# Patient Record
Sex: MALE | Race: WHITE | NOT HISPANIC OR LATINO | Employment: FULL TIME | URBAN - METROPOLITAN AREA
[De-identification: names, ages, dates, MRNs, and addresses within clinical notes are randomized per-mention and may not be internally consistent; named-entity substitution may affect disease eponyms.]

---

## 2020-02-03 ENCOUNTER — APPOINTMENT (OUTPATIENT)
Dept: GENERAL RADIOLOGY | Facility: CLINIC | Age: 55
End: 2020-02-03
Attending: EMERGENCY MEDICINE
Payer: COMMERCIAL

## 2020-02-03 ENCOUNTER — HOSPITAL ENCOUNTER (EMERGENCY)
Facility: CLINIC | Age: 55
Discharge: HOME | End: 2020-02-03
Attending: EMERGENCY MEDICINE | Admitting: EMERGENCY MEDICINE
Payer: COMMERCIAL

## 2020-02-03 VITALS
BODY MASS INDEX: 24.39 KG/M2 | RESPIRATION RATE: 14 BRPM | SYSTOLIC BLOOD PRESSURE: 120 MMHG | HEART RATE: 67 BPM | OXYGEN SATURATION: 94 % | DIASTOLIC BLOOD PRESSURE: 75 MMHG | WEIGHT: 170 LBS | TEMPERATURE: 97 F

## 2020-02-03 DIAGNOSIS — R07.9 CHEST PAIN, UNSPECIFIED TYPE: ICD-10-CM

## 2020-02-03 LAB
ANION GAP SERPL CALCULATED.3IONS-SCNC: 4 MMOL/L (ref 3–14)
BASOPHILS # BLD AUTO: 0 10E9/L (ref 0–0.2)
BASOPHILS NFR BLD AUTO: 0.5 %
BUN SERPL-MCNC: 13 MG/DL (ref 7–30)
CALCIUM SERPL-MCNC: 9.3 MG/DL (ref 8.5–10.1)
CHLORIDE SERPL-SCNC: 104 MMOL/L (ref 94–109)
CO2 SERPL-SCNC: 30 MMOL/L (ref 20–32)
CREAT SERPL-MCNC: 0.83 MG/DL (ref 0.66–1.25)
DIFFERENTIAL METHOD BLD: NORMAL
EOSINOPHIL # BLD AUTO: 0.1 10E9/L (ref 0–0.7)
EOSINOPHIL NFR BLD AUTO: 1.4 %
ERYTHROCYTE [DISTWIDTH] IN BLOOD BY AUTOMATED COUNT: 12.9 % (ref 10–15)
GFR SERPL CREATININE-BSD FRML MDRD: >90 ML/MIN/{1.73_M2}
GLUCOSE SERPL-MCNC: 83 MG/DL (ref 70–99)
HCT VFR BLD AUTO: 43.9 % (ref 40–53)
HGB BLD-MCNC: 14.6 G/DL (ref 13.3–17.7)
IMM GRANULOCYTES # BLD: 0 10E9/L (ref 0–0.4)
IMM GRANULOCYTES NFR BLD: 0.2 %
LYMPHOCYTES # BLD AUTO: 2.5 10E9/L (ref 0.8–5.3)
LYMPHOCYTES NFR BLD AUTO: 30 %
MCH RBC QN AUTO: 30.6 PG (ref 26.5–33)
MCHC RBC AUTO-ENTMCNC: 33.3 G/DL (ref 31.5–36.5)
MCV RBC AUTO: 92 FL (ref 78–100)
MONOCYTES # BLD AUTO: 0.5 10E9/L (ref 0–1.3)
MONOCYTES NFR BLD AUTO: 6.1 %
NEUTROPHILS # BLD AUTO: 5.2 10E9/L (ref 1.6–8.3)
NEUTROPHILS NFR BLD AUTO: 61.8 %
NRBC # BLD AUTO: 0 10*3/UL
NRBC BLD AUTO-RTO: 0 /100
PLATELET # BLD AUTO: 245 10E9/L (ref 150–450)
POTASSIUM SERPL-SCNC: 3.7 MMOL/L (ref 3.4–5.3)
RBC # BLD AUTO: 4.77 10E12/L (ref 4.4–5.9)
SODIUM SERPL-SCNC: 138 MMOL/L (ref 133–144)
TROPONIN I SERPL-MCNC: 0.01 UG/L (ref 0–0.04)
TROPONIN I SERPL-MCNC: <0.015 UG/L (ref 0–0.04)
WBC # BLD AUTO: 8.5 10E9/L (ref 4–11)

## 2020-02-03 PROCEDURE — 85025 COMPLETE CBC W/AUTO DIFF WBC: CPT | Performed by: EMERGENCY MEDICINE

## 2020-02-03 PROCEDURE — 25000132 ZZH RX MED GY IP 250 OP 250 PS 637: Performed by: EMERGENCY MEDICINE

## 2020-02-03 PROCEDURE — 99285 EMERGENCY DEPT VISIT HI MDM: CPT | Mod: 25

## 2020-02-03 PROCEDURE — 80048 BASIC METABOLIC PNL TOTAL CA: CPT | Performed by: EMERGENCY MEDICINE

## 2020-02-03 PROCEDURE — 71046 X-RAY EXAM CHEST 2 VIEWS: CPT

## 2020-02-03 PROCEDURE — 84484 ASSAY OF TROPONIN QUANT: CPT | Performed by: EMERGENCY MEDICINE

## 2020-02-03 PROCEDURE — 93005 ELECTROCARDIOGRAM TRACING: CPT

## 2020-02-03 RX ORDER — ACETAMINOPHEN 325 MG/1
975 TABLET ORAL ONCE
Status: COMPLETED | OUTPATIENT
Start: 2020-02-03 | End: 2020-02-03

## 2020-02-03 RX ADMIN — ACETAMINOPHEN 975 MG: 325 TABLET, FILM COATED ORAL at 21:57

## 2020-02-03 NOTE — ED AVS SNAPSHOT
[facility] Emergency Department  [address]  [facility] [address]  Phone:  [phone]  Fax:  [phone]                                    Pawel Vallejo   MRN: [id number]    Department:  [facility] Emergency Department   Date of Visit:  2/3/2020           After Visit Summary Signature Page    I have received my discharge instructions, and my questions have been answered. I have discussed any challenges I see with this plan with the nurse or doctor.    ..........................................................................................................................................  Patient/Patient Representative Signature      ..........................................................................................................................................  Patient Representative Print Name and Relationship to Patient    ..................................................               ................................................  Date                                   Time    ..........................................................................................................................................  Reviewed by Signature/Title    ...................................................              ..............................................  Date                                               Time          22EPIC Rev 08/18

## 2020-02-04 LAB — INTERPRETATION ECG - MUSE: NORMAL

## 2020-02-04 NOTE — ED TRIAGE NOTES
"Pt reports a week of constant chest pain. Pain is only relieved by Aspirin, states it comes back in a couple hours. Pt took 2 baby Aspirin today. Pt states it feels like a weight on his chest. Pt feels like he can take a full breath but gets short of breath with exertion. Pt denies arm pain, has some left jaw pain. Pt states \"I feel swimmy\", feels \"slow\". Pt has been able to work and drive all week.   "

## 2020-02-04 NOTE — ED PROVIDER NOTES
History     Chief Complaint:  Chest Pain      HPI   Pawel Vallejo is a 54 year old male who presents with chest pain. Patient states he flew back from [place] a week ago and has been feeling some chest heaviness since then. He notes the chest pain has been constant the whole week and today has been the most notable for his pain. He states the pain is there whether he is sitting or standing.  No positional changes.  He does note that he was carrying a lot of luggage at the airport and this may have been the onset of pain. He also notes shortness of breath when he moves, and he feels winded when he talks. Additionally, he notes a dull ache in his lower mandible. He notes he took 2 regular aspirin last night and 2 regular aspirin in the morning, and the pain went away after both instances. He also states he has been able to do his normal activities this past week. He notes he was a smoker for about 30 years, but he quit 9 years ago. After the death of his mother from a stroke a year and a half ago, he has began smoking sporadically. He also notes social alcohol. He denies experiencing arm pain, leg swelling, or fever. He denies history of hypertension, heart disease, or diabetes. He denies family history of heart disease. He denies street drug use.     Allergies:  The patient has no known drug allergies.    Medications:    Naproxen  Norco  Prevacid  Aspirin  Flomax    Past Medical History:    Chronic back pain  Pleurisy  Anxiety  GERD  Complete tear of left rotator cuff  IBS  Hyperlipidemia    Past Surgical History:    Cholecystectomy  Hernia repair  Forearm/wrist surgery, L/R    Family History:    Father: Cancer, Diabetes  Mother: Crohn's disease, stroke    Social History:  Rare tobacco use  Occasional alcohol use  Denies drug use  Marital Status:   [2]       Review of Systems   All other systems reviewed and are negative.      Physical Exam   First Vitals:  BP: (!) 141/103  Pulse: 65  Heart Rate:  60  Temp: 97  F (36.1  C)  Resp: 16  Weight: 77.1 kg (170 lb)  SpO2: 98 %      Physical Exam  General: Resting on the bed.  Head: No obvious trauma to head.  Ears, Nose, Throat:  External ears normal.  Nose normal.    Eyes:  Conjunctivae clear.  Pupils are equal, round, and reactive.   Neck: Normal range of motion.  Neck supple.   CV: Regular rate and rhythm.  No murmurs.    2+ radial pulses.  Mild chest wall tenderness to palpation   Respiratory: Effort normal and breath sounds normal.  No wheezing or crackles.   Gastrointestinal: Soft.  No distension. There is no tenderness.  There is no rigidity, no rebound and no guarding.   Musculoskeletal: Non tender non edematous calves  Neuro: Alert. Moving all extremities appropriately.  Normal speech.    Skin: Skin is warm and dry.  No rash noted.     Emergency Department Course   ECG:  Indication: Chest Pain  Time: 1932  Vent. Rate 57 bpm. MN interval 196. QRS duration 108. QT/QTc 430/418. P-R-T axis 72 55 63.  Sinus bradycardia Otherwise normal ECG. No significant change compared to EKG dated 9/6/4. Read time: 2148    Imaging:  XR Chest 2 views:   IMPRESSION: Normal heart size . No evidence for CHF or pneumonia. Scattered punctate calcified granulomas in the lungs. No pleural effusion or pneumothorax. as per radiology.    Laboratory:    2047 Troponin: <0.015    2259 Troponin: 0.015    CBC: WBC: 8.5, HGB: 14.6, PLT: 245    BMP: Glucose 83,  o/w WNL (Creatinine: 0.83)    Interventions:  2157 Tylenol 975 mg Oral      Emergency Department Course:  Nursing notes and vitals reviewed. (2135) I performed an exam of the patient as documented above.     IV inserted. Medicine administered as documented above. Blood drawn. This was sent to the lab for further testing, results above.     The patient was sent for a XR Chest 2 Views while in the emergency department, findings above.     2317 I rechecked the patient and discussed the results of his workup thus far.     Findings and plan  explained to the Patient. Patient discharged home with instructions regarding supportive care, medications, and reasons to return. The importance of close follow-up was reviewed.     I personally reviewed the laboratory results with the Patient and answered all related questions prior to discharge.     Impression & Plan      Medical Decision Makin-year-old male presents with chest pain.  Vital signs are reassuring.  Broad differential was pursued including but not limited to PE, dissection, pneumonia, pneumothorax, effusion, acute coronary syndrome, arrhythmia, lecture, metabolic, renal dysfunction, pericarditis, chest wall pain, etc.  Patient is well-appearing nontoxic.  CBC shows no leukocytosis or anemia.  BMP shows no acute electrolyte, metabolic, renal dysfunction.  Consider dissection but no tearing pain, normal mediastinum, no evidence of dissection.  Considered CHF but no effusions, no evidence of effusions, etc.  Chest x-ray shows no acute effusion, pneumonia, pneumothorax.  EKG shows sinus rhythm, no evidence acute ST-T wave change.  No evidence of acute ischemic changes.  No evidence of arrhythmia.  Troponin x2 is negative.  Patient's is heart score 3.  He appears to be low risk.  This seems to be rather atypical pain as it resolved with aspirin and tylenol.  He reports he was carrying some pretty heavy items prior to the onset of the pain while in [place].  This may be chest wall pain.  I considered PE but patient has no tachycardia or hypoxia.  This does not appear to be consistent with PE.  He did have recent travel but it was a very short flight to [place] which is less than 2 hours.  He has no swelling in the legs or pain in the legs.  Overall suspicion for PE is very low.  Patient is otherwise healthy.  With reassuring work-up and vital signs I think he is reasonably safe for discharge.  Advise close follow-up with primary care doctor.  Strict return precautions were discussed and patient was  encouraged to return to the ER if with any worsening or changing symptoms.    Diagnosis:    ICD-10-CM    1. Chest pain, unspecified type R07.9        Disposition:  discharged to home    Discharge Medications:  Discharge Medication List as of 2/3/2020 11:25 PM        Scribe Disclosure:  VON, Hua Rivas, am serving as a scribe on 2/3/2020 at 9:35 PM to personally document services performed by Merle Farrell MD based on my observations and the provider's statements to me.     Hua Rivas  2/3/2020   [facility] EMERGENCY DEPARTMENT       Merle Farrell MD  02/04/20 0200

## 2020-10-12 NOTE — TELEPHONE ENCOUNTER
DIAGNOSIS:MRI imaging @ [facility] / Cervical Spine/ [facility] Appt Ok'd by Jenny   APPOINTMENT DATE: 10.16.20   NOTES STATUS DETAILS   OFFICE NOTE from referring provider N/A    OFFICE NOTE from other specialist Care Everywhere 9.21.20 Martín Martinez   DISCHARGE SUMMARY from hospital N/A    DISCHARGE REPORT from the ER N/A    OPERATIVE REPORT N/A    MEDICATION LIST Internal    EMG (for Spine) N/A    IMPLANT RECORD/STICKER N/A    LABS     CBC/DIFF Internal 2.3.20   CULTURES N/A    INJECTIONS DONE IN RADIOLOGY N/A    MRI PACS 10.8.20 cervical spine, [facility]   CT SCAN N/A    XRAYS (IMAGES & REPORTS) N/A    TUMOR     PATHOLOGY  Slides & report N/A      Action 10.12.20 MJ   Action Taken Requested image from [facility]     Action 10.15.20 MJ   Action Taken Called [facility] and [facility] to push image.     Action 10.15.20 MJ 4:28 PM   Action Taken Called [facility]- they will send image  UPDATE pulled image

## 2020-10-15 DIAGNOSIS — M54.2 CERVICAL PAIN: Primary | ICD-10-CM

## 2020-10-16 ENCOUNTER — PRE VISIT (OUTPATIENT)
Dept: ORTHOPEDICS | Facility: CLINIC | Age: 55
End: 2020-10-16

## 2020-10-16 ENCOUNTER — OFFICE VISIT (OUTPATIENT)
Dept: ORTHOPEDICS | Facility: CLINIC | Age: 55
End: 2020-10-16
Payer: COMMERCIAL

## 2020-10-16 ENCOUNTER — ANCILLARY PROCEDURE (OUTPATIENT)
Dept: GENERAL RADIOLOGY | Facility: CLINIC | Age: 55
End: 2020-10-16
Attending: ORTHOPAEDIC SURGERY
Payer: COMMERCIAL

## 2020-10-16 DIAGNOSIS — M54.50 LUMBAR PAIN: ICD-10-CM

## 2020-10-16 DIAGNOSIS — M54.2 CERVICAL PAIN: Primary | ICD-10-CM

## 2020-10-16 PROCEDURE — 72050 X-RAY EXAM NECK SPINE 4/5VWS: CPT | Performed by: RADIOLOGY

## 2020-10-16 PROCEDURE — 99204 OFFICE O/P NEW MOD 45 MIN: CPT | Performed by: ORTHOPAEDIC SURGERY

## 2020-10-16 RX ORDER — LOSARTAN POTASSIUM 25 MG/1
25 TABLET ORAL
COMMUNITY
Start: 2020-04-30 | End: 2020-12-18

## 2020-10-16 RX ORDER — GABAPENTIN 300 MG/1
300 CAPSULE ORAL 3 TIMES DAILY
Qty: 90 CAPSULE | Refills: 0 | Status: SHIPPED | OUTPATIENT
Start: 2020-10-16 | End: 2020-12-18

## 2020-10-16 RX ORDER — TAMSULOSIN HYDROCHLORIDE 0.4 MG/1
0.4 CAPSULE ORAL DAILY
Status: ON HOLD | COMMUNITY
Start: 2020-04-27 | End: 2020-12-31

## 2020-10-16 RX ORDER — IBUPROFEN 800 MG/1
800 TABLET, FILM COATED ORAL
COMMUNITY
Start: 2018-12-20 | End: 2020-12-18

## 2020-10-16 NOTE — NURSING NOTE
Reason For Visit:   Chief Complaint   Patient presents with     Consult     cervical pain left side radiating down left shoulder and arm effecting thumb and index finger. patient experiencing vertigo        Primary MD: Melvi Hale  Ref. MD: Geoffrey     ?  No    Date of injury: about 3 months ago   Type of injury: chronic .  Date of surgery: none   Type of surgery: none .  Smoker: No  Request smoking cessation information: No    There were no vitals taken for this visit.         Oswestry (CAROLINA) Questionnaire    No flowsheet data found.         Neck Disability Index (NDI) Questionnaire    No flowsheet data found.                Promis 10 Assessment    No flowsheet data found.             Carlos Yoo, ATC

## 2020-10-16 NOTE — PROGRESS NOTES
Spine Surgery Return Clinic Visit      Chief Complaint:   Consult (cervical pain left side radiating down left shoulder and arm effecting thumb and index finger. patient experiencing vertigo )      Interval HPI:  Symptom Profile Including: location of symptoms, onset, severity, exacerbating/alleviating factors, previous treatments:        Pawel Vallejo is a 55 year old male who presents today for evaluation of a left C5 and C6 distribution radiculopathy, which has been happening on and off for 9 months, but really quite severe over the last 3 weeks.  It consists of a burning and tingling pain shooting down into the shoulder and left thumb.  He has to tilt his head to the right side to get it to go away.  It is happening really all the time.  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  He tried a Medrol Dosepak and he is also been taking anti-inflammatories at home without much relief for it.            Past Medical History:     Past Medical History:   Diagnosis Date     Chronic back pain      Pleurisy             Past Surgical History:     Past Surgical History:   Procedure Laterality Date     CHOLECYSTECTOMY       HERNIA REPAIR              Social History:     Social History     Tobacco Use     Smoking status: Former Smoker     Smokeless tobacco: Never Used   Substance Use Topics     Alcohol use: Yes            Family History:   History reviewed. No pertinent family history.         Allergies:   No Known Allergies         Medications:     Current Outpatient Medications   Medication     ibuprofen (ADVIL/MOTRIN) 800 MG tablet     losartan (COZAAR) 25 MG tablet     tamsulosin (FLOMAX) 0.4 MG capsule     ASPIRIN PO     HYDROcodone-acetaminophen (NORCO) 5-325 MG per tablet     LANsoprazole (PREVACID) 30 MG capsule     No current facility-administered medications for this visit.              Review of Systems:   A focused  musculoskeletal and neurologic ROS was performed with pertinent positives and negatives noted in the HPI.  Additional systems were also reviewed and are documented at the bottom of the note.         Physical Exam:   Vitals: There were no vitals taken for this visit.  Musculoskeletal, Neurologic, and Spine:     Cervical spine:    Appearance -no gross step-offs, kyphosis.    Motor -     C5: Deltoids R 5/5 and L 5/5 strength    C6: Biceps R 5/5 and L 4/5 strength     C7: Triceps R 5/5 and L 5/5 strength     C8:  R 5/5 and L 4/5 strength     T1: Dorsal interossei R 4/5 and L 4/5 strength        Sensation: intact to light touch in C5-T1, diminished left C5 and C6      Special Tests -      Lhermitte's Test - Negative     Spurling's Test - positive to the left     Da Silva's Test - Negative          Neurologic:      REFLEXES Left Right   Biceps 1+ 1+   Triceps 1+ 1+   Brachioradialis 1+ 1+                             Alignment:  Patient stands with a neutral standing sagittal balance.           Imaging:   We ordered and independently reviewed new radiographs at this clinic visit. The results were discussed with the patient. Findings include:    Cervical radiographs today show mild diffuse spondylosis with trace kyphotic alignment.    Cervical MRI shows mild diffuse degenerative changes worse C4-5 and C5-6.  At C5-6 there is left severe foraminal stenosis.  At C4-5 there is mild to moderate central stenosis and bilateral foraminal stenosis.       Assessment and Plan:     55 year old male with left C5 and C6 distribution radiculopathy in the setting of foraminal stenosis and spondylosis on his MRI.    We discussed options today.  At this point he has not yet attempted appropriate nonoperative management and I recommended a trial of physical therapy, gabapentin and a left C6 selective nerve root injection.  I am hopeful this could give him some acute relief.  If this did not provide benefit for him, he might be a candidate  for either a cervical disc replacement or a cervical fusion.  Help us assess which would be the best surgery for him I recommended obtaining a cervical CT scan to better assess his spondylosis.  I will follow-up with him in 3 weeks to assess his response to the nonoperative management and decide at that time whether or not surgery would be indicated.    In terms of his low back, which he brought up at the end of the visit, he does have a history of injections in the past and some radicular complaints.  He is not had any imaging of this.  I recommended a lumbar MRI and I said I would also review this with him at the next visit.     Respectfully,  Pawel Irizarry MD  Spine Surgery  [facility]

## 2020-10-16 NOTE — LETTER
10/16/2020         RE: Pawel Vallejo  [address] [facility] [address]        Dear Colleague,    Thank you for referring your patient, Pawel Vallejo, to the [facility]. Please see a copy of my visit note below.    Spine Surgery Return Clinic Visit      Chief Complaint:   Consult (cervical pain left side radiating down left shoulder and arm effecting thumb and index finger. patient experiencing vertigo )      Interval HPI:  Symptom Profile Including: location of symptoms, onset, severity, exacerbating/alleviating factors, previous treatments:        Pawel Vallejo is a 55 year old male who presents today for evaluation of a left C5 and C6 distribution radiculopathy, which has been happening on and off for 9 months, but really quite severe over the last 3 weeks.  It consists of a burning and tingling pain shooting down into the shoulder and left thumb.  He has to tilt his head to the right side to get it to go away.  It is happening really all the time.  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  He tried a Medrol Dosepak and he is also been taking anti-inflammatories at home without much relief for it.            Past Medical History:     Past Medical History:   Diagnosis Date     Chronic back pain      Pleurisy             Past Surgical History:     Past Surgical History:   Procedure Laterality Date     CHOLECYSTECTOMY       HERNIA REPAIR              Social History:     Social History     Tobacco Use     Smoking status: Former Smoker     Smokeless tobacco: Never Used   Substance Use Topics     Alcohol use: Yes            Family History:   History reviewed. No pertinent family history.         Allergies:   No Known Allergies         Medications:     Current Outpatient Medications   Medication     ibuprofen (ADVIL/MOTRIN) 800 MG tablet     losartan (COZAAR) 25  MG tablet     tamsulosin (FLOMAX) 0.4 MG capsule     ASPIRIN PO     HYDROcodone-acetaminophen (NORCO) 5-325 MG per tablet     LANsoprazole (PREVACID) 30 MG capsule     No current facility-administered medications for this visit.              Review of Systems:   A focused musculoskeletal and neurologic ROS was performed with pertinent positives and negatives noted in the HPI.  Additional systems were also reviewed and are documented at the bottom of the note.         Physical Exam:   Vitals: There were no vitals taken for this visit.  Musculoskeletal, Neurologic, and Spine:     Cervical spine:    Appearance -no gross step-offs, kyphosis.    Motor -     C5: Deltoids R 5/5 and L 5/5 strength    C6: Biceps R 5/5 and L 4/5 strength     C7: Triceps R 5/5 and L 5/5 strength     C8:  R 5/5 and L 4/5 strength     T1: Dorsal interossei R 4/5 and L 4/5 strength        Sensation: intact to light touch in C5-T1, diminished left C5 and C6      Special Tests -      Lhermitte's Test - Negative     Spurling's Test - positive to the left     Da Silva's Test - Negative          Neurologic:      REFLEXES Left Right   Biceps 1+ 1+   Triceps 1+ 1+   Brachioradialis 1+ 1+                             Alignment:  Patient stands with a neutral standing sagittal balance.           Imaging:   We ordered and independently reviewed new radiographs at this clinic visit. The results were discussed with the patient. Findings include:    Cervical radiographs today show mild diffuse spondylosis with trace kyphotic alignment.    Cervical MRI shows mild diffuse degenerative changes worse C4-5 and C5-6.  At C5-6 there is left severe foraminal stenosis.  At C4-5 there is mild to moderate central stenosis and bilateral foraminal stenosis.       Assessment and Plan:     55 year old male with left C5 and C6 distribution radiculopathy in the setting of foraminal stenosis and spondylosis on his MRI.    We discussed options today.  At this point he has not  yet attempted appropriate nonoperative management and I recommended a trial of physical therapy, gabapentin and a left C6 selective nerve root injection.  I am hopeful this could give him some acute relief.  If this did not provide benefit for him, he might be a candidate for either a cervical disc replacement or a cervical fusion.  Help us assess which would be the best surgery for him I recommended obtaining a cervical CT scan to better assess his spondylosis.  I will follow-up with him in 3 weeks to assess his response to the nonoperative management and decide at that time whether or not surgery would be indicated.    In terms of his low back, which he brought up at the end of the visit, he does have a history of injections in the past and some radicular complaints.  He is not had any imaging of this.  I recommended a lumbar MRI and I said I would also review this with him at the next visit.     Respectfully,  Pawel Irizarry MD  Spine Surgery  [facility]

## 2020-10-22 ENCOUNTER — TELEPHONE (OUTPATIENT)
Dept: ORTHOPEDICS | Facility: CLINIC | Age: 55
End: 2020-10-22

## 2020-10-22 NOTE — TELEPHONE ENCOUNTER
RN called and spoke with patient. Explained to patient RN has not received a fax from [facility]. Also explained to patient it has been a struggle to get [facility] to do any imaging because they will not call patient to do imaging until a fax is filled with exactly the same info in the same place. Patient is frustrated but understood the process. Patient stated he is willing to wait for a few days to arrive. If it's more than a week, then he will get the imaging done with the [facility] system. RN will keep an eye for the fax and will be in touch with patient.    Star Lewis RN        [facility]    Phone Message    May a detailed message be left on voicemail: yes     Reason for Call: Other: Pt of Dr. Irizarry calling in stating that [facility] did receive the orders for MRI and CT.  Pt stated that [facility] faxed Dr. Irizarry's care team a form to fill out and fax back and they have not received it as of yet.  Pt cannot schedule his imaging until this gets done.  Pt is requesting a call about this today, he is really trying to get this scheduled      Action Taken: Message routed to:  [facility] Ortho    Travel Screening: Not Applicable

## 2020-10-27 ENCOUNTER — TELEPHONE (OUTPATIENT)
Dept: ORTHOPEDICS | Facility: CLINIC | Age: 55
End: 2020-10-27

## 2020-10-27 NOTE — TELEPHONE ENCOUNTER
RN called and spoke with patient. Told patient we have not received the fax from [facility] so patient agreed to getting the imaging with [facility]. RN gave number to call for imaging scheduling. Patient will notify RN once the imaging is obtained and RN will place him on Dr. Irizarry 's schedule virtual.    Star Lewis RN

## 2020-10-29 NOTE — TELEPHONE ENCOUNTER
[facility] Call Center    Phone Message    May a detailed message be left on voicemail: no     Reason for Call: Other: Pt is calling back about his imaging orders from Dr. Irizarry, Pt is asking that the orders be faxed to the [facility] in [place] at fax number  [phone].      Pt is requesting a call when this has been done so he can get these scans scheduled.    Action Taken: Message routed to:  [facility] Ortho    Travel Screening: Not Applicable

## 2020-10-30 ENCOUNTER — TRANSFERRED RECORDS (OUTPATIENT)
Dept: HEALTH INFORMATION MANAGEMENT | Facility: CLINIC | Age: 55
End: 2020-10-30

## 2020-11-03 ENCOUNTER — TELEPHONE (OUTPATIENT)
Dept: ORTHOPEDICS | Facility: CLINIC | Age: 55
End: 2020-11-03

## 2020-11-03 NOTE — TELEPHONE ENCOUNTER
RN called and left patient a detailed VM. If patient calls back, please offer patient a virtual visit with Dr. Irizarry to review CT and MR.  Thank you    Star Lewis RN        ----- Message from Pawel Irizarry MD sent at 11/3/2020  4:49 PM CST -----  Set up virtual visit please, thank you  ----- Message -----  From: Star Lweis RN  Sent: 11/3/2020   4:36 PM CST  To: MD Dr. Juan C Yeung.  I read you notes about this patient. Just want to let you know that his CT and MRI is in PACs.  Please reach out to patient after you have reviewed them.    Thank you    [email or web address]

## 2020-11-06 ENCOUNTER — VIRTUAL VISIT (OUTPATIENT)
Dept: ORTHOPEDICS | Facility: CLINIC | Age: 55
End: 2020-11-06
Payer: COMMERCIAL

## 2020-11-06 DIAGNOSIS — M54.2 CERVICAL PAIN: Primary | ICD-10-CM

## 2020-11-06 DIAGNOSIS — M54.12 CERVICAL RADICULOPATHY: ICD-10-CM

## 2020-11-06 PROCEDURE — 99214 OFFICE O/P EST MOD 30 MIN: CPT | Mod: GT | Performed by: ORTHOPAEDIC SURGERY

## 2020-11-06 NOTE — NURSING NOTE
Reason For Visit:   Chief Complaint   Patient presents with     RECHECK     follow up to discuss recent imaging        There were no vitals taken for this visit.         Carlos Yoo ATC

## 2020-11-06 NOTE — LETTER
"    11/6/2020         RE: Pawel Vallejo  [address] [id number]        Dear Colleague,    Thank you for referring your patient, Pawel Vallejo, to the [facility] [place]. Please see a copy of my visit note below.    Pawel Vallejo is a 55 year old male who is being evaluated via a billable video visit.      The patient has been notified of following:     \"This video visit will be conducted via a call between you and your physician/provider. We have found that certain health care needs can be provided without the need for an in-person physical exam.  This service lets us provide the care you need with a video conversation.  If a prescription is necessary we can send it directly to your pharmacy.  If lab work is needed we can place an order for that and you can then stop by our lab to have the test done at a later time.    Video visits are billed at different rates depending on your insurance coverage.  Please reach out to your insurance provider with any questions.    If during the course of the call the physician/provider feels a video visit is not appropriate, you will not be charged for this service.\"    Patient has given verbal consent for Video visit? Yes  How would you like to obtain your AVS? MyChart  If you are dropped from the video visit, the video invite should be resent to: Send to e-mail at: [email or web address]  Will anyone else be joining your video visit? No      I spoke with Gustavo via video today for 53 minutes.    He had a number of intelligent questions about options.  He recently completed a cervical CT scan which show spondylosis and foraminal stenosis C4-6.  This also fits with his cervical MRI showing C4-6 left greater than right foraminal stenosis and spondylosis.    He continues to have severe left-sided arm radicular complaints.  He cannot bend his head to the left side.  He during the call today he repeatedly bends his neck " to the right to try and relieve the symptoms.  He again notes that it is significantly limiting his work.  He has been on the gabapentin without much benefit.  He also had a left C6 selective nerve root block in October, which provided some short-term relief for a few hours but nothing long lasting.    We discussed options at this point and this is where the bulk of our discussion was spent.  With his mild spondylosis and slight kyphosis, I told him I would lean a little more towards a cervical fusion operation.  The alternative would be a disc replacement.  I explained the benefit of a disc replacement would be an easier perioperative recovery with no need for bone graft harvest and perhaps lesser risk of adjacent segment disease in the long-term.  The benefit of a fusion is that it may allow us to better address the spondylosis and slight kyphosis that is present on x-rays.  I explained that his spondylosis is somewhat mild to moderate and although it is not the most clear-cut indication there are certainly surgeons that are doing disc replacement in patients with this amount of arthritis.  So I told him is a little bit of a gray area and I asked his preferences and we talked through things and I told him in a gray area like this I would very much respect the patient's preference.  He had a number of intelligent questions about these options and ultimately together we discarded that a fusion based option would be the best approach.  Given his history of nicotine use we agreed to use iliac crest autograft.  I told him he would need to be nicotine free for surgery.    He will meet need to make sure he completes cervical PT to meet insurance requirements and he will also need a negative urine nicotine screen.  He will get these things done and then follow-up with me in early December.  I told him if these things are completed we could potentially shoot for late December or early January surgery.    I also explained  the need for cervical collar wear and the risks and benefits of the operation as noted below.  I told him he cannot drive for 6 weeks while wearing the collar.    Risks of this surgery include risk of infection, risk of dural tear resulting in CSF leak which might result in headaches, or possible need for lumbar drain, or possible revision surgery in the setting of a persistent leak. Possible nerve root injury resulting in numbness weakness or paralysis into the arms or legs. Possible radiculitis which could result in similar symptoms or could result in significant neurogenic type pain. Risk of incomplete decompression which might require revision surgery in the future.  Risk of adjacent segment problems requiring surgery in the future. Risk of incomplete relief of symptoms possibly requiring revision surgery in the future. Furthermore, although rare, there are risks of major vessel injury such as to the vertebral artery or major organ injury such as to the esophagus or trachea from the surgery.  There are risks of dysphagia or dysphonia which can make it hard to swallow or talk. I explained that in fact most patients will have some period of swallowing difficulty following the surgery.  In some cases these things occur as a result of laryngeal nerve injury, and we discussed this possibility as well. There is a risk of hematoma formation requiring urgent return to the OR for airway compromise.  There is a risk of blood clots in the legs or the lungs.  Lastly, although rare, there are certainly risks of the anesthetic including stroke heart attack and death.    The author of this note documented a reason for not sharing it with the patient.  Video-Visit Details    Type of service:  Video Visit    53 minutes video time    Originating Location (pt. Location): Home    Distant Location (provider location):  [facility]     Platform used for Video Visit: Catalina Irizarry  MD        Per Dr. Irizarry's instructions. RN placed a nicotinine/cotinine test. Also placed another PT order.  RN called patient and told patient the importance to get these done.  Also gave patient Keyanna's number in case he wants to know his surgery status.  Patient expressed understanding and will start working on quitting smoking.    Star Lewis RN    The author of this note documented a reason for not sharing it with the patient.          Pawel Irizarry MD

## 2020-11-06 NOTE — PROGRESS NOTES
Per Dr. Irizarry's instructions. RN placed a nicotinine/cotinine test. Also placed another PT order.  RN called patient and told patient the importance to get these done.  Also gave patient Keyanna's number in case he wants to know his surgery status.  Patient expressed understanding and will start working on quitting smoking.    Star Lewis RN    The author of this note documented a reason for not sharing it with the patient.

## 2020-11-06 NOTE — PROGRESS NOTES
"Pawel Vallejo is a 55 year old male who is being evaluated via a billable video visit.      The patient has been notified of following:     \"This video visit will be conducted via a call between you and your physician/provider. We have found that certain health care needs can be provided without the need for an in-person physical exam.  This service lets us provide the care you need with a video conversation.  If a prescription is necessary we can send it directly to your pharmacy.  If lab work is needed we can place an order for that and you can then stop by our lab to have the test done at a later time.    Video visits are billed at different rates depending on your insurance coverage.  Please reach out to your insurance provider with any questions.    If during the course of the call the physician/provider feels a video visit is not appropriate, you will not be charged for this service.\"    Patient has given verbal consent for Video visit? Yes  How would you like to obtain your AVS? MyChart  If you are dropped from the video visit, the video invite should be resent to: Send to e-mail at: [email or web address]  Will anyone else be joining your video visit? No      I spoke with Gustavo via video today for 53 minutes.    He had a number of intelligent questions about options.  He recently completed a cervical CT scan which show spondylosis and foraminal stenosis C4-6.  This also fits with his cervical MRI showing C4-6 left greater than right foraminal stenosis and spondylosis.    He continues to have severe left-sided arm radicular complaints.  He cannot bend his head to the left side.  He during the call today he repeatedly bends his neck to the right to try and relieve the symptoms.  He again notes that it is significantly limiting his work.  He has been on the gabapentin without much benefit.  He also had a left C6 selective nerve root block in October, which provided some short-term relief for a few hours " but nothing long lasting.    We discussed options at this point and this is where the bulk of our discussion was spent.  With his mild spondylosis and slight kyphosis, I told him I would lean a little more towards a cervical fusion operation.  The alternative would be a disc replacement.  I explained the benefit of a disc replacement would be an easier perioperative recovery with no need for bone graft harvest and perhaps lesser risk of adjacent segment disease in the long-term.  The benefit of a fusion is that it may allow us to better address the spondylosis and slight kyphosis that is present on x-rays.  I explained that his spondylosis is somewhat mild to moderate and although it is not the most clear-cut indication there are certainly surgeons that are doing disc replacement in patients with this amount of arthritis.  So I told him is a little bit of a gray area and I asked his preferences and we talked through things and I told him in a gray area like this I would very much respect the patient's preference.  He had a number of intelligent questions about these options and ultimately together we discarded that a fusion based option would be the best approach.  Given his history of nicotine use we agreed to use iliac crest autograft.  I told him he would need to be nicotine free for surgery.    He will meet need to make sure he completes cervical PT to meet insurance requirements and he will also need a negative urine nicotine screen.  He will get these things done and then follow-up with me in early December.  I told him if these things are completed we could potentially shoot for late December or early January surgery.    I also explained the need for cervical collar wear and the risks and benefits of the operation as noted below.  I told him he cannot drive for 6 weeks while wearing the collar.    Risks of this surgery include risk of infection, risk of dural tear resulting in CSF leak which might result in  headaches, or possible need for lumbar drain, or possible revision surgery in the setting of a persistent leak. Possible nerve root injury resulting in numbness weakness or paralysis into the arms or legs. Possible radiculitis which could result in similar symptoms or could result in significant neurogenic type pain. Risk of incomplete decompression which might require revision surgery in the future.  Risk of adjacent segment problems requiring surgery in the future. Risk of incomplete relief of symptoms possibly requiring revision surgery in the future. Furthermore, although rare, there are risks of major vessel injury such as to the vertebral artery or major organ injury such as to the esophagus or trachea from the surgery.  There are risks of dysphagia or dysphonia which can make it hard to swallow or talk. I explained that in fact most patients will have some period of swallowing difficulty following the surgery.  In some cases these things occur as a result of laryngeal nerve injury, and we discussed this possibility as well. There is a risk of hematoma formation requiring urgent return to the OR for airway compromise.  There is a risk of blood clots in the legs or the lungs.  Lastly, although rare, there are certainly risks of the anesthetic including stroke heart attack and death.    The author of this note documented a reason for not sharing it with the patient.  Video-Visit Details    Type of service:  Video Visit    53 minutes video time    Originating Location (pt. Location): Home    Distant Location (provider location):  [facility]     Platform used for Video Visit: Catalina Irizarry MD

## 2020-11-09 ENCOUNTER — THERAPY VISIT (OUTPATIENT)
Dept: PHYSICAL THERAPY | Facility: CLINIC | Age: 55
End: 2020-11-09
Attending: ORTHOPAEDIC SURGERY
Payer: COMMERCIAL

## 2020-11-09 DIAGNOSIS — M54.12 CERVICAL RADICULOPATHY: ICD-10-CM

## 2020-11-09 DIAGNOSIS — M54.2 CERVICAL PAIN: ICD-10-CM

## 2020-11-09 PROCEDURE — 97140 MANUAL THERAPY 1/> REGIONS: CPT | Mod: GP | Performed by: PHYSICAL THERAPIST

## 2020-11-09 PROCEDURE — 97110 THERAPEUTIC EXERCISES: CPT | Mod: GP | Performed by: PHYSICAL THERAPIST

## 2020-11-09 PROCEDURE — 97161 PT EVAL LOW COMPLEX 20 MIN: CPT | Mod: GP | Performed by: PHYSICAL THERAPIST

## 2020-11-09 NOTE — PROGRESS NOTES
"[facility] Initial Evaluation  Subjective:  The history is provided by the patient. No  was used.   Therapist Generated HPI Evaluation  Problem details: Patient reports a gradual onset of neck pain and \"buzzing\" in left UE beginning about 4 months ago.  Patient c/o difficulty sleeping, looking up upwards, and turning head towards left.  Physical therapy was ordered 10/16/2020.  Patient is tentatively schedule to have cervical fusion surgery if therapy is not helpful.         Type of problem:  Cervical spine.    This is a new condition.    Where condition occurred: for unknown reasons.  Patient reports pain:  Lower cervical spine.  Pain is described as aching and sharp and is constant.  Pain radiates to:  Hand left. Pain is the same all the time.  Since onset symptoms are unchanged.  Associated symptoms:  Loss of motion/stiffness, numbness and tingling. Symptoms are exacerbated by driving, rotating head and looking up or down  Relieved by: tilting head toward right.  Special tests included:  CT scan and x-ray.  Past treatment: ADRIANO 1-2 weeks ago. Improved with treatment: helped for 3 hours.  Restrictions due to condition include:  Working in normal job without restrictions.  Barriers include:  None as reported by patient.    Patient Health History  Pawel Vallejo being seen for PT on Neck.     Date of Onset: 4 months ago.   Problem occurred: ?? old age   Pain is reported as 4/10 on pain scale.  General health as reported by patient is good.  Pertinent medical history includes: numbness/tingling.   Red flags:  None as reported by patient.  Medical allergies: none.   Surgeries include:  Orthopedic surgery.    Current medications:  High blood pressure medication.    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   Primary job tasks include:  Computer work and driving.                                    Objective:  Standing Alignment:    Cervical/Thoracic:  Forward " head                                    Cervical/Thoracic Evaluation    AROM:  AROM Cervical:    Flexion:            WNL.  Protraction WNL  Extension:       Min Loss (++) L UE Symptoms.  Retraction seated  Min/Mod Loss (++) L UE Symptoms.  Retraction in supine and slight flexion Min Loss (+/-).   Rotation:         Left: Min Loss     Right: WNL  Side Bend:      Left: Min Loss     Right:  Min Loss    Strength: Fair deep neck flexor strength    Cervical Myotomes:          C5 (Deltoid):  Left: 5    Right: 5  C6 (Biceps):  Left: 4+    Right: 5  C7 (Triceps):  Left: 5    Right: 5  C8 (Thumb Ext): Left: 5    Right: 5                Spinal Segmental Conclusions:  Decreased multi-segmental mobility lower cervical and upper thoracic                                                  General     ROS    Assessment/Plan:    Patient is a 55 year old male with cervical complaints.    Patient has the following significant findings with corresponding treatment plan.                Diagnosis 1:  Cervical Derangement  Pain -  manual therapy, self management, education and home program  Decreased ROM/flexibility - manual therapy, therapeutic exercise, therapeutic activity and home program  Decreased joint mobility - manual therapy, therapeutic exercise, therapeutic activity and home program  Decreased strength - therapeutic exercise, therapeutic activities and home program  Impaired muscle performance - neuro re-education and home program  Decreased function - therapeutic activities and home program  Impaired posture - neuro re-education, therapeutic activities and home program    Therapy Evaluation Codes:   1) History comprised of:   Personal factors that impact the plan of care:      None.    Comorbidity factors that impact the plan of care are:      None.     Medications impacting care: None.  2) Examination of Body Systems comprised of:   Body structures and functions that impact the plan of care:      Cervical spine.   Activity  limitations that impact the plan of care are:      Driving, Sleeping and Looking upward, Looking left.  3) Clinical presentation characteristics are:   Stable/Uncomplicated.  4) Decision-Making    Low complexity using standardized patient assessment instrument and/or measureable assessment of functional outcome.  Cumulative Therapy Evaluation is: Low complexity.    Previous and current functional limitations:  (See Goal Flow Sheet for this information)    Short term and Long term goals: (See Goal Flow Sheet for this information)     Communication ability:  Patient appears to be able to clearly communicate and understand verbal and written communication and follow directions correctly.  Treatment Explanation - The following has been discussed with the patient:   RX ordered/plan of care  Anticipated outcomes  Possible risks and side effects  This patient would benefit from PT intervention to resume normal activities.   Rehab potential is good.    Frequency:  1 X week, once daily  Duration:  for 8 weeks  Discharge Plan:  Achieve all LTG.  Independent in home treatment program.  Reach maximal therapeutic benefit.    Please refer to the daily flowsheet for treatment today, total treatment time and time spent performing 1:1 timed codes.

## 2020-11-12 ENCOUNTER — TELEPHONE (OUTPATIENT)
Dept: ORTHOPEDICS | Facility: CLINIC | Age: 55
End: 2020-11-12

## 2020-11-12 NOTE — TELEPHONE ENCOUNTER
Phoned patient to discuss scheduling surgery with Dr Irizarry. I left him my direct number to call back at his convenience. [phone]

## 2020-11-16 ENCOUNTER — THERAPY VISIT (OUTPATIENT)
Dept: PHYSICAL THERAPY | Facility: CLINIC | Age: 55
End: 2020-11-16
Payer: COMMERCIAL

## 2020-11-16 DIAGNOSIS — M54.12 CERVICAL RADICULOPATHY: ICD-10-CM

## 2020-11-16 PROCEDURE — 97530 THERAPEUTIC ACTIVITIES: CPT | Mod: GP | Performed by: PHYSICAL THERAPY ASSISTANT

## 2020-11-16 PROCEDURE — 97110 THERAPEUTIC EXERCISES: CPT | Mod: GP | Performed by: PHYSICAL THERAPY ASSISTANT

## 2020-11-20 NOTE — TELEPHONE ENCOUNTER
Patient is scheduled for surgery with Dr. Irizarry    Spoke or left message with: Patient    Date of Surgery: 12/31/20    Location: [place]    Post op: 6 weeks    Pre-op with surgeon (if applicable): Complete    H&P: Scheduled with PAC 12/11/20    Additional imaging/appointments: N/A    Surgery packet: Mailed to patient today    Additional comments: patient aware surgery may need to be rescheduled due to the pandemic

## 2020-11-23 ENCOUNTER — THERAPY VISIT (OUTPATIENT)
Dept: PHYSICAL THERAPY | Facility: CLINIC | Age: 55
End: 2020-11-23
Payer: COMMERCIAL

## 2020-11-23 DIAGNOSIS — M54.12 CERVICAL RADICULOPATHY: ICD-10-CM

## 2020-11-23 PROCEDURE — 97110 THERAPEUTIC EXERCISES: CPT | Mod: GT | Performed by: PHYSICAL THERAPY ASSISTANT

## 2020-11-23 NOTE — TELEPHONE ENCOUNTER
FUTURE VISIT INFORMATION      SURGERY INFORMATION:    Date: 20    Location: [facility]    Surgeon:  Pawel Irizarry MD    Anesthesia Type:  general    Procedure: Cervical 4 to 6 anterior cervical decompression and fusion with medtronic plate and screws, interbody device, use of fluoroscopy, microscope and left sided iliac crest autograft    Consult: virtual visit     RECORDS REQUESTED FROM:       Primary Care Provider: Melvi Hale MD - Martín    Most recent EKG+ Tracin/3/20    Most recent Cardiac Stress Test: 14

## 2020-11-27 ENCOUNTER — TRANSFERRED RECORDS (OUTPATIENT)
Dept: HEALTH INFORMATION MANAGEMENT | Facility: CLINIC | Age: 55
End: 2020-11-27

## 2020-11-29 DIAGNOSIS — Z11.59 ENCOUNTER FOR SCREENING FOR OTHER VIRAL DISEASES: Primary | ICD-10-CM

## 2020-11-30 ENCOUNTER — TRANSFERRED RECORDS (OUTPATIENT)
Dept: HEALTH INFORMATION MANAGEMENT | Facility: CLINIC | Age: 55
End: 2020-11-30

## 2020-12-02 ENCOUNTER — THERAPY VISIT (OUTPATIENT)
Dept: PHYSICAL THERAPY | Facility: CLINIC | Age: 55
End: 2020-12-02
Payer: COMMERCIAL

## 2020-12-02 DIAGNOSIS — M54.12 CERVICAL RADICULOPATHY: ICD-10-CM

## 2020-12-02 PROCEDURE — 97012 MECHANICAL TRACTION THERAPY: CPT | Mod: GP | Performed by: PHYSICAL THERAPIST

## 2020-12-02 PROCEDURE — 97530 THERAPEUTIC ACTIVITIES: CPT | Mod: GP | Performed by: PHYSICAL THERAPIST

## 2020-12-04 ENCOUNTER — VIRTUAL VISIT (OUTPATIENT)
Dept: ORTHOPEDICS | Facility: CLINIC | Age: 55
End: 2020-12-04
Payer: COMMERCIAL

## 2020-12-04 ENCOUNTER — TELEPHONE (OUTPATIENT)
Dept: ORTHOPEDICS | Facility: CLINIC | Age: 55
End: 2020-12-04

## 2020-12-04 DIAGNOSIS — M54.50 LUMBAR PAIN: Primary | ICD-10-CM

## 2020-12-04 PROCEDURE — 99213 OFFICE O/P EST LOW 20 MIN: CPT | Mod: TEL | Performed by: ORTHOPAEDIC SURGERY

## 2020-12-04 RX ORDER — TRIAMCINOLONE ACETONIDE 1 MG/G
CREAM TOPICAL
Status: ON HOLD | COMMUNITY
Start: 2020-11-30 | End: 2020-12-31

## 2020-12-04 NOTE — PROGRESS NOTES
"Pawel Vallejo is a 55 year old male who is being evaluated via a billable telephone visit.      The patient has been notified of following:     \"This telephone visit will be conducted via a call between you and your physician/provider. We have found that certain health care needs can be provided without the need for a physical exam.  This service lets us provide the care you need with a short phone conversation.  If a prescription is necessary we can send it directly to your pharmacy.  If lab work is needed we can place an order for that and you can then stop by our lab to have the test done at a later time.    Telephone visits are billed at different rates depending on your insurance coverage. During this emergency period, for some insurers they may be billed the same as an in-person visit.  Please reach out to your insurance provider with any questions.    If during the course of the call the physician/provider feels a telephone visit is not appropriate, you will not be charged for this service.\"    Patient has given verbal consent for Telephone visit?  Yes    What phone number would you like to be contacted at? Home    How would you like to obtain your AVS? [email or web address]    Phone call duration: 16 minutes    Pawel Irizarry MD    The patient has had low back issues for a number of years.  He is actually on the schedule with me for a two-level anterior cervical procedure due to radiculopathy.  At our last visit we had ordered a lumbar MRI to investigate the low back issues but they were not too bothersome.  This was in October.  About a week and a half ago he went into the emergency department with a severe onset of acute low back pain.  Occasionally goes down into the legs but is really in the left side of his low back.  It is fifteen out of ten in severity.  At the ER they gave him tizanidine, and it is calming down somewhat.  But is still quite severe.  New MRI was obtained at that visit.    November " 27, 2020 lumbar MRI shows mild L4-5 and L5-S1 spondylosis with some mild lateral recess stenosis but no severe nerve root impingement    I reported that there is no severe structural abnormality on this most recent MRI which is reassuring.  I think he has flared the spondylosis and stenosis.  I recommended an L4-5 interlaminar epidural steroid injection.  I am hopeful this will calm things down.  I would like to follow-up with him after this injection is complete.  If he is having complete and intractable pain that he just cannot live with then we would need to potentially delay his cervical procedure to deal with this new lumbar pain as it sounds like right now it is so severe he might not be able to undergo the operation, but I would like to try and calm things down with an injection before making that decision.

## 2020-12-04 NOTE — LETTER
"    12/4/2020         RE: Pawel Vallejo  [address] [facility] [address]        Dear Colleague,    Thank you for referring your patient, Pawel Vallejo, to the [facility] [place]. Please see a copy of my visit note below.    Pawel Vallejo is a 55 year old male who is being evaluated via a billable telephone visit.      The patient has been notified of following:     \"This telephone visit will be conducted via a call between you and your physician/provider. We have found that certain health care needs can be provided without the need for a physical exam.  This service lets us provide the care you need with a short phone conversation.  If a prescription is necessary we can send it directly to your pharmacy.  If lab work is needed we can place an order for that and you can then stop by our lab to have the test done at a later time.    Telephone visits are billed at different rates depending on your insurance coverage. During this emergency period, for some insurers they may be billed the same as an in-person visit.  Please reach out to your insurance provider with any questions.    If during the course of the call the physician/provider feels a telephone visit is not appropriate, you will not be charged for this service.\"    Patient has given verbal consent for Telephone visit?  Yes    What phone number would you like to be contacted at? Home    How would you like to obtain your AVS? [facility]    Phone call duration: 16 minutes    Pawel Irizarry MD    The patient has had low back issues for a number of years.  He is actually on the schedule with me for a two-level anterior cervical procedure due to radiculopathy.  At our last visit we had ordered a lumbar MRI to investigate the low back issues but they were not too bothersome.  This was in October.  About a week and a half ago he went into the emergency department with a severe onset of acute low back " pain.  Occasionally goes down into the legs but is really in the left side of his low back.  It is fifteen out of ten in severity.  At the ER they gave him tizanidine, and it is calming down somewhat.  But is still quite severe.  New MRI was obtained at that visit.    November 27, 2020 lumbar MRI shows mild L4-5 and L5-S1 spondylosis with some mild lateral recess stenosis but no severe nerve root impingement    I reported that there is no severe structural abnormality on this most recent MRI which is reassuring.  I think he has flared the spondylosis and stenosis.  I recommended an L4-5 interlaminar epidural steroid injection.  I am hopeful this will calm things down.  I would like to follow-up with him after this injection is complete.  If he is having complete and intractable pain that he just cannot live with then we would need to potentially delay his cervical procedure to deal with this new lumbar pain as it sounds like right now it is so severe he might not be able to undergo the operation, but I would like to try and calm things down with an injection before making that decision.      Pawel Irizarry MD

## 2020-12-04 NOTE — TELEPHONE ENCOUNTER
RN called patient. Per patient's request, he would like to have the injection done with [facility]. RN expressed understanding and told RN he will fax the order to [facility] and [facility] will call him with an appt.    Star Lewis RN

## 2020-12-07 ENCOUNTER — TRANSFERRED RECORDS (OUTPATIENT)
Dept: HEALTH INFORMATION MANAGEMENT | Facility: CLINIC | Age: 55
End: 2020-12-07

## 2020-12-08 ENCOUNTER — TELEPHONE (OUTPATIENT)
Dept: ORTHOPEDICS | Facility: CLINIC | Age: 55
End: 2020-12-08

## 2020-12-08 ENCOUNTER — PRE VISIT (OUTPATIENT)
Dept: SURGERY | Facility: CLINIC | Age: 55
End: 2020-12-08

## 2020-12-08 NOTE — TELEPHONE ENCOUNTER
Received call from patient requesting to know Dr Irizarry's recommendations regarding travel during the holiday season. Patient is scheduled for surgery on 12/31/20, and has potential plans to travel to [place] this Thursday - Sunday to visit family. Patient is aware that traveling increases his potential risk to COVID exposure and would like to know if Dr Irizarry advises against this travel. He will adhere to whatever Dr Irizarry recommends.

## 2020-12-11 ENCOUNTER — PRE VISIT (OUTPATIENT)
Dept: SURGERY | Facility: CLINIC | Age: 55
End: 2020-12-11

## 2020-12-14 ENCOUNTER — HEALTH MAINTENANCE LETTER (OUTPATIENT)
Age: 55
End: 2020-12-14

## 2020-12-18 RX ORDER — RIBOFLAVIN (VITAMIN B2) 100 MG
100 TABLET ORAL DAILY
Status: ON HOLD | COMMUNITY
End: 2020-12-31

## 2020-12-18 RX ORDER — MULTIVIT-MIN/IRON/FOLIC ACID/K 18-600-40
1000 CAPSULE ORAL DAILY
Status: ON HOLD | COMMUNITY
End: 2020-12-31

## 2020-12-18 RX ORDER — ZINC GLUCONATE 50 MG
50 TABLET ORAL DAILY
Status: ON HOLD | COMMUNITY
End: 2020-12-31

## 2020-12-18 RX ORDER — NAPROXEN 500 MG/1
250 TABLET ORAL 2 TIMES DAILY PRN
Status: ON HOLD | COMMUNITY
End: 2020-12-31

## 2020-12-18 NOTE — PROGRESS NOTES
Preoperative Assessment Center Medication History Note    Medication history completed via phone call on December 18, 2020 by this writer. See Epic admission navigator for prior to admission medications. Operating room staff will still need to confirm medications and last dose information on day of surgery.     Medication history interview sources:  Patient Interview    Changes made to PTA medication list (reason)  Added:   -- krill oil  -- vit a  -- vit D  -- vit C  -- zinc  -- naproxen    Deleted:   -- norco - no longer taking  -- gabapentin - no longer taking  -- aspirin - no longer taking for pain  -- ibuprofen - on naproxen  -- prevacid - no longer taking  -- losartan - no longer taking    Changed: None    Additional medication history information (including reliability of information, actions taken by pharmacist):    -- No recent (within 30 days) course of antibiotics  -- No recent (within 30 days) course of systemic steroids  -- Patient declines being on any other prescription or over-the-counter medications  -- patient taking both naproxen and tizanidine regularly, informed him that he will need to hold naproxen 4 days prior to the procedure, can you acetaminophen instead    Prior to Admission medications    Medication Sig Last Dose Taking? Auth Provider   KRILL OIL PO Take 1 capsule by mouth daily  Taking Yes Unknown, Entered By History   naproxen (NAPROSYN) 500 MG tablet Take 250 mg by mouth 2 times daily as needed  Taking Yes Unknown, Entered By History   tamsulosin (FLOMAX) 0.4 MG capsule Take 0.4 mg by mouth daily  Taking Yes Reported, Patient   tiZANidine (ZANAFLEX) 4 MG tablet Take 4 mg by mouth 3 times daily as needed for muscle spasms Taking Yes Unknown, Entered By History   VITAMIN A PO Take 1 tablet by mouth daily Taking Yes Unknown, Entered By History   vitamin C (ASCORBIC ACID) 100 MG tablet Take 100 mg by mouth daily  Yes Unknown, Entered By History   Vitamin D, Cholecalciferol, 25 MCG (1000  UT) TABS Take 1,000 Units by mouth daily  Yes Unknown, Entered By History   zinc gluconate 50 MG tablet Take 50 mg by mouth daily Taking Yes Unknown, Entered By History   triamcinolone (KENALOG) 0.1 % external cream    Reported, Patient         Medication history completed by: Juan Jay RP

## 2020-12-21 ENCOUNTER — OFFICE VISIT (OUTPATIENT)
Dept: SURGERY | Facility: CLINIC | Age: 55
End: 2020-12-21
Payer: COMMERCIAL

## 2020-12-21 ENCOUNTER — PRE VISIT (OUTPATIENT)
Dept: SURGERY | Facility: CLINIC | Age: 55
End: 2020-12-21

## 2020-12-21 ENCOUNTER — ANESTHESIA EVENT (OUTPATIENT)
Dept: SURGERY | Facility: CLINIC | Age: 55
End: 2020-12-21
Payer: COMMERCIAL

## 2020-12-21 VITALS
WEIGHT: 175 LBS | BODY MASS INDEX: 25.05 KG/M2 | OXYGEN SATURATION: 97 % | SYSTOLIC BLOOD PRESSURE: 120 MMHG | RESPIRATION RATE: 15 BRPM | HEIGHT: 70 IN | DIASTOLIC BLOOD PRESSURE: 87 MMHG | HEART RATE: 65 BPM

## 2020-12-21 DIAGNOSIS — M54.12 CERVICAL RADICULOPATHY: ICD-10-CM

## 2020-12-21 DIAGNOSIS — Z01.818 PRE-OP EXAMINATION: Primary | ICD-10-CM

## 2020-12-21 DIAGNOSIS — Z01.818 PREOP EXAMINATION: Primary | ICD-10-CM

## 2020-12-21 DIAGNOSIS — M54.2 CERVICAL PAIN: ICD-10-CM

## 2020-12-21 LAB
ANION GAP SERPL CALCULATED.3IONS-SCNC: 6 MMOL/L (ref 3–14)
BUN SERPL-MCNC: 12 MG/DL (ref 7–30)
CALCIUM SERPL-MCNC: 9.1 MG/DL (ref 8.5–10.1)
CHLORIDE SERPL-SCNC: 106 MMOL/L (ref 94–109)
CO2 SERPL-SCNC: 29 MMOL/L (ref 20–32)
CREAT SERPL-MCNC: 0.85 MG/DL (ref 0.66–1.25)
ERYTHROCYTE [DISTWIDTH] IN BLOOD BY AUTOMATED COUNT: 12.6 % (ref 10–15)
GFR SERPL CREATININE-BSD FRML MDRD: >90 ML/MIN/{1.73_M2}
GLUCOSE SERPL-MCNC: 92 MG/DL (ref 70–99)
HCT VFR BLD AUTO: 45.1 % (ref 40–53)
HGB BLD-MCNC: 14.4 G/DL (ref 13.3–17.7)
MCH RBC QN AUTO: 29.6 PG (ref 26.5–33)
MCHC RBC AUTO-ENTMCNC: 31.9 G/DL (ref 31.5–36.5)
MCV RBC AUTO: 93 FL (ref 78–100)
PLATELET # BLD AUTO: 248 10E9/L (ref 150–450)
POTASSIUM SERPL-SCNC: 4.1 MMOL/L (ref 3.4–5.3)
RBC # BLD AUTO: 4.87 10E12/L (ref 4.4–5.9)
SODIUM SERPL-SCNC: 141 MMOL/L (ref 133–144)
WBC # BLD AUTO: 7.9 10E9/L (ref 4–11)

## 2020-12-21 PROCEDURE — 99000 SPECIMEN HANDLING OFFICE-LAB: CPT | Performed by: PATHOLOGY

## 2020-12-21 PROCEDURE — 36415 COLL VENOUS BLD VENIPUNCTURE: CPT | Performed by: PATHOLOGY

## 2020-12-21 PROCEDURE — 86850 RBC ANTIBODY SCREEN: CPT | Performed by: PATHOLOGY

## 2020-12-21 PROCEDURE — 86901 BLOOD TYPING SEROLOGIC RH(D): CPT | Performed by: PATHOLOGY

## 2020-12-21 PROCEDURE — 80048 BASIC METABOLIC PNL TOTAL CA: CPT | Performed by: PATHOLOGY

## 2020-12-21 PROCEDURE — 99203 OFFICE O/P NEW LOW 30 MIN: CPT | Performed by: PHYSICIAN ASSISTANT

## 2020-12-21 PROCEDURE — 86900 BLOOD TYPING SEROLOGIC ABO: CPT | Performed by: PATHOLOGY

## 2020-12-21 PROCEDURE — 85027 COMPLETE CBC AUTOMATED: CPT | Performed by: PATHOLOGY

## 2020-12-21 PROCEDURE — 80307 DRUG TEST PRSMV CHEM ANLYZR: CPT | Mod: 90 | Performed by: PATHOLOGY

## 2020-12-21 SDOH — HEALTH STABILITY: MENTAL HEALTH: HOW OFTEN DO YOU HAVE A DRINK CONTAINING ALCOHOL?: NOT ASKED

## 2020-12-21 SDOH — HEALTH STABILITY: MENTAL HEALTH: HOW MANY STANDARD DRINKS CONTAINING ALCOHOL DO YOU HAVE ON A TYPICAL DAY?: NOT ASKED

## 2020-12-21 SDOH — HEALTH STABILITY: MENTAL HEALTH: HOW OFTEN DO YOU HAVE 6 OR MORE DRINKS ON ONE OCCASION?: NOT ASKED

## 2020-12-21 ASSESSMENT — LIFESTYLE VARIABLES: TOBACCO_USE: 1

## 2020-12-21 ASSESSMENT — MIFFLIN-ST. JEOR: SCORE: 1625.04

## 2020-12-21 ASSESSMENT — ENCOUNTER SYMPTOMS: SEIZURES: 0

## 2020-12-21 ASSESSMENT — PAIN SCALES - GENERAL: PAINLEVEL: NO PAIN (0)

## 2020-12-21 NOTE — H&P
Pre-Operative H & P     CC:  Preoperative exam to assess for increased cardiopulmonary risk while undergoing surgery and anesthesia.    Date of Encounter: 12/21/2020  Primary Care Physician:  Melvi Hale     Reason for visit: pre operative examination, Cervical radiculopathy     HPI  Pawel Vallejo is a 55 year old male who presents for pre-operative H & P in preparation for Cervical 4 to 6 anterior cervical decompression and fusion with medtronic plate and screws, interbody device, use of fluoroscopy, microscope, and left sided iliac crest autograft  with Dr. Irizarry on 12/31/20 at [facility].     The patient is a 55 year old man who has a past medical history significant for HLD, history of HTN, BPH, GERD, former smoker, chronic back pain and cervical radiculopathy. The patient first met with Dr. Irizarry on 10/16/20. He complained of neck pain which radiated down his left arm causing tingling and weakness. He was limited in his work due to the discomfort and had tried a Medrol Dosepak and OTC pain medications without significant relief. They reviewed his MRI and discussed his diagnosis. He was started conservative treatment with PT, nerve root injection and gabapentin. He followed up again virtually on 11/6/20 and they discussed surgical treatment options. The patient was seen then on 12/4/20 for a flare of his chronic lower back pain. They discussed conservative treatment and to address his cervical issues first. The patient is now scheduled for the procedure as above.     History is obtained from the patient and chart review    Past Medical History  Past Medical History:   Diagnosis Date     BPH (benign prostatic hyperplasia)      Chronic back pain      Gastroesophageal reflux disease with esophagitis      History of hypertension      HLD (hyperlipidemia)      Pleurisy        Past Surgical History  Past Surgical History:   Procedure Laterality Date      CHOLECYSTECTOMY       HERNIA REPAIR      Diastasis recti/ abdominal       Hx of Blood transfusions/reactions: yes at 14 years old without issues     Hx of abnormal bleeding or anti-platelet use: none    Menstrual history: No LMP for male patient.:     Steroid use in the last year: none    Personal or FH with difficulty with Anesthesia:  denies    Prior to Admission Medications  Current Outpatient Medications   Medication Sig Dispense Refill     KRILL OIL PO Take 1 capsule by mouth daily        naproxen (NAPROSYN) 500 MG tablet Take 250 mg by mouth 2 times daily as needed        tamsulosin (FLOMAX) 0.4 MG capsule Take 0.4 mg by mouth daily        tiZANidine (ZANAFLEX) 4 MG tablet Take 4 mg by mouth 3 times daily as needed for muscle spasms       triamcinolone (KENALOG) 0.1 % external cream        VITAMIN A PO Take 1 tablet by mouth daily       vitamin C (ASCORBIC ACID) 100 MG tablet Take 100 mg by mouth daily       Vitamin D, Cholecalciferol, 25 MCG (1000 UT) TABS Take 1,000 Units by mouth daily       zinc gluconate 50 MG tablet Take 50 mg by mouth daily         Allergies  No Known Allergies    Social History  Social History     Socioeconomic History     Marital status:      Spouse name: Not on file     Number of children: Not on file     Years of education: Not on file     Highest education level: Not on file   Occupational History     Not on file   Social Needs     Financial resource strain: Not on file     Food insecurity     Worry: Not on file     Inability: Not on file     Transportation needs     Medical: Not on file     Non-medical: Not on file   Tobacco Use     Smoking status: Former Smoker     Packs/day: 0.25     Years: 30.00     Pack years: 7.50     Quit date: 10/2020     Years since quittin.2     Smokeless tobacco: Never Used     Tobacco comment: quit in . restarted in the past couple of years and then smoked more socially.   Substance and Sexual Activity     Alcohol use: Yes     Comment:  socially     Drug use: No     Sexual activity: Yes     Partners: Female   Lifestyle     Physical activity     Days per week: Not on file     Minutes per session: Not on file     Stress: Not on file   Relationships     Social connections     Talks on phone: Not on file     Gets together: Not on file     Attends Christianity service: Not on file     Active member of club or organization: Not on file     Attends meetings of clubs or organizations: Not on file     Relationship status: Not on file     Intimate partner violence     Fear of current or ex partner: Not on file     Emotionally abused: Not on file     Physically abused: Not on file     Forced sexual activity: Not on file   Other Topics Concern     Parent/sibling w/ CABG, MI or angioplasty before 65F 55M? Not Asked   Social History Narrative     Not on file       Family History  History reviewed. No pertinent family history.    Review of Systems    ROS/MED HX    ENT/Pulmonary:     (+)tobacco use, Past use , . .    Neurologic:  - neg neurologic ROS    (-) seizures, CVA and TIA   Cardiovascular:     (+) Dyslipidemia, hypertension----. : . . . :. . Previous cardiac testing date:results:Stress Testdate:2014 results:Interpretation Summary     Normal exercise duration Chest pain with exercise Normal HR and BP response     to exercise Normal Stress ECG Normal systolic wall motion and thickening at     rest Normal systolic augmentation post exercise Normal Stress Echocardiogram     PatientHeight: 70 in     PatientWeight: 180 lbs     SystolicPressure: 132 mmHg     DiastolicPressure: 76 mmHg     HeartRate: 64 bpm     BSA 2.0 m^2 ECG reviewed date:2/3/20 results:SB date: results:          METS/Exercise Tolerance:  4 - Raking leaves, gardening   Hematologic:     (+) History of Transfusion no previous transfusion reaction -     (-) history of blood clots and anemia   Musculoskeletal:   (+)  other musculoskeletal- low back pain, cervical radiculopathy       GI/Hepatic:     (+)  "GERD (PRN symptoms) Other,       Renal/Genitourinary:     (+) BPH,       Endo:  - neg endo ROS       Psychiatric:  - neg psychiatric ROS       Infectious Disease:  - neg infectious disease ROS       Malignancy:      - no malignancy   Other:    (+) H/O Chronic Pain,no other significant disability        The complete review of systems is negative other than noted in the HPI or here.       BP: 120/87 Pulse: 65   Resp: 15 SpO2: 97 %         175 lbs 0 oz  5' 10\"   Body mass index is 25.11 kg/m .       Physical Exam  Constitutional: Awake, alert, cooperative, no apparent distress, and appears stated age.  Eyes: Pupils equal, round and reactive to light, extra ocular muscles intact, sclera clear, conjunctiva normal.  HENT: Normocephalic, oral pharynx with moist mucus membranes, good dentition. No goiter appreciated.   Respiratory: Clear to auscultation bilaterally, no crackles or wheezing.  Cardiovascular: Regular rate and rhythm, normal S1 and S2, and no murmur noted.  Carotids +2, no bruits. No edema. Palpable pulses to radial  DP and PT arteries.   GI: Normal bowel sounds, soft, non-distended, non-tender, no masses palpated, no hepatosplenomegaly.  Surgical scars: well healed  Lymph/Hematologic: No cervical lymphadenopathy and no supraclavicular lymphadenopathy.  Genitourinary:  defer  Skin: Warm and dry.  No rashes at anticipated surgical site.   Musculoskeletal: Full ROM of neck. There is no redness, warmth, or swelling of the joints. Gross motor strength is normal.    Neurologic: Awake, alert, oriented to name, place and time. Cranial nerves II-XII are grossly intact. Gait is normal.   Neuropsychiatric: Calm, cooperative. Normal affect.     Labs: (personally reviewed)  Results for CHRISTINA GUTIERREZ (MRN [id number]) as of 12/21/2020 11:52   Ref. Range 12/21/2020 11:22   Sodium Latest Ref Range: 133 - 144 mmol/L 141   Potassium Latest Ref Range: 3.4 - 5.3 mmol/L 4.1   Chloride Latest Ref Range: 94 - 109 mmol/L 106 "   Carbon Dioxide Latest Ref Range: 20 - 32 mmol/L 29   Urea Nitrogen Latest Ref Range: 7 - 30 mg/dL 12   Creatinine Latest Ref Range: 0.66 - 1.25 mg/dL 0.85   GFR Estimate Latest Ref Range: >60 mL/min/1.73_m2 >90   GFR Estimate If Black Latest Ref Range: >60 mL/min/1.73_m2 >90   Calcium Latest Ref Range: 8.5 - 10.1 mg/dL 9.1   Anion Gap Latest Ref Range: 3 - 14 mmol/L 6   Glucose Latest Ref Range: 70 - 99 mg/dL 92   WBC Latest Ref Range: 4.0 - 11.0 10e9/L 7.9   Hemoglobin Latest Ref Range: 13.3 - 17.7 g/dL 14.4   Hematocrit Latest Ref Range: 40.0 - 53.0 % 45.1   Platelet Count Latest Ref Range: 150 - 450 10e9/L 248   RBC Count Latest Ref Range: 4.4 - 5.9 10e12/L 4.87   MCV Latest Ref Range: 78 - 100 fl 93   MCH Latest Ref Range: 26.5 - 33.0 pg 29.6   MCHC Latest Ref Range: 31.5 - 36.5 g/dL 31.9   RDW Latest Ref Range: 10.0 - 15.0 % 12.6       EK/3/20     Sinus bradycardia          Stress test      Interpretation Summary     Normal exercise duration Chest pain with exercise Normal HR and BP response      to exercise Normal Stress ECG Normal systolic wall motion and thickening at      rest Normal systolic augmentation post exercise Normal Stress Echocardiogram     PatientHeight: 70 in     PatientWeight: 180 lbs     SystolicPressure: 132 mmHg     DiastolicPressure: 76 mmHg     HeartRate: 64 bpm     BSA 2.0 m^2     The patient's records and results personally reviewed by this provider.     Outside records reviewed from: care everywhere    ASSESSMENT and PLAN  Gustavo is a 55 year old man who is scheduled for Cervical 4 to 6 anterior cervical decompression and fusion with medtronic plate and screws, interbody device, use of fluoroscopy, microscope, and left sided iliac crest autograft  on 20 by Dr. Irizarry in treatment of Cervical radiculopathy .  PAC referral for risk assessment and optimization for anesthesia with comorbid conditions of HLD, history of HTN, former smoker, GERD and BPH:    Pre-operative  considerations:  1.  Cardiac:  Functional status- METS 4, the patient is able to walk several flights of stairs without issues.  Intermediate risk surgery with 0.4% (RCRI #) risk of major adverse cardiac event. The patient had a recent EKG on 2/3/20 and denies any new symptoms.   ~ HLD - not currently on medications.   ~ History of HTN- was taking cozaar but stopped. Normal blood pressure today 120/87.     2.  Pulm:  Airway feasible.  DEEPIKA risk: Low (male, age)  ~ Former smoker - smoked 0.25 ppd for 30 years and then quit in 2012 but then restarted socially 2 years ago. Currently no smoking for over a month. PRN nicotine patches.     3. Heme: history of transfusion when he was 14 years old. Will check T/S today.     4. GI:  Risk of PONV score = 2.  If > 2, anti-emetic intervention recommended.  ~ GERD - occasional symptoms. PRn prevacid.     5. : BPH - continue flomax.     6. Musculoskeletal: Cervical radiculopathy/ low back pain - continue PRN flexeril. Procedure as above and consideration for careful positioning to minimize discomfort.     VTE risk: 0.5%    The patient is optimized for their procedure. AVS with information on surgery time/arrival time, meds and NPO status given by nursing staff.        Jeana Patel PA-C  Preoperative Assessment Center  [facility]  Clinic and Surgery Center  Phone: [phone]  Fax: [phone]

## 2020-12-21 NOTE — PATIENT INSTRUCTIONS
Preparing for Your Surgery      Name:  Pawel Vallejo   MRN:  [id number]   :  1965   Today's Date:  2020       Arriving for surgery:  Surgery date:  20  Arrival time:  5:30 am    Restrictions due to COVID 19:  No visitors are allowed at this time.    [facility] parking is available for anyone with mobility limitations or disabilities.  ([place]  24 hours/ 7 days a week; [facility]  7 am- 3:30 pm, Mon- Fri)    Please come to:  [facility] [address]  [address] [place]  [address]  -Proceed to the 3rd floor, check in at the Adult Surgery Waiting Lounge. [phone]    If an escort is needed stop at the Information Desk in the lobby. Inform the information person that you are here for surgery. An escort to the Adult Surgery Waiting Lounge will be provided.    What can I eat or drink?  -  You may eat and drink normally for up to 8 hours before your surgery. (Until 11:30 pm 20)  -  You may have clear liquids until 2 hours before surgery. (Until 5:30 am)    Examples of clear liquids:  Water  Clear broth  Juices (apple, white grape, white cranberry  and cider) without pulp  Noncarbonated, powder based beverages  (lemonade and Leif-Aid)  Sodas (Sprite, 7-Up, ginger ale and seltzer)  Coffee or tea (without milk or cream)  Gatorade    -  No Alcohol for at least 24 hours before surgery     Which medicines can I take?  Hold Aspirin for 7 days before surgery.   Hold Multivitamins for 7 days before surgery.  Hold Supplements (Krill Oil) for 7 days before surgery. Take last Krill Oil prior to surgery on 20 and hold until surgery.  Hold Ibuprofen (Advil, Motrin) for 1 day before surgery--unless otherwise directed by surgeon.  Hold Naproxen (Aleve/Naprosyn) for 4 days before surgery. May take last dose of Naproxen if needed on 20 and hold until surgery.    -  DO NOT take these medications the day of surgery:  Vitamin A, Vitamin C, Vitamin D, Zinc  Gluconate, Kenalog cream    -  PLEASE TAKE these medications the day of surgery:  Tamsulosin (Flomax),   Tizanidine (Zanaflex) if needed  Acetaminophen (Tylenol) if needed    How do I prepare myself?  - Please take 2 showers before surgery using Scrubcare or Hibiclens soap.    Use this soap only from the neck to your toes.     Leave the soap on your skin for one minute--then rinse thoroughly.      You may use your own shampoo and conditioner; no other hair products.   - Please remove all jewelry and body piercings.  - No lotions, deodorants or fragrance.  - Bring your ID and insurance card.    - All patients are required to have a Covid-19 test within 4 days of surgery/procedure.      -Patients will be contacted by the [facility] scheduling team within 1 week of surgery to make an appointment.      - Patients may call the Scheduling team at [phone] if they have not been scheduled within 4 days of  surgery.    ALL PATIENTS GOING HOME THE SAME DAY OF SURGERY ARE REQUIRED TO HAVE A RESPONSIBLE ADULT TO DRIVE AND BE IN ATTENDANCE WITH THEM FOR 24 HOURS FOLLOWING SURGERY     Questions or Concerns:    - For any questions regarding the day of surgery or your hospital stay, please contact the Pre Admission Nursing Office at [phone].       - If you have health changes between today and your surgery please call your surgeon.       For questions after surgery please call your surgeons office.     AFTER YOUR SURGERY  Breathing exercises   Breathing exercises help you recover faster. Take deep breaths and let the air out slowly. This will:     Help you wake up after surgery.    Help prevent complications like pneumonia.  Preventing complications will help you go home sooner.   We may give you a breathing device (incentive spirometer) to encourage you to breathe deeply.   Nausea and vomiting   You may feel sick to your stomach after surgery; if so, let your nurse know.    Pain control:  After surgery, you may  have pain. Our goal is to help you manage your pain. Pain medicine will help you feel comfortable enough to do activities that will help you heal.  These activities may include breathing exercises, walking and physical therapy.   To help your health care team treat your pain we will ask: 1) If you have pain  2) where it is located 3) describe your pain in your words  Methods of pain control include medications given by mouth, vein or by nerve block for some surgeries.  Sequential Compression Device (SCD):  You may need to wear SCD S (also called pneumo boots)on your legs or feet. These are wraps connected to a machine that pumps in air and releases it. The repeated pumping helps prevent blood clots from forming.

## 2020-12-21 NOTE — ANESTHESIA PREPROCEDURE EVALUATION
Anesthesia Pre-Procedure Evaluation    Patient: Pawel Vallejo   MRN:     [id number] Gender:   male   Age:    55 year old :      1965        Preoperative Diagnosis: Cervical radiculopathy [M54.12]   Procedure(s):  ,Cervical 4 to 6 anterior cervical decompression and fusion with medtronic plate and screws, interbody device, use of fluoroscopy, microscope  and left sided iliac crest autograft     Results for CHRISTINA VALLEJO (MRN [id number]) as of 2020 11:52   Ref. Range 2020 11:22   Sodium Latest Ref Range: 133 - 144 mmol/L 141   Potassium Latest Ref Range: 3.4 - 5.3 mmol/L 4.1   Chloride Latest Ref Range: 94 - 109 mmol/L 106   Carbon Dioxide Latest Ref Range: 20 - 32 mmol/L 29   Urea Nitrogen Latest Ref Range: 7 - 30 mg/dL 12   Creatinine Latest Ref Range: 0.66 - 1.25 mg/dL 0.85   GFR Estimate Latest Ref Range: >60 mL/min/1.73_m2 >90   GFR Estimate If Black Latest Ref Range: >60 mL/min/1.73_m2 >90   Calcium Latest Ref Range: 8.5 - 10.1 mg/dL 9.1   Anion Gap Latest Ref Range: 3 - 14 mmol/L 6   Glucose Latest Ref Range: 70 - 99 mg/dL 92   WBC Latest Ref Range: 4.0 - 11.0 10e9/L 7.9   Hemoglobin Latest Ref Range: 13.3 - 17.7 g/dL 14.4   Hematocrit Latest Ref Range: 40.0 - 53.0 % 45.1   Platelet Count Latest Ref Range: 150 - 450 10e9/L 248   RBC Count Latest Ref Range: 4.4 - 5.9 10e12/L 4.87   MCV Latest Ref Range: 78 - 100 fl 93   MCH Latest Ref Range: 26.5 - 33.0 pg 29.6   MCHC Latest Ref Range: 31.5 - 36.5 g/dL 31.9   RDW Latest Ref Range: 10.0 - 15.0 % 12.6     Preop Vitals    BP Readings from Last 3 Encounters:   20 120/87   20 120/75   14 117/74    Pulse Readings from Last 3 Encounters:   20 65   20 67   14 61      Resp Readings from Last 3 Encounters:   20 15   20 14   14 16    SpO2 Readings from Last 3 Encounters:   20 97%   20 94%   14 96%      Temp Readings from Last 1 Encounters:   20 97  F (36.1  C)  "(Temporal)    Ht Readings from Last 1 Encounters:   12/21/20 1.778 m (5' 10\")      Wt Readings from Last 1 Encounters:   12/21/20 79.4 kg (175 lb)    Estimated body mass index is 25.11 kg/m  as calculated from the following:    Height as of this encounter: 1.778 m (5' 10\").    Weight as of this encounter: 79.4 kg (175 lb).     LDA:        Past Medical History:   Diagnosis Date     Chronic back pain      Pleurisy       Past Surgical History:   Procedure Laterality Date     CHOLECYSTECTOMY       HERNIA REPAIR        No Known Allergies     Anesthesia Evaluation     . Pt has had prior anesthetic. Type: General and Regional    No history of anesthetic complications          ROS/MED HX    ENT/Pulmonary:     (+)tobacco use, Past use , . .    Neurologic:  - neg neurologic ROS    (-) seizures, CVA and TIA   Cardiovascular:     (+) Dyslipidemia, hypertension----. : . . . :. . Previous cardiac testing date:results:Stress Testdate:2014 results:Interpretation Summary     Normal exercise duration Chest pain with exercise Normal HR and BP response     to exercise Normal Stress ECG Normal systolic wall motion and thickening at     rest Normal systolic augmentation post exercise Normal Stress Echocardiogram     PatientHeight: 70 in     PatientWeight: 180 lbs     SystolicPressure: 132 mmHg     DiastolicPressure: 76 mmHg     HeartRate: 64 bpm     BSA 2.0 m^2 ECG reviewed date:2/3/20 results:SB date: results:          METS/Exercise Tolerance:  4 - Raking leaves, gardening   Hematologic:     (+) History of Transfusion no previous transfusion reaction -     (-) history of blood clots and anemia   Musculoskeletal:   (+)  other musculoskeletal- low back pain, cervical radiculopathy       GI/Hepatic:     (+) GERD (PRN symptoms) Other,       Renal/Genitourinary:     (+) BPH,       Endo:  - neg endo ROS       Psychiatric:  - neg psychiatric ROS       Infectious Disease:  - neg infectious disease ROS       Malignancy:      - no malignancy "   Other:    (+) H/O Chronic Pain,no other significant disability                        PHYSICAL EXAM:   Mental Status/Neuro: A/A/O; Age Appropriate   Airway: Facies: Feasible  Mallampati: I  Mouth/Opening: Full  TM distance: > 6 cm  Neck ROM: Full   Respiratory: Auscultation: CTAB     Resp. Rate: Normal     Resp. Effort: Normal      CV: Rhythm: Regular  Heart: Normal Sounds  Edema: None  Pulses: Normal  Neck: Normal   Comments:                      Assessment:   ASA SCORE: 2            Plan:   Anes. Type:  General   Pre-Medication: None   Induction:  IV (Standard)   Airway: ETT; Oral; CMAC/VL   Access/Monitoring: PIV   Maintenance: Balanced     Postop Plan:   Postop Pain: Opioids  Postop Sedation/Airway: Not planned     PONV Management:   Adult Risk Factors:, Postop Opioids   Prevention: Ondansetron, Dexamethasone                PAC Discussion and Assessment    ASA Classification: 2  Case is suitable for: [facility]  Anesthetic techniques and relevant risks discussed: GA  Invasive monitoring and risk discussed:   Types:   Possibility and Risk of blood transfusion discussed:   NPO instructions given:   Additional anesthetic preparation and risks discussed:   Needs early admission to pre-op area:   Other:     PAC Resident/NP Anesthesia Assessment:  Gustavo is a 55 year old man who is scheduled for Cervical 4 to 6 anterior cervical decompression and fusion with medtronic plate and screws, interbody device, use of fluoroscopy, microscope, and left sided iliac crest autograft  on 12/31/20 by Dr. Irizarry in treatment of Cervical radiculopathy .  PAC referral for risk assessment and optimization for anesthesia with comorbid conditions of HLD, history of HTN, former smoker, GERD and BPH:    Pre-operative considerations:  1.  Cardiac:  Functional status- METS 4, the patient is able to walk several flights of stairs without issues.  Intermediate risk surgery with 0.4% (RCRI #) risk of major adverse cardiac event. The patient had  a recent EKG on 2/3/20 and denies any new symptoms.   ~ HLD - not currently on medications.   ~ History of HTN- was taking cozaar but stopped. Normal blood pressure today 120/87.     2.  Pulm:  Airway feasible.  DEEPIKA risk: Low (male, age)  ~ Former smoker - smoked 0.25 ppd for 30 years and then quit in 2012 but then restarted socially 2 years ago. Currently no smoking for over a month. PRN nicotine patches.     3. Heme: history of transfusion when he was 14 years old. Will check T/S today.     4. GI:  Risk of PONV score = 2.  If > 2, anti-emetic intervention recommended.  ~ GERD - occasional symptoms. PRn prevacid.     5. : BPH - continue flomax.     6. Musculoskeletal: Cervical radiculopathy/ low back pain - continue PRN flexeril. Procedure as above and consideration for careful positioning to minimize discomfort.     VTE risk: 0.5%    Patient is optimized and is acceptable candidate for the proposed procedure.  No further diagnostic evaluation is needed.     For further details of assessment, testing, and physical exam please see H and P completed on same date.    Jeana Patel PA-C          Mid-Level Provider/Resident: Jeana Patel PA-C  Date: 12/21/20  Time:     Attending Anesthesiologist Anesthesia Assessment:        Anesthesiologist:   Date:   Time:   Pass/Fail:   Disposition:     PAC Pharmacist Assessment:        Pharmacist:   Date:   Time:    Jeana Patel PA-C

## 2020-12-23 LAB — COTININE UR QL: NEGATIVE NG/ML

## 2020-12-28 ENCOUNTER — HOSPITAL ENCOUNTER (OUTPATIENT)
Facility: CLINIC | Age: 55
Setting detail: SPECIMEN
Discharge: HOME | End: 2020-12-28
Admitting: PATHOLOGY
Payer: COMMERCIAL

## 2020-12-28 DIAGNOSIS — Z11.59 ENCOUNTER FOR SCREENING FOR OTHER VIRAL DISEASES: ICD-10-CM

## 2020-12-28 LAB
SARS-COV-2 RNA SPEC QL NAA+PROBE: NORMAL
SPECIMEN SOURCE: NORMAL

## 2020-12-28 PROCEDURE — 87635 SARS-COV-2 COVID-19 AMP PRB: CPT | Performed by: PATHOLOGY

## 2020-12-29 ENCOUNTER — TELEPHONE (OUTPATIENT)
Dept: ORTHOPEDICS | Facility: CLINIC | Age: 55
End: 2020-12-29

## 2020-12-29 LAB
LABORATORY COMMENT REPORT: NORMAL
SARS-COV-2 RNA SPEC QL NAA+PROBE: NEGATIVE
SPECIMEN SOURCE: NORMAL

## 2020-12-29 NOTE — TELEPHONE ENCOUNTER
RN called and spoke with patient.  Answered all his questions for post op care.  Patient expressed understanding.    Star Lewis RN

## 2020-12-31 ENCOUNTER — APPOINTMENT (OUTPATIENT)
Dept: GENERAL RADIOLOGY | Facility: CLINIC | Age: 55
End: 2020-12-31
Attending: ORTHOPAEDIC SURGERY
Payer: COMMERCIAL

## 2020-12-31 ENCOUNTER — HOSPITAL ENCOUNTER (OUTPATIENT)
Facility: CLINIC | Age: 55
Discharge: HOME | End: 2020-12-31
Attending: ORTHOPAEDIC SURGERY | Admitting: ORTHOPAEDIC SURGERY
Payer: COMMERCIAL

## 2020-12-31 ENCOUNTER — ANESTHESIA (OUTPATIENT)
Dept: SURGERY | Facility: CLINIC | Age: 55
End: 2020-12-31
Payer: COMMERCIAL

## 2020-12-31 VITALS
BODY MASS INDEX: 24.55 KG/M2 | HEIGHT: 70 IN | TEMPERATURE: 98.6 F | SYSTOLIC BLOOD PRESSURE: 138 MMHG | OXYGEN SATURATION: 96 % | WEIGHT: 171.52 LBS | RESPIRATION RATE: 16 BRPM | DIASTOLIC BLOOD PRESSURE: 74 MMHG | HEART RATE: 78 BPM

## 2020-12-31 DIAGNOSIS — M54.12 CERVICAL RADICULOPATHY: ICD-10-CM

## 2020-12-31 PROBLEM — D12.6 ADENOMATOUS POLYP OF COLON: Status: ACTIVE | Noted: 2020-12-31

## 2020-12-31 PROBLEM — M47.812 CERVICAL SPONDYLOSIS: Status: ACTIVE | Noted: 2020-12-31

## 2020-12-31 PROBLEM — M75.122 COMPLETE TEAR OF LEFT ROTATOR CUFF: Status: ACTIVE | Noted: 2018-10-02

## 2020-12-31 PROBLEM — E78.5 HYPERLIPIDEMIA, UNSPECIFIED: Status: ACTIVE | Noted: 2020-12-31

## 2020-12-31 LAB
ABO + RH BLD: NORMAL
ABO + RH BLD: NORMAL
BLD GP AB SCN SERPL QL: NORMAL
BLOOD BANK CMNT PATIENT-IMP: NORMAL
BLOOD BANK CMNT PATIENT-IMP: NORMAL
GLUCOSE BLDC GLUCOMTR-MCNC: 100 MG/DL (ref 70–99)
SPECIMEN EXP DATE BLD: NORMAL

## 2020-12-31 PROCEDURE — C1762 CONN TISS, HUMAN(INC FASCIA): HCPCS | Performed by: ORTHOPAEDIC SURGERY

## 2020-12-31 PROCEDURE — 20937 SP BONE AGRFT MORSEL ADD-ON: CPT | Mod: GC | Performed by: ORTHOPAEDIC SURGERY

## 2020-12-31 PROCEDURE — 20936 SP BONE AGRFT LOCAL ADD-ON: CPT | Mod: GC | Performed by: ORTHOPAEDIC SURGERY

## 2020-12-31 PROCEDURE — 250N000011 HC RX IP 250 OP 636: Performed by: PHYSICIAN ASSISTANT

## 2020-12-31 PROCEDURE — 999N001017 HC STATISTIC GLUCOSE BY METER IP

## 2020-12-31 PROCEDURE — 999N000139 HC STATISTIC PRE-PROCEDURE ASSESSMENT II: Performed by: ORTHOPAEDIC SURGERY

## 2020-12-31 PROCEDURE — L0174 CERV SR 2PC THOR EXT PRE OTS: HCPCS

## 2020-12-31 PROCEDURE — 761N000007 HC RECOVERY PHASE 2 EACH 15 MINS: Performed by: ORTHOPAEDIC SURGERY

## 2020-12-31 PROCEDURE — 22552 ARTHRD ANT NTRBD CERVICAL EA: CPT | Mod: GC | Performed by: ORTHOPAEDIC SURGERY

## 2020-12-31 PROCEDURE — 370N000001 HC ANESTHESIA TECHNICAL FEE, 1ST 30 MIN: Performed by: ORTHOPAEDIC SURGERY

## 2020-12-31 PROCEDURE — 250N000013 HC RX MED GY IP 250 OP 250 PS 637: Performed by: STUDENT IN AN ORGANIZED HEALTH CARE EDUCATION/TRAINING PROGRAM

## 2020-12-31 PROCEDURE — 250N000013 HC RX MED GY IP 250 OP 250 PS 637: Performed by: PHYSICIAN ASSISTANT

## 2020-12-31 PROCEDURE — 22845 INSERT SPINE FIXATION DEVICE: CPT | Mod: 59 | Performed by: ORTHOPAEDIC SURGERY

## 2020-12-31 PROCEDURE — 250N000011 HC RX IP 250 OP 636: Performed by: ORTHOPAEDIC SURGERY

## 2020-12-31 PROCEDURE — C1713 ANCHOR/SCREW BN/BN,TIS/BN: HCPCS | Performed by: ORTHOPAEDIC SURGERY

## 2020-12-31 PROCEDURE — 258N000003 HC RX IP 258 OP 636: Performed by: NURSE ANESTHETIST, CERTIFIED REGISTERED

## 2020-12-31 PROCEDURE — 250N000002 HC ISOFLURANE, EA 15 MIN: Performed by: ORTHOPAEDIC SURGERY

## 2020-12-31 PROCEDURE — 22853 INSJ BIOMECHANICAL DEVICE: CPT | Mod: GC | Performed by: ORTHOPAEDIC SURGERY

## 2020-12-31 PROCEDURE — 258N000003 HC RX IP 258 OP 636: Performed by: ANESTHESIOLOGY

## 2020-12-31 PROCEDURE — 999N000180 XR SURGERY CARM FLUORO LESS THAN 5 MIN: Mod: TC

## 2020-12-31 PROCEDURE — L1499 SPINAL ORTHOSIS NOS: HCPCS

## 2020-12-31 PROCEDURE — 22551 ARTHRD ANT NTRBDY CERVICAL: CPT | Mod: GC | Performed by: ORTHOPAEDIC SURGERY

## 2020-12-31 PROCEDURE — 360N000037 HC SURGERY LEVEL 5 W FLUORO 1ST 30 MIN - UMMC: Performed by: ORTHOPAEDIC SURGERY

## 2020-12-31 PROCEDURE — 272N000004 HC RX 272: Performed by: ORTHOPAEDIC SURGERY

## 2020-12-31 PROCEDURE — 250N000011 HC RX IP 250 OP 636: Performed by: NURSE ANESTHETIST, CERTIFIED REGISTERED

## 2020-12-31 PROCEDURE — 761N000004 HC RECOVERY PHASE 1 LEVEL 2 EA ADDTL HR: Performed by: ORTHOPAEDIC SURGERY

## 2020-12-31 PROCEDURE — 761N000003 HC RECOVERY PHASE 1 LEVEL 2 FIRST HR: Performed by: ORTHOPAEDIC SURGERY

## 2020-12-31 PROCEDURE — 250N000011 HC RX IP 250 OP 636: Performed by: ANESTHESIOLOGY

## 2020-12-31 PROCEDURE — 360N000035 HC SURGERY LEVEL 5 EA 15 ADDTL MIN - UMMC: Performed by: ORTHOPAEDIC SURGERY

## 2020-12-31 PROCEDURE — 272N000001 HC OR GENERAL SUPPLY STERILE: Performed by: ORTHOPAEDIC SURGERY

## 2020-12-31 PROCEDURE — 370N000002 HC ANESTHESIA TECHNICAL FEE, EACH ADDTL 15 MIN: Performed by: ORTHOPAEDIC SURGERY

## 2020-12-31 PROCEDURE — 250N000009 HC RX 250: Performed by: NURSE ANESTHETIST, CERTIFIED REGISTERED

## 2020-12-31 DEVICE — IMPLANT 6240786 ANATOMIC 18X16X7MM
Type: IMPLANTABLE DEVICE | Site: SPINE CERVICAL | Status: FUNCTIONAL
Brand: VERTE-STACK® SPINAL SYSTEM

## 2020-12-31 DEVICE — IMP PLATE CERV MEDT ZEVO 37MM 2 LVL 3002037: Type: IMPLANTABLE DEVICE | Site: SPINE CERVICAL | Status: FUNCTIONAL

## 2020-12-31 DEVICE — IMP SCR MEDT ZEVO 3.5X17MM ST VA 7723517: Type: IMPLANTABLE DEVICE | Site: SPINE CERVICAL | Status: FUNCTIONAL

## 2020-12-31 DEVICE — IMPLANTABLE DEVICE: Type: IMPLANTABLE DEVICE | Site: SPINE CERVICAL | Status: FUNCTIONAL

## 2020-12-31 DEVICE — GRAFT BONE CRUSH CANC 15ML 400075: Type: IMPLANTABLE DEVICE | Site: HIP | Status: FUNCTIONAL

## 2020-12-31 RX ORDER — DEXAMETHASONE SODIUM PHOSPHATE 4 MG/ML
INJECTION, SOLUTION INTRA-ARTICULAR; INTRALESIONAL; INTRAMUSCULAR; INTRAVENOUS; SOFT TISSUE PRN
Status: DISCONTINUED | OUTPATIENT
Start: 2020-12-31 | End: 2020-12-31

## 2020-12-31 RX ORDER — SODIUM CHLORIDE, SODIUM LACTATE, POTASSIUM CHLORIDE, CALCIUM CHLORIDE 600; 310; 30; 20 MG/100ML; MG/100ML; MG/100ML; MG/100ML
INJECTION, SOLUTION INTRAVENOUS CONTINUOUS
Status: DISCONTINUED | OUTPATIENT
Start: 2020-12-31 | End: 2020-12-31 | Stop reason: HOSPADM

## 2020-12-31 RX ORDER — LIDOCAINE 40 MG/G
CREAM TOPICAL
Status: DISCONTINUED | OUTPATIENT
Start: 2020-12-31 | End: 2020-12-31 | Stop reason: HOSPADM

## 2020-12-31 RX ORDER — EPHEDRINE SULFATE 50 MG/ML
INJECTION, SOLUTION INTRAMUSCULAR; INTRAVENOUS; SUBCUTANEOUS PRN
Status: DISCONTINUED | OUTPATIENT
Start: 2020-12-31 | End: 2020-12-31

## 2020-12-31 RX ORDER — FENTANYL CITRATE 50 UG/ML
INJECTION, SOLUTION INTRAMUSCULAR; INTRAVENOUS PRN
Status: DISCONTINUED | OUTPATIENT
Start: 2020-12-31 | End: 2020-12-31

## 2020-12-31 RX ORDER — NALOXONE HYDROCHLORIDE 0.4 MG/ML
0.4 INJECTION, SOLUTION INTRAMUSCULAR; INTRAVENOUS; SUBCUTANEOUS
Status: DISCONTINUED | OUTPATIENT
Start: 2020-12-31 | End: 2020-12-31 | Stop reason: HOSPADM

## 2020-12-31 RX ORDER — VANCOMYCIN HYDROCHLORIDE 1 G/20ML
INJECTION, POWDER, LYOPHILIZED, FOR SOLUTION INTRAVENOUS PRN
Status: DISCONTINUED | OUTPATIENT
Start: 2020-12-31 | End: 2020-12-31 | Stop reason: HOSPADM

## 2020-12-31 RX ORDER — LIDOCAINE HYDROCHLORIDE 20 MG/ML
INJECTION, SOLUTION INFILTRATION; PERINEURAL PRN
Status: DISCONTINUED | OUTPATIENT
Start: 2020-12-31 | End: 2020-12-31

## 2020-12-31 RX ORDER — CEFAZOLIN SODIUM 2 G/100ML
2 INJECTION, SOLUTION INTRAVENOUS
Status: COMPLETED | OUTPATIENT
Start: 2020-12-31 | End: 2020-12-31

## 2020-12-31 RX ORDER — PROPOFOL 10 MG/ML
INJECTION, EMULSION INTRAVENOUS PRN
Status: DISCONTINUED | OUTPATIENT
Start: 2020-12-31 | End: 2020-12-31

## 2020-12-31 RX ORDER — OXYCODONE HYDROCHLORIDE 5 MG/1
5 TABLET ORAL
Status: COMPLETED | OUTPATIENT
Start: 2020-12-31 | End: 2020-12-31

## 2020-12-31 RX ORDER — ONDANSETRON 2 MG/ML
INJECTION INTRAMUSCULAR; INTRAVENOUS PRN
Status: DISCONTINUED | OUTPATIENT
Start: 2020-12-31 | End: 2020-12-31

## 2020-12-31 RX ORDER — PROPOFOL 10 MG/ML
INJECTION, EMULSION INTRAVENOUS CONTINUOUS PRN
Status: DISCONTINUED | OUTPATIENT
Start: 2020-12-31 | End: 2020-12-31

## 2020-12-31 RX ORDER — CEFAZOLIN SODIUM 1 G/3ML
1 INJECTION, POWDER, FOR SOLUTION INTRAMUSCULAR; INTRAVENOUS SEE ADMIN INSTRUCTIONS
Status: DISCONTINUED | OUTPATIENT
Start: 2020-12-31 | End: 2020-12-31 | Stop reason: HOSPADM

## 2020-12-31 RX ORDER — MEPERIDINE HYDROCHLORIDE 25 MG/ML
12.5 INJECTION INTRAMUSCULAR; INTRAVENOUS; SUBCUTANEOUS
Status: DISCONTINUED | OUTPATIENT
Start: 2020-12-31 | End: 2020-12-31 | Stop reason: HOSPADM

## 2020-12-31 RX ORDER — ACETAMINOPHEN 325 MG/1
975 TABLET ORAL ONCE
Status: COMPLETED | OUTPATIENT
Start: 2020-12-31 | End: 2020-12-31

## 2020-12-31 RX ORDER — ONDANSETRON 4 MG/1
4 TABLET, ORALLY DISINTEGRATING ORAL EVERY 30 MIN PRN
Status: DISCONTINUED | OUTPATIENT
Start: 2020-12-31 | End: 2020-12-31 | Stop reason: HOSPADM

## 2020-12-31 RX ORDER — HYDROMORPHONE HYDROCHLORIDE 1 MG/ML
.3-.5 INJECTION, SOLUTION INTRAMUSCULAR; INTRAVENOUS; SUBCUTANEOUS EVERY 10 MIN PRN
Status: DISCONTINUED | OUTPATIENT
Start: 2020-12-31 | End: 2020-12-31 | Stop reason: HOSPADM

## 2020-12-31 RX ORDER — NALOXONE HYDROCHLORIDE 0.4 MG/ML
0.2 INJECTION, SOLUTION INTRAMUSCULAR; INTRAVENOUS; SUBCUTANEOUS
Status: DISCONTINUED | OUTPATIENT
Start: 2020-12-31 | End: 2020-12-31 | Stop reason: HOSPADM

## 2020-12-31 RX ORDER — ACETAMINOPHEN 325 MG/1
650 TABLET ORAL
Status: DISCONTINUED | OUTPATIENT
Start: 2020-12-31 | End: 2020-12-31 | Stop reason: HOSPADM

## 2020-12-31 RX ORDER — OXYCODONE HYDROCHLORIDE 5 MG/1
5-10 TABLET ORAL EVERY 4 HOURS PRN
Qty: 40 TABLET | Refills: 0 | Status: SHIPPED | OUTPATIENT
Start: 2020-12-31 | End: 2021-01-05

## 2020-12-31 RX ORDER — GABAPENTIN 100 MG/1
300 CAPSULE ORAL
Status: COMPLETED | OUTPATIENT
Start: 2020-12-31 | End: 2020-12-31

## 2020-12-31 RX ORDER — SENNA AND DOCUSATE SODIUM 50; 8.6 MG/1; MG/1
1 TABLET, FILM COATED ORAL 2 TIMES DAILY PRN
Qty: 60 TABLET | Refills: 0 | Status: SHIPPED | OUTPATIENT
Start: 2020-12-31 | End: 2021-04-02

## 2020-12-31 RX ORDER — ONDANSETRON 2 MG/ML
4 INJECTION INTRAMUSCULAR; INTRAVENOUS EVERY 30 MIN PRN
Status: DISCONTINUED | OUTPATIENT
Start: 2020-12-31 | End: 2020-12-31 | Stop reason: HOSPADM

## 2020-12-31 RX ADMIN — ACETAMINOPHEN 650 MG: 325 TABLET, FILM COATED ORAL at 12:32

## 2020-12-31 RX ADMIN — Medication 1 G: at 09:35

## 2020-12-31 RX ADMIN — ACETAMINOPHEN 975 MG: 325 TABLET, FILM COATED ORAL at 06:47

## 2020-12-31 RX ADMIN — PHENYLEPHRINE HYDROCHLORIDE 100 MCG: 10 INJECTION INTRAVENOUS at 08:05

## 2020-12-31 RX ADMIN — PHENYLEPHRINE HYDROCHLORIDE 300 MCG: 10 INJECTION INTRAVENOUS at 08:20

## 2020-12-31 RX ADMIN — FENTANYL CITRATE 50 MCG: 50 INJECTION, SOLUTION INTRAMUSCULAR; INTRAVENOUS at 09:35

## 2020-12-31 RX ADMIN — SUGAMMADEX 200 MG: 100 INJECTION, SOLUTION INTRAVENOUS at 09:56

## 2020-12-31 RX ADMIN — ONDANSETRON 4 MG: 2 INJECTION INTRAMUSCULAR; INTRAVENOUS at 09:47

## 2020-12-31 RX ADMIN — HYDROMORPHONE HYDROCHLORIDE 0.5 MG: 1 INJECTION, SOLUTION INTRAMUSCULAR; INTRAVENOUS; SUBCUTANEOUS at 12:25

## 2020-12-31 RX ADMIN — PHENYLEPHRINE HYDROCHLORIDE 100 MCG: 10 INJECTION INTRAVENOUS at 07:53

## 2020-12-31 RX ADMIN — GABAPENTIN 300 MG: 300 CAPSULE ORAL at 06:47

## 2020-12-31 RX ADMIN — OXYCODONE HYDROCHLORIDE 5 MG: 5 TABLET ORAL at 11:26

## 2020-12-31 RX ADMIN — Medication 10 MG: at 08:22

## 2020-12-31 RX ADMIN — PHENYLEPHRINE HYDROCHLORIDE 200 MCG: 10 INJECTION INTRAVENOUS at 08:17

## 2020-12-31 RX ADMIN — ROCURONIUM BROMIDE 20 MG: 10 INJECTION INTRAVENOUS at 08:11

## 2020-12-31 RX ADMIN — HYDROMORPHONE HYDROCHLORIDE 0.2 MG: 1 INJECTION, SOLUTION INTRAMUSCULAR; INTRAVENOUS; SUBCUTANEOUS at 09:41

## 2020-12-31 RX ADMIN — ROCURONIUM BROMIDE 50 MG: 10 INJECTION INTRAVENOUS at 07:30

## 2020-12-31 RX ADMIN — HYDROMORPHONE HYDROCHLORIDE 0.3 MG: 1 INJECTION, SOLUTION INTRAMUSCULAR; INTRAVENOUS; SUBCUTANEOUS at 11:13

## 2020-12-31 RX ADMIN — PHENYLEPHRINE HYDROCHLORIDE 200 MCG: 10 INJECTION INTRAVENOUS at 08:14

## 2020-12-31 RX ADMIN — HYDROMORPHONE HYDROCHLORIDE 0.5 MG: 1 INJECTION, SOLUTION INTRAMUSCULAR; INTRAVENOUS; SUBCUTANEOUS at 10:21

## 2020-12-31 RX ADMIN — ROCURONIUM BROMIDE 20 MG: 10 INJECTION INTRAVENOUS at 08:59

## 2020-12-31 RX ADMIN — PHENYLEPHRINE HYDROCHLORIDE 0.5 MCG/KG/MIN: 10 INJECTION INTRAVENOUS at 08:31

## 2020-12-31 RX ADMIN — Medication 2 G: at 07:35

## 2020-12-31 RX ADMIN — Medication 5 MG: at 08:39

## 2020-12-31 RX ADMIN — HYDROMORPHONE HYDROCHLORIDE 0.3 MG: 1 INJECTION, SOLUTION INTRAMUSCULAR; INTRAVENOUS; SUBCUTANEOUS at 10:11

## 2020-12-31 RX ADMIN — PROPOFOL 100 MCG/KG/MIN: 10 INJECTION, EMULSION INTRAVENOUS at 07:35

## 2020-12-31 RX ADMIN — FENTANYL CITRATE 100 MCG: 50 INJECTION, SOLUTION INTRAMUSCULAR; INTRAVENOUS at 07:30

## 2020-12-31 RX ADMIN — LIDOCAINE HYDROCHLORIDE 100 MG: 20 INJECTION, SOLUTION INFILTRATION; PERINEURAL at 07:30

## 2020-12-31 RX ADMIN — DEXAMETHASONE SODIUM PHOSPHATE 6 MG: 4 INJECTION, SOLUTION INTRAMUSCULAR; INTRAVENOUS at 07:50

## 2020-12-31 RX ADMIN — PHENYLEPHRINE HYDROCHLORIDE 100 MCG: 10 INJECTION INTRAVENOUS at 08:11

## 2020-12-31 RX ADMIN — SODIUM CHLORIDE, POTASSIUM CHLORIDE, SODIUM LACTATE AND CALCIUM CHLORIDE: 600; 310; 30; 20 INJECTION, SOLUTION INTRAVENOUS at 07:18

## 2020-12-31 RX ADMIN — PROPOFOL 150 MG: 10 INJECTION, EMULSION INTRAVENOUS at 07:30

## 2020-12-31 RX ADMIN — MIDAZOLAM 2 MG: 1 INJECTION INTRAMUSCULAR; INTRAVENOUS at 07:14

## 2020-12-31 ASSESSMENT — MIFFLIN-ST. JEOR: SCORE: 1609.25

## 2020-12-31 NOTE — PROGRESS NOTES
S: Patient was seen today at [facility] PACU  for an eval for a cervical collar as ordered by Dr. Steward    O/G: The objective/goal of the collar is to help reduce motion/give cervical stability.    A: Pt was fit with a [place] J cervical collar, size 300.  Starting with the anterior section, the correct size and height was chosen that supported the patient in the desired position.  Attention was given to the chin support being sure that the correct height was selected to support the chin.   The posterior section was centered on the patients head .  With the side closure straps extending equally on both sides, the Velcro was secured.  An extra padding set was also provided.  Patient instructed on donning, doffing, use and care    P: Patient has been instructed to contact our facility with any questions and/or concerns.   Carlos OLSEN,ROSAMARIA

## 2020-12-31 NOTE — DISCHARGE INSTRUCTIONS
-Wound Care: Your incision was closed with sutures underneath the skin. If you have a brown/tan rubber-like dressing (called Aquacel) applied to your surgical site, you may shower over this and pat dry afterward. You may remove the dressing 1 week after surgery. If you had a lumbar procedure and your belt line contacts the area of the incision, then place a dry dressing over the incision daily in order to keep it from being rubbed by your pants. Similarly, if you are wearing a cervical collar and your incision is on your neck, you can keep a dry dressing over the incision to keep it from being rubbed. Otherwise, the dressing can be removed and the wound can be left open to air. If you keep your incision covered, change the bandages every other day and keep wound clean and dry.  If the dressing becomes wet for any reason, such as with sweat or water, it should be changed. Showering is allowed if your incision is dry. No scrubbing or submerging your incision in standing water such as a bath x 4 weeks. Steri-strips (small white tape that is directly on the incision areas), if present, should be left on until they fall off or are removed at your first office visit. Do not apply creams or lotions to your wound.     Your wound should remain free of discharge or significant surrounding redness. If you notice any drainage or discharge, or it it develops significant redness, please contact the clinic immediately. After hours or weekends, you may go to the Orthopaedic Surgery walk-in clinic from 7 am to 7 pm daily at the [facility] ([address] [place] [address]).     -Pain control: Take medication as prescribed, but only as often as necessary. You have been prescribed a narcotic pain medication for assistance with pain control. Narcotic pain medications have numerous side effects which can include nausea, constipation, drowsiness, and itching. If you experience nausea, this may be  relieved by taking th e medication with food. To avoid constipation, you should take a stool softener, as well as drink plenty of water and eat fruits and vegetables. Do not drive or drink alcohol while you are taking narcotic pain medication.  As soon as you are able, you should try to wean off the narcotic pain medication. Remember that Tylenol can be used for pain relief as well, as you wean off the narcotics. Do not exceed 4,000 mg of Tylenol in 24 hours. You may not take non-steroidal anti-inflammatory medications (i.e., Advil, Ibuprofen, Aleve) for the first 3 months after surgery as it interferes with bone healing.     -Please ice the affected area as much as possible to reduce swelling. Swelling is one of the most common causes of pain after surgery.    -Activity: No excessive or repetitive bending, twisting or lifting. No lifting >10 lbs x 6 weeks. This means no sporting activities (such as running, tennis, bowling, golf, bicycling, etc.) until you are seen in clinic. We encourage you to be up and out of bed regularly. Please try to walk 30 minutes per day. If you have been prescribed a brace, please wear the brace when up and out of bed.     -When you get home, you may resume your normal diet as tolerated. You may not be very hungry, but try to eat small healthy meals to help you heal. Remember to drink plenty of water/fluids to help keep you hydrated.    -CALL OUR CLINIC ([phone] during regular office hours, or [phone] for the on-call resident MD for questions - RESIDENT DOES NOT HAVE ABILITY TO PRESCRIBE NARCOTICS) IF: Pain in your surgical area persists or worsens in the first few days after surgery. Excessive redness or drainage of cloudy or bloody material from the wounds (clear red tinted fluid and some mild drainage should be expected). Drainage of any kind > one week after surgery should be reported to the doctor. You have a temperature elevation greater than 101.5  You have pain,  swelling or redness in your calf. You have numbness or weakness in your arm, hand, leg or foot.    -Call your primary doctor or seek medical care if you have any of the following: temperature greater than 101.4, chest pain, increased shortness of breath, nausea or vomiting.     Same-Day Surgery   Adult Discharge Orders & Instructions     For 24 hours after surgery:  1. Get plenty of rest.  A responsible adult must stay with you for at least 24 hours after you leave the hospital.   2. Pain medication can slow your reflexes. Do not drive or use heavy equipment.  If you have weakness or tingling, don't drive or use heavy equipment until this feeling goes away.  3. Mixing alcohol and pain medication can cause dizziness and slow your breathing. It can even be fatal. Do not drink alcohol while taking pain medication.  4. Avoid strenuous or risky activities.  Ask for help when climbing stairs.   5. You may feel lightheaded.  If so, sit for a few minutes before standing.  Have someone help you get up.   6. If you have nausea (feel sick to your stomach), drink only clear liquids such as apple juice, ginger ale, broth or 7-Up.  Rest may also help.  Be sure to drink enough fluids.  Move to a regular diet as you feel able. Take pain medications with a small amount of solid food, such as toast or crackers, to avoid nausea.   7. A slight fever is normal. Call the doctor if your fever is over 100 F (37.7 C) (taken under the tongue) or lasts longer than 24 hours.  8. You may have a dry mouth, muscle aches, trouble sleeping or a sore throat.  These symptoms should go away after 24 hours.  9. Do not make important or legal decisions.   Pain Management:      1. Take pain medication (if prescribed) for pain as directed by your physician.        2. WARNING: If the pain medication you have been prescribed contains Tylenol  (acetaminophen), DO NOT take additional doses of Tylenol (acetaminophen).     Call your doctor for any of the  followin.  Signs of infection (fever, growing tenderness at the surgery site, severe pain, a large amount of drainage or bleeding, foul-smelling drainage, redness, swelling).    2.  It has been over 8 to 10 hours since surgery and you are still not able to urinate (pee).    3.  Headache for over 24 hours.    4.  Numbness, tingling or weakness the day after surgery (if you had spinal anesthesia).  To contact a doctor, call _____________________________________ or:      [phone] and ask for the Resident On Call for:          __Orthopedic Surgery_________ (answered 24 hours a day)      Emergency Department:  [place] [facility] [facility]: [phone]  [facility]: [phone]               Rev. 10/2014

## 2020-12-31 NOTE — PROGRESS NOTES
"Post op check: Pt seen in PACU phase II. He is doing well. No issues w airway. Has been up to chair, walked to bathroom, and tolerating PO. Pain is well controlled. Fitted appropriately for [place] J collar.     /67   Pulse 79   Temp 97.5  F (36.4  C) (Oral)   Resp 14   Ht 1.778 m (5' 10\")   Wt 77.8 kg (171 lb 8.3 oz)   SpO2 98%   BMI 24.61 kg/m    I/O this shift:  In: 1272 [P.O.:200; I.V.:1072]  Out: 20 [Blood:20]  Gen: NAD. Resting comfortably in chair  Resp: Breathing comfortably on RA  Musculoskeletal:     Motor Strength Right Left   Deltoids: C5 5/5 5/5   Biceps: C5 5/5 5/5   Brachialis: C6 5/5 5/5   Wrist extension: C6 5/5 5/5   Triceps: C7  5/5 5/5   Wrist flexion: C7 5/5 5/5    strength: C8 5/5 5/5   Hand intrinsics: T1 5/5 5/5     Sensation from C4-T is preserved.      Plan:  Pt doing well post-operatively. He was discussed w Dr. Irizarry and deemed safe for discharge to home.     Sergio Steward MD  Orthopaedic Surgery, PGY-1  Pager: [phone]      "

## 2020-12-31 NOTE — OR NURSING
"Pt easily up to bathroom to void.  Neuro checks stable.  DR Steward here at bedside checking neuro checks.  Pt states \"numbness in fingers better but not all gone\".  All extremities strong.  Vital signs stable.  Meets discharge criteria.  Pt calling wife and states \"I want to go home\".  Tolerates PO and pt able to void.  "

## 2020-12-31 NOTE — ANESTHESIA POSTPROCEDURE EVALUATION
Anesthesia POST Procedure Evaluation    Patient: Pawel Vallejo   MRN:     [id number] Gender:   male   Age:    55 year old :      1965        Preoperative Diagnosis: Cervical radiculopathy [M54.12]   Procedure(s):  Cervical 4 to 6 Anterior Cervical Decompression and Fusion with Medtronic Plate and Screws, Interbody Device, use of Fluoroscopy, Microscope  and Left Sided Iliac Crest Autograft   Postop Comments: No value filed.     Anesthesia Type: General          Postop Pain Control: Uneventful            Sign Out: Well controlled pain   PONV: No   Neuro/Psych: Uneventful            Sign Out: Acceptable/Baseline neuro status   Airway/Respiratory: Uneventful            Sign Out: Acceptable/Baseline resp. status   CV/Hemodynamics: Uneventful            Sign Out: Acceptable CV status   Other NRE: NONE   DID A NON-ROUTINE EVENT OCCUR? No         Last Anesthesia Record Vitals:  CRNA VITALS  2020 0933 - 2020 1033      2020             EKG:  Sinus rhythm;PVC's          Last PACU Vitals:  Vitals Value Taken Time   /79 20 1145   Temp 36.7  C (98  F) 20 1130   Pulse 89 20 1157   Resp 18 20 1156   SpO2 98 % 20 1157   Temp src Esophageal 20 1005   NIBP     Pulse     SpO2     Resp     Temp     Ht Rate     Temp 2     Vitals shown include unvalidated device data.      Electronically Signed By: Lakisha Collado MD, 2020, 12:11 PM

## 2020-12-31 NOTE — ANESTHESIA CARE TRANSFER NOTE
Patient: Pawel Vallejo    Procedure(s):  Cervical 4 to 6 Anterior Cervical Decompression and Fusion with Medtronic Plate and Screws, Interbody Device, use of Fluoroscopy, Microscope  and Left Sided Iliac Crest Autograft    Diagnosis: Cervical radiculopathy [M54.12]  Diagnosis Additional Information: No value filed.    Anesthesia Type:   General     Note:  Airway :Face Mask  Patient transferred to:PACU  Handoff Report: Identifed the Patient, Identified the Reponsible Provider, Reviewed the pertinent medical history, Discussed the surgical course, Reviewed Intra-OP anesthesia mangement and issues during anesthesia, Set expectations for post-procedure period and Allowed opportunity for questions and acknowledgement of understanding      Vitals: (Last set prior to Anesthesia Care Transfer)    CRNA VITALS  12/31/2020 0933 - 12/31/2020 1013      12/31/2020             EKG:  Sinus rhythm;PVC's                Electronically Signed By: NOLVIA Guy CRNA  December 31, 2020  10:13 AM

## 2020-12-31 NOTE — BRIEF OP NOTE
[facility]     Brief Operative Note    Pre-operative diagnosis: Cervical radiculopathy [M54.12]  Post-operative diagnosis Same as pre-operative diagnosis    Procedure: Procedure(s):  Cervical 4 to 6 Anterior Cervical Decompression and Fusion with Medtronic Plate and Screws, Interbody Device, use of Fluoroscopy, Microscope  and Left Sided Iliac Crest Autograft  Surgeon: Surgeon(s) and Role:  Panel 1:     * Pawel Irizarry MD - Primary  Panel 2:     * Pawel Irizarry MD - Primary   Assistant: Sergio Steward MD PGY-1 Orthopaedic surgery  Anesthesia: General   Estimated blood loss: Less than 50 ml  Drains: None  Specimens: * No specimens in log *  Findings:   None.  Complications: None.  Implants:   Implant Name Type Inv. Item Serial No.  Lot No. LRB No. Used Action   GRAFT BONE CRUSH CANC [id number] Bone/Tissue/Biologic GRAFT BONE CRUSH CANC 15ML [id number] MUSCULOSKELETAL MUNGUIA  Left 1 Implanted   IMP END CAP MEDT [id number] Metallic Hardware/[place] IMP END CAP MEDT [id number]  [employer] [id number] N/A 1 Implanted   IMP END CAP MEDT [id number] Metallic Hardware/[place] IMP END CAP MEDT [id number]  [employer] [id number] N/A 1 Implanted   IMP PLATE CERV MEDT ZEVO 37MM 2 LVL [id number] Metallic Hardware/[place] IMP PLATE CERV MEDT ZEVO 37MM 2 LVL [id number]  [employer]  N/A 1 Implanted   IMP SCR MEDT ZEVO 3.5X17MM ST [place] [id number] Metallic Hardware/[place] IMP SCR MEDT ZEVO 3.5X17MM ST [place] [id number]  [employer]  N/A 2 Implanted   19mm screw    [employer]  N/A 2 Implanted   20mm screw    [employer]  N/A 2 Implanted     Sergio Steward MD  Orthopaedic Surgery, PGY-1  Pager: [phone]

## 2020-12-31 NOTE — ANESTHESIA PROCEDURE NOTES
Airway   Date/Time: 12/31/2020 7:32 AM   Patient location during procedure: OR    Staff -   CRNA: Nicole Martinez APRN CRNA  Performed By: CRNA    Consent for Airway   Urgency: elective    Indications and Patient Condition  Indications for airway management: kai-procedural  Induction type:intravenousMask difficulty assessment: 1 - vent by mask    Final Airway Details  Final airway type: endotracheal airway  Successful airway:ETT - single  Endotracheal Airway Details   ETT size (mm): 8.0  Cuffed: yes  Successful intubation technique: video laryngoscopy  Grade View of Cords: 1  Secured at (cm): 24    Post intubation assessment   Placement verified by: capnometry, equal breath sounds and chest rise   Number of attempts at approach: 1  Ease of procedure: easy  Dentition: Intact

## 2020-12-31 NOTE — OP NOTE
DATE OF SURGERY: 12/31/2020    PREOPERATIVE DIAGNOSIS: Cervical radiculopathy              POSTOPERATIVE DIAGNOSIS: Same    PROCEDURES:  1. Anterior cervical discectomy with removal of disc material and osteophytes at C4-5 and C5-6 for decompression of the spinal cord.  2. Bilateral Foraminotomies at C4-5 and C5-6 for decompression of the nerve roots.  3. Placement of intervertebral prosthetic device at C4-5 and C5-6, Medtronic PEEK Cages.  3. Alcalde and Use of Iliac Crest Autograft for fusion, C4-5 and C5-6, which was harvested through a separate skin and fascial incision.  4. Placement of anterior Medtronic plate and screw instrumentation,C4-6, the plate was placed separately from the interbody and was used to aid in fusion rate.  5. Use of local autograft.    Primary Surgeon: Pawel Irizarry MD    FIRST ASSISTANT: SANDRA ChenY1.    ANESTHESIA: General Endotracheal    COMPLICATIONS:  None.    SPECIMENS: None.    ESTIMATED BLOOD LOSS: 20 mL    INDICATIONS:                          Pawel Vallejo is a 55 year old male with debilitating symptoms from Cervical radiculopathy [M54.12].  The patient elected surgical treatment, and understood the indications for this surgery, as well as its risks, benefits, and alternatives. We reviewed the risks and benefits of the surgery in detail. The risks include, but are not limited to, the general risks associated with anesthesia, including death, pulmonary embolism, DVT, stroke, myocardial infarction, pneumonia, and urinary tract infection. Additional risks specific to the surgery include the risk of infection, failure to heal (non-union), dural tear with resultant CSF leak which might necessitate placement of a drain or revision surgery or could result in headaches, nerve injury resulting in weakness or paralysis, risk of adjacent segment disease, the risks of vascular injury resulting in severe or possibly uncontrollable bleeding, need for revision surgery in the  future due to one of the above issues, or risk of incomplete symptom relief.  Pawel Vallejo understands the risks of the surgery and wishes to proceed.  No guarantees were given.    DESCRIPTION OF PROCEDURE:           Pawel Vallejo was taken to the operating room, where the Anesthesiology Service induced satisfactory general anesthesia. Ancef was given IV.  Venous thromboembolic prophylaxis was performed with sequential devices placed on the calves bilaterally.  Valente catheter was placed under standard sterile techniques. A bump was placed under the shoulders the arms were secured.  All pressure points were well padded.  The anterior neck was prepped and draped in its entirety in the usual sterile fashion. We then held a multidisciplinary time out in which we verified the patient, procedure, antibiotics, and operative plan.  All team members were in agreement.    We began by harvesting iliac crest autograft from the left side. I made a 2.5 cm longitudinal incision, dissected down to the crest, breached the dorsal cortex with a rongeur, and then harvested 4cc of cancellous autograft with a curette.  I then irrigated. The area was back filled with allograft and then closed in layers.      I then performed a standard bucio-hart approach to the anterior spine from the patient's Left side.  I used surface landmarks to identify the incision location.  The skin was sharply incised and I then switched to electrocautery to go through the platysma.  Sub-platysmal dissection was bluntly performed 3cm above and below the incision to relax the soft tissues.   I then used a combination of scissor dissection and blunt dissection to carefully dissect the plane between the SCM and the soft tissues medially, down to the spine. Crossing vessels were tied off as we encountered them.  A spinal needle was placed into the most accessible vertebral body, and a fluoroscopic image was obtained to verify the level.  The  surgical disc space was marked with a pen.      I then used electrocautery to elevate the longus coli over the levels of the planned fusion from C4-C6. We then turned our attention to the first level of decompression.      I used cautery to define the disc C5-6 space.  A 15 blade was then used to incise the annulus caudal and cephalad. A micro-curette was then used to remove the bulk of the disc material.  I next placed caspar pins above and below and the distractor was used to open up the disc space while a mota was used to gently provide distraction.  With this increased visualization I then removed the remainder of the posterior disc material down to the level of the PLL.  With the PLL still intact, I next used a bur to remove the posterior osteophytes above the level of the PLL and to thin the uncinates bilaterally.  The bur was then used to remove the anterior lip of the cephalad vertebral body and to produce a square shaped perpendicular surface for graft placement with a good bed for fusion.  I then sized the graft for a planned 7mm height Medtronic PEEK cage.  While this was prepared on the back table, I then used a microcurette to split the PLL and create space between the PLL and dura.  A 2mm kerrison was then used to resect the PLL out to the level of the uncinates and also to remove any remaining posterior osteophytes.  This dissection was carried out into the foramen until the uptake of the root was clearly visible and until a nerve hook could be freely passed out into the foramen bilaterally, thus completing the foraminotomies.  The graft was filled with iliac crest autograft and some local autograft from the bone spurs and bone dust during the discectomy.  The graft was then impacted.      I then moved up to the C4-5 level.  Trimlines and caspars were moved up.  A 15 blade was then used to incise the annulus caudal and cephalad. A micro-curette was then used to remove the bulk of the disc material.  The caspar distractor was used to open up the disc space while a mota was used to gently provide distraction.  With this increased visualization I then removed the remainder of the posterior disc material down to the level of the PLL.  With the PLL still intact, I next used a bur to remove the posterior osteophytes above the level of the PLL and to thin the uncinates bilaterally.  The bur was then used to remove the anterior lip of the cephalad vertebral body and to produce a square shaped perpendicular surface for graft placement with a good bed for fusion.  I then sized the graft for a planned 6mm height Medtronic PEEK cage.  While this was prepared on the back table, I then used a microcurette to split the PLL and create space between the PLL and dura.  A 2mm kerrison was then used to resect the PLL out to the level of the uncinates and also to remove any remaining posterior osteophytes.  This dissection was carried out into the foramen until the uptake of the root was clearly visible and until a nerve hook could be freely passed out into the foramen bilaterally, thus completing the foraminotomies.  The graft was filled with iliac crest autograft and some local autograft from the bone spurs and bone dust during the discectomy.  The graft was then impacted.      I measured an appropriate length plate and the plate was aligned and screw holes were drilled.  The screws were placed under manual power.  The placement of the anterior plate with screw fixation was not placed to anchor the interbody device but rather the anterior plate was placed to aid in a successful fusion.   We took our final fluoroscopic radiographs and I was satisfied with the positioning.      I then thoroughly irrigated.  The wound was closed in layers with 3-0 vicryl for the platysma, 3-0 vicryl for the dermis, and 4-0 monocryl and dermabond for the skin.  The wound was dressed with 4x4 and tegaderm.      Sensory monitoring was stable  throughout.    The patient was extubated and taken to the PACU in stable condition.  There were no immediately evident complications.    IPawel MD, was personally present and scrubbed for the entire procedure.

## 2021-01-01 ENCOUNTER — TELEPHONE (OUTPATIENT)
Dept: OTHER | Facility: CLINIC | Age: 56
End: 2021-01-01

## 2021-01-01 ENCOUNTER — PATIENT OUTREACH (OUTPATIENT)
Dept: CARE COORDINATION | Facility: CLINIC | Age: 56
End: 2021-01-01

## 2021-01-02 NOTE — TELEPHONE ENCOUNTER
Patient status post the following surgery with Dr. Pawel Irizarry on December 31, 2020:    1. Anterior cervical discectomy with removal of disc material and osteophytes at C4-5 and C5-6 for decompression of the spinal cord.  2. Bilateral Foraminotomies at C4-5 and C5-6 for decompression of the nerve roots.    Patient's wife calling in with concern about increased congestion.  Denies difficulty breathing.  Additionally, reports increased difficulty sleeping.  Stable numbness in right long finger since surgery.    Discussed the increased congestion after cervical surgery is common.  Discussed the importance of seeking urgent medical evaluation should difficulty breathing develop.  Additionally discussed that should progressive numbness develop the patient should be seen.  Finally discussed that without in-person evaluation complete diagnosis could not be made.  Patient will remain at home at this time but understands that the emergency department is always open should he have urgent concerns.    CEDRICK Yoder MD  PGY-4, Orthopaedic Surgery

## 2021-01-04 ENCOUNTER — TELEPHONE (OUTPATIENT)
Dept: ORTHOPEDICS | Facility: CLINIC | Age: 56
End: 2021-01-04

## 2021-01-04 NOTE — TELEPHONE ENCOUNTER
RN called and spoke with patient.  Patient will come in to see Dr. Irizarry for residual tingling and numbness on his fingers.    Star Lewis RN         [facility]    Phone Message    May a detailed message be left on voicemail: yes     Reason for Call: Other: pt needs Moise to call back     Action Taken: Message routed to:  [facility] ortho    Travel Screening: Not Applicable    Pt would like to speak w/ Moise regarding if she can use Mucinex after a surgical procedure

## 2021-01-04 NOTE — PROGRESS NOTES
Patient reports having hard time with thick mucous in his throat that he has been having a hard time clearing.  Suggested drinking warm water to help thin the mucous  His ice packs are hard and put too much pressure on his throat  Suggestion putting ice in a bag so can manipulate so not so much pressure  Still having numbness and tingling  Suggestion it may still be inflammation to the area after surgery try the ice to see if that helps

## 2021-01-04 NOTE — PROGRESS NOTES
[facility]: Post-Discharge Note  SITUATION                                                      Admission:    Admission Date: 12/31/20   Reason for Admission: Anterior cervical discectomy with removal of disc material and osteophytes at C4-5 and C5-6 for decompression of the spinal cord.  Discharge:   Discharge Date: 12/31/20  Discharge Diagnosis: Anterior cervical discectomy with removal of disc material and osteophytes at C4-5 and C5-6 for decompression of the spinal cord.    BACKGROUND                                                      Pawel Vallejo is a 55 year old male with debilitating symptoms from Cervical radiculopathy [M54.12].  The patient elected surgical treatment, and understood the indications for this surgery, as well as its risks, benefits, and alternatives. We reviewed the risks and benefits of the surgery in detail. The risks include, but are not limited to, the general risks associated with anesthesia, including death, pulmonary embolism, DVT, stroke, myocardial infarction, pneumonia, and urinary tract infection. Additional risks specific to the surgery include the risk of infection, failure to heal (non-union), dural tear with resultant CSF leak which might necessitate placement of a drain or revision surgery or could result in headaches, nerve injury resulting in weakness or paralysis, risk of adjacent segment disease, the risks of vascular injury resulting in severe or possibly uncontrollable bleeding, need for revision surgery in the future due to one of the above issues, or risk of incomplete symptom relief.  Pawel Vallejo understands the risks of the surgery and wishes to proceed.  No guarantees were given.    ASSESSMENT      Discharge Assessment  Patient reports symptoms are: Improved  Does the patient have all of their medications?: Yes  Does patient know what their new medications are for?: Yes  Does patient have a follow-up appointment scheduled?:  Yes  Does patient have any other questions or concerns?: Yes    Post-op  Did the patient have surgery or a procedure: Yes  Incision: healing  Drainage: No  Bleeding: none  Fever: No  Chills: No  Redness: No  Warmth: No  Swelling: Yes  Incision site pain: Yes  Eating & Drinking: unable to tolerate solid foods  PO Intake: soft foods  Bowel Function: normal  Urinary Status: voiding without complaint/concerns        PLAN                                                      Outpatient Plan:      No future appointments.        Isis Muhammad, CMA

## 2021-01-05 ENCOUNTER — OFFICE VISIT (OUTPATIENT)
Dept: ORTHOPEDICS | Facility: CLINIC | Age: 56
End: 2021-01-05
Payer: COMMERCIAL

## 2021-01-05 DIAGNOSIS — M54.12 CERVICAL RADICULOPATHY: Primary | ICD-10-CM

## 2021-01-05 DIAGNOSIS — M54.12 CERVICAL RADICULOPATHY: ICD-10-CM

## 2021-01-05 PROCEDURE — 99024 POSTOP FOLLOW-UP VISIT: CPT | Performed by: ORTHOPAEDIC SURGERY

## 2021-01-05 RX ORDER — OXYCODONE HYDROCHLORIDE 5 MG/1
5-10 TABLET ORAL EVERY 4 HOURS PRN
Qty: 50 TABLET | Refills: 0 | Status: SHIPPED | OUTPATIENT
Start: 2021-01-05 | End: 2021-04-02

## 2021-01-05 NOTE — PROGRESS NOTES
Spine Surgery Return Clinic Visit      Chief Complaint:   Surgical Followup (5 day pop DOS 12/31/20 Cervical 4 to 6 Anterior Cervical Decompression and Fusion with Medtronic Plate and Screws, Interbody Device, use of Fluoroscopy, Microscope)      Interval HPI:  Symptom Profile Including: location of symptoms, onset, severity, exacerbating/alleviating factors, previous treatments:        Pawel Vallejo is a 55 year old male who Returns today about 1 week status post anterior cervical decompression and fusion.  This was predominantly for left C6 radicular complaints.  He says those were gone as soon as he woke up from surgery.  However he was noticing some numbness and tingling into the middle 3 digits of his left hand which was not present before surgery.  It was only present when he extended or flexed the wrist.  Is also present whenever he puts pressure over the carpal tunnel for example he demonstrates grabbing his wrist and any type of pressure on the volar aspect of the wrist reproduces his symptoms.            Past Medical History:     Past Medical History:   Diagnosis Date     BPH (benign prostatic hyperplasia)      Chronic back pain      Gastroesophageal reflux disease with esophagitis      History of hypertension      HLD (hyperlipidemia)      Pleurisy             Past Surgical History:     Past Surgical History:   Procedure Laterality Date     CHOLECYSTECTOMY       DECOMPRESSION, FUSION CERVICAL ANTERIOR TWO LEVELS, COMBINED Left 12/31/2020    Procedure: Cervical 4 to 6 Anterior Cervical Decompression and Fusion with Medtronic Plate and Screws, Interbody Device, use of Fluoroscopy, Microscope;  Surgeon: Pawel Irizarry MD;  Location: [facility] OR     GRAFT BONE FROM ILIAC CREST Left 12/31/2020    Procedure: and Left Sided Iliac Crest Autograft;  Surgeon: Pawel Irizarry MD;  Location: [facility] OR     HERNIA REPAIR      Diastasis recti/ abdominal     LEFT shoulder arthroscopic rotator cuff  repair, subacromial decompression and bicep tenodesis  2018            Social History:     Social History     Tobacco Use     Smoking status: Former Smoker     Packs/day: 0.25     Years: 30.00     Pack years: 7.50     Quit date: 10/2020     Years since quittin.2     Smokeless tobacco: Never Used     Tobacco comment: quit in . restarted in the past couple of years and then smoked more socially.   Substance Use Topics     Alcohol use: Yes     Comment: socially            Family History:   No family history on file.         Allergies:   No Known Allergies         Medications:     Current Outpatient Medications   Medication     KRILL OIL PO     oxyCODONE (ROXICODONE) 5 MG tablet     SENNA-docusate sodium (SENNA S) 8.6-50 MG tablet     tiZANidine (ZANAFLEX) 4 MG tablet     No current facility-administered medications for this visit.              Review of Systems:   A focused musculoskeletal and neurologic ROS was performed with pertinent positives and negatives noted in the HPI.  Additional systems were also reviewed and are documented at the bottom of the note.         Physical Exam:   Vitals: There were no vitals taken for this visit.  Musculoskeletal, Neurologic, and Spine:     He is neurovascular intact in the upper extremity 5 out of 5 deltoids biceps triceps wrist extension and wrist flexion, but wrist extension flexion on the left side produced severe neurologic symptoms into the left hand.  He has a markedly positive left Tinel sign over the carpal tunnel.  I do note that there is a small scar right over the carpal tunnel from where the median nerve neurologic monitoring lead was placed.         Imaging:          Assessment and Plan:     55 year old male with very positive Tinel's sign over the left carpal tunnel and neurologic symptoms into the middle 3 digits of the left hand whenever he extends the wrist.    This seems very consistent with an acute carpal tunnel.  I am not totally sure why it would  have developed immediately after surgery, but he did have monitoring leads placed over the left carpal tunnel for the neurologic monitoring during his cervical surgery and potentially this has irritated the median nerve.  Given that his symptoms are very positional I recommended he try a wrist brace to try and keep the wrist in neutral.    If things do not improve he will call us within the next week and I will order a Medrol Dosepak.  If they did not improve after that then I would consider referral to our hand surgeons.           Respectfully,  Pawel Irizarry MD  Spine Surgery  [facility]

## 2021-01-05 NOTE — LETTER
1/5/2021       RE: Pawel Vallejo  [address] [place] [address]    Dear Colleague,    Thank you for referring your patient, Pawel Vallejo, to the [facility] [place]. Please see a copy of my visit note below.    Spine Surgery Return Clinic Visit      Chief Complaint:   Surgical Followup (5 day pop DOS 12/31/20 Cervical 4 to 6 Anterior Cervical Decompression and Fusion with Medtronic Plate and Screws, Interbody Device, use of Fluoroscopy, Microscope)      Interval HPI:  Symptom Profile Including: location of symptoms, onset, severity, exacerbating/alleviating factors, previous treatments:        Pawel Vallejo is a 55 year old male who Returns today about 1 week status post anterior cervical decompression and fusion.  This was predominantly for left C6 radicular complaints.  He says those were gone as soon as he woke up from surgery.  However he was noticing some numbness and tingling into the middle 3 digits of his left hand which was not present before surgery.  It was only present when he extended or flexed the wrist.  Is also present whenever he puts pressure over the carpal tunnel for example he demonstrates grabbing his wrist and any type of pressure on the volar aspect of the wrist reproduces his symptoms.            Past Medical History:     Past Medical History:   Diagnosis Date     BPH (benign prostatic hyperplasia)      Chronic back pain      Gastroesophageal reflux disease with esophagitis      History of hypertension      HLD (hyperlipidemia)      Pleurisy             Past Surgical History:     Past Surgical History:   Procedure Laterality Date     CHOLECYSTECTOMY       DECOMPRESSION, FUSION CERVICAL ANTERIOR TWO LEVELS, COMBINED Left 12/31/2020    Procedure: Cervical 4 to 6 Anterior Cervical Decompression and Fusion with Medtronic Plate and Screws, Interbody Device, use of Fluoroscopy, Microscope;  Surgeon: Pawel Irizarry MD;   Location: [facility] OR     GRAFT BONE FROM ILIAC CREST Left 2020    Procedure: and Left Sided Iliac Crest Autograft;  Surgeon: Pawel Irizarry MD;  Location: [facility] OR     HERNIA REPAIR      Diastasis recti/ abdominal     LEFT shoulder arthroscopic rotator cuff repair, subacromial decompression and bicep tenodesis              Social History:     Social History     Tobacco Use     Smoking status: Former Smoker     Packs/day: 0.25     Years: 30.00     Pack years: 7.50     Quit date: 10/2020     Years since quittin.2     Smokeless tobacco: Never Used     Tobacco comment: quit in . restarted in the past couple of years and then smoked more socially.   Substance Use Topics     Alcohol use: Yes     Comment: socially            Family History:   No family history on file.         Allergies:   No Known Allergies         Medications:     Current Outpatient Medications   Medication     KRILL OIL PO     oxyCODONE (ROXICODONE) 5 MG tablet     SENNA-docusate sodium (SENNA S) 8.6-50 MG tablet     tiZANidine (ZANAFLEX) 4 MG tablet     No current facility-administered medications for this visit.              Review of Systems:   A focused musculoskeletal and neurologic ROS was performed with pertinent positives and negatives noted in the HPI.  Additional systems were also reviewed and are documented at the bottom of the note.         Physical Exam:   Vitals: There were no vitals taken for this visit.  Musculoskeletal, Neurologic, and Spine:     He is neurovascular intact in the upper extremity 5 out of 5 deltoids biceps triceps wrist extension and wrist flexion, but wrist extension flexion on the left side produced severe neurologic symptoms into the left hand.  He has a markedly positive left Tinel sign over the carpal tunnel.  I do note that there is a small scar right over the carpal tunnel from where the median nerve neurologic monitoring lead was placed.         Imaging:          Assessment and Plan:     55  year old male with very positive Tinel's sign over the left carpal tunnel and neurologic symptoms into the middle 3 digits of the left hand whenever he extends the wrist.    This seems very consistent with an acute carpal tunnel.  I am not totally sure why it would have developed immediately after surgery, but he did have monitoring leads placed over the left carpal tunnel for the neurologic monitoring during his cervical surgery and potentially this has irritated the median nerve.  Given that his symptoms are very positional I recommended he try a wrist brace to try and keep the wrist in neutral.    If things do not improve he will call us within the next week and I will order a Medrol Dosepak.  If they did not improve after that then I would consider referral to our hand surgeons.     Respectfully,  Pawel Irizarry MD  Spine Surgery  [facility]

## 2021-01-05 NOTE — TELEPHONE ENCOUNTER
PDMP Review       Value Time User    State PDMP site checked  Yes 1/5/2021  4:05 PM Linda Lyle PA-C        Reviewed chart and signed refill for oxycodone.    Linda Lyle PA-C

## 2021-01-11 DIAGNOSIS — M54.12 CERVICAL RADICULOPATHY: Primary | ICD-10-CM

## 2021-01-11 RX ORDER — METHYLPREDNISOLONE 4 MG
TABLET, DOSE PACK ORAL
Qty: 21 TABLET | Refills: 0 | Status: SHIPPED | OUTPATIENT
Start: 2021-01-11 | End: 2021-04-02

## 2021-01-14 PROBLEM — M54.12 CERVICAL RADICULOPATHY: Status: RESOLVED | Noted: 2020-11-09 | Resolved: 2021-01-14

## 2021-01-14 NOTE — PROGRESS NOTES
DISCHARGE SUMMARY    Pawel Vallejo was seen 4 times for evaluation and treatment.  Patient did not return for further treatment and current status is unknown.  Due to short treatment duration, no objective or functional changes were made.  Please see goal flow sheet from episode noted date below and initial evaluation for further information.  Patient is discharged from therapy and therapy episode is resolved as of 01/14/21.      Linked Episodes   Type: Episode: Status: Noted: Resolved: Last update: Updated by:   PHYSICAL THERAPY Neck11/9/2020 Active 11/9/2020 12/2/2020  1:06 PM Rashad Galan, PT      Comments:

## 2021-01-19 ENCOUNTER — TELEPHONE (OUTPATIENT)
Dept: ORTHOPEDICS | Facility: CLINIC | Age: 56
End: 2021-01-19

## 2021-01-19 DIAGNOSIS — M54.12 CERVICAL RADICULOPATHY: Primary | ICD-10-CM

## 2021-01-19 NOTE — TELEPHONE ENCOUNTER
RN called and spoke with patient. Then mycharted patient with Dr. Irizarry's plan. Patient is to obtain cervical MRI and then have a virtual visit with Dr. Irizarry to discuss on going hand symptoms.    Star Lewis RN

## 2021-01-20 ENCOUNTER — ANCILLARY PROCEDURE (OUTPATIENT)
Dept: MRI IMAGING | Facility: CLINIC | Age: 56
End: 2021-01-20
Attending: ORTHOPAEDIC SURGERY
Payer: COMMERCIAL

## 2021-01-20 ENCOUNTER — ANCILLARY PROCEDURE (OUTPATIENT)
Dept: GENERAL RADIOLOGY | Facility: CLINIC | Age: 56
End: 2021-01-20
Attending: ORTHOPAEDIC SURGERY
Payer: COMMERCIAL

## 2021-01-20 ENCOUNTER — OFFICE VISIT (OUTPATIENT)
Dept: NEUROLOGY | Facility: CLINIC | Age: 56
End: 2021-01-20
Payer: COMMERCIAL

## 2021-01-20 DIAGNOSIS — M54.12 CERVICAL RADICULOPATHY: ICD-10-CM

## 2021-01-20 DIAGNOSIS — R20.9 DISTURBANCE OF SKIN SENSATION: Primary | ICD-10-CM

## 2021-01-20 PROCEDURE — 95886 MUSC TEST DONE W/N TEST COMP: CPT | Performed by: PSYCHIATRY & NEUROLOGY

## 2021-01-20 PROCEDURE — 72040 X-RAY EXAM NECK SPINE 2-3 VW: CPT | Mod: GC | Performed by: RADIOLOGY

## 2021-01-20 PROCEDURE — 72141 MRI NECK SPINE W/O DYE: CPT | Performed by: RADIOLOGY

## 2021-01-20 PROCEDURE — 95911 NRV CNDJ TEST 9-10 STUDIES: CPT | Performed by: PSYCHIATRY & NEUROLOGY

## 2021-01-20 NOTE — PROGRESS NOTES
[facility]   EMG Laboratory      Nerve Conduction & EMG Report          Patient:       Pawel Vallejo  Patient ID:    [id number]  Gender:        Male  YOB: 1965  Age:           55 Years 3 Months      History and Examination:  Pawel Vallejo is a 55 year old man who reports paresthesias in the third, fourth, and fifth digits. He also reports lancinating sensory symptoms into the third digit upon gentle stimulation of the skin in the mid-volar wrist. Symptoms developed subsequent to an anterior cervical diskectomy and fusion. Examination demonstrates normal tone, bulk, and strength in the left upper limb. He is referred for further evaluation of these symptoms in particular and for consideration of possible radiculopathy or median neuropathy at the wrist.     Techniques:  Motor conduction studies were done with surface recording electrodes. Sensory conduction studies were performed with surface electrodes, unless indicated otherwise by (n), designating the use of subdermal recording electrodes. Temperature was monitored and recorded throughout the study. Upper extremities were maintained at a temperature of 32 degrees Centigrade or higher.  Lower extremities were maintained at a temperature of 31 degrees Centigrade or higher. EMG was done with a concentric needle electrode.    Results:  Left median and ulnar motor and mixed nerve conduction studies demonstrated moderate attenuation of the ulnar sensory nerve action potential amplitude and were otherwise normal. In particular there was no median conduction slowing at the wrist. Of note, a low amplitude response appeared to be reproducibly present after stimulation of the fifth digit while recording over the median nerve in the wrist; and antidromic study resulted in a less definitive response. Left median and ulnar motor conduction studies demonstrated minimal relative prolongation of the latency to the second lumbrical muscle and were  otherwise normal. Electromyography of the left upper limb was normal. Paraspinal electromyography was somewhat limited as the patient was unable to fully flex to relax the paraspinal muscles, per his provider's recommendations.     Interpretation:  This is a mildly abnormal study, demonstrating electrophysiologic evidence of the followin. Minimal evidence of median neuropathy at the wrist by only one criterion. This should be interpreted with caution and in the clinical context.  2. Non-localizing left ulnar sensory neuropathy. A variant innervation pattern is not excluded.  3. No evidence of left cervical radiculopathy.      Norris Clifford MD        Sensory NCS      Nerve / Sites Rec. Site Onset Peak Ref. NP Amp Ref. PP Amp Dist Finesse Ref. Temp     ms ms ms  V  V  V cm m/s m/s  C   L MEDIAN - Dig II Anti      Wrist Dig II 2.50 3.39  24.9 10.0 38.3 14 56.0 48.0 35.6   L ULNAR - Dig V      Dig V Wrist (uln) 2.24 3.23  4.5 8.0 6.9 12.5 55.8 48.0 32.1      Dig V Wrist (med) 2.29 3.33  2.0  2.0 12 52.4  31.7      Dig V Wrist (med) 2.34 3.54  2.5  1.8 12 51.2  31.5   L ULNAR - Dig V Anti      Wrist Dig V 2.50 3.18  3.6 8.0 6.7 12.5 50.0 48.0 33.3      Wrist (med) Dig V 2.50 3.07  0.98  1.7 12 48.0  32.1   L MED CUT N FORE      Elbow Forearm 2.29 2.86 3.30 7.6  7.8 13.5 58.9  32.2   L MEDIAN - Ulnar - Palmar      Median Wrist 1.51 2.03 2.40 31.1  33.5 8 53.0  32.1      Ulnar Wrist 1.46 2.08 2.40 4.7  7.4 8 54.9  32.4       Motor NCS      Nerve / Sites Rec. Site Lat Ref. Amp Ref. Rel Amp Dist Finesse Ref. Dur. Area Temp.     ms ms mV mV % cm m/s m/s ms %  C   L MEDIAN - APB      Wrist APB 3.44 4.40 10.0 5.0 100 8   8.54 100 33.3      Elbow APB 7.76  10.2  102 24 55.5 48.0 8.85 99.8 33.5   L ULNAR - ADM      Wrist ADM 2.60 3.50 12.6 5.0 100 8   6.30 100 35.2      B.Elbow ADM 6.56  11.7  92.6 21 53.1 48.0 6.61 100 32      A.Elbow ADM 7.92  11.5  91.5 9 66.5 48.0 7.45 97.9 32.1   L MEDIAN - II Lumb      Median II Lumb 3.59  1.6   100 10   8.02 100 32.1      Ulnar Palm Int 3.02  2.9  185 10   7.14 153 32   L ULNAR - FDI      Wrist FDI 3.18  17.4  100    7.50 100 33.6      B.Elbow FDI 7.29  16.8  96.4 21 51.0  7.40 98.4 34.1      A.Elbow FDI 8.85  16.1  92.2 9 57.6  7.76 97.2 34.1       EMG Summary Table     Spontaneous MUAP Recruitment    IA Fib PSW Fasc H.F. Amp Dur. PPP Pattern   L. FIRST D INTEROSS N None None None None N N N N   L. PRON TERES N None None None None N N N N   L. FLEX CARPI ULN N None None None None N N N N   L. FLEX POLL LONG N None None None None N N N N   L. EXT DIG COMM N None None None None N N N N   L. EXT INDICIS N None None None None N N N N   L. ABD POLL BREVIS N None None None None N N N N   L. BICEPS N None None None None N N N N   L. DELTOID N None None None None N N N N   L. TRICEPS N None None None None N N N N   L. C8 PSP N None None None None       L. C7 PSP N None None None None

## 2021-01-20 NOTE — LETTER
1/20/2021       RE: Pawel Vallejo  [address] [facility] [address]     Dear Colleague,    Thank you for referring your patient, Pawel Vallejo, to the [facility] EMG CLIN[facility] [place] at [facility]. Please see a copy of my visit note below.    [facility]   EMG Laboratory      Nerve Conduction & EMG Report          Patient:       Pawel Vallejo  Patient ID:    [id number]  Gender:        Male  YOB: 1965  Age:           55 Years 3 Months      History and Examination:  Pawel Vallejo is a 55 year old man who reports paresthesias in the third, fourth, and fifth digits. He also reports lancinating sensory symptoms into the third digit upon gentle stimulation of the skin in the mid-volar wrist. Symptoms developed subsequent to an anterior cervical diskectomy and fusion. Examination demonstrates normal tone, bulk, and strength in the left upper limb. He is referred for further evaluation of these symptoms in particular and for consideration of possible radiculopathy or median neuropathy at the wrist.     Techniques:  Motor conduction studies were done with surface recording electrodes. Sensory conduction studies were performed with surface electrodes, unless indicated otherwise by (n), designating the use of subdermal recording electrodes. Temperature was monitored and recorded throughout the study. Upper extremities were maintained at a temperature of 32 degrees Centigrade or higher.  Lower extremities were maintained at a temperature of 31 degrees Centigrade or higher. EMG was done with a concentric needle electrode.    Results:  Left median and ulnar motor and mixed nerve conduction studies demonstrated moderate attenuation of the ulnar sensory nerve action potential amplitude and were otherwise normal. In particular there was no median conduction slowing at the wrist. Of note, a low amplitude response appeared to be  reproducibly present after stimulation of the fifth digit while recording over the median nerve in the wrist; and antidromic study resulted in a less definitive response. Left median and ulnar motor conduction studies demonstrated minimal relative prolongation of the latency to the second lumbrical muscle and were otherwise normal. Electromyography of the left upper limb was normal. Paraspinal electromyography was somewhat limited as the patient was unable to fully flex to relax the paraspinal muscles, per his provider's recommendations.     Interpretation:  This is a mildly abnormal study, demonstrating electrophysiologic evidence of the followin. Minimal evidence of median neuropathy at the wrist by only one criterion. This should be interpreted with caution and in the clinical context.  2. Non-localizing left ulnar sensory neuropathy. A variant innervation pattern is not excluded.  3. No evidence of left cervical radiculopathy.      Norris Clifford MD        Sensory NCS      Nerve / Sites Rec. Site Onset Peak Ref. NP Amp Ref. PP Amp Dist Finesse Ref. Temp     ms ms ms  V  V  V cm m/s m/s  C   L MEDIAN - Dig II Anti      Wrist Dig II 2.50 3.39  24.9 10.0 38.3 14 56.0 48.0 35.6   L ULNAR - Dig V      Dig V Wrist (uln) 2.24 3.23  4.5 8.0 6.9 12.5 55.8 48.0 32.1      Dig V Wrist (med) 2.29 3.33  2.0  2.0 12 52.4  31.7      Dig V Wrist (med) 2.34 3.54  2.5  1.8 12 51.2  31.5   L ULNAR - Dig V Anti      Wrist Dig V 2.50 3.18  3.6 8.0 6.7 12.5 50.0 48.0 33.3      Wrist (med) Dig V 2.50 3.07  0.98  1.7 12 48.0  32.1   L MED CUT N FORE      Elbow Forearm 2.29 2.86 3.30 7.6  7.8 13.5 58.9  32.2   L MEDIAN - Ulnar - Palmar      Median Wrist 1.51 2.03 2.40 31.1  33.5 8 53.0  32.1      Ulnar Wrist 1.46 2.08 2.40 4.7  7.4 8 54.9  32.4       Motor NCS      Nerve / Sites Rec. Site Lat Ref. Amp Ref. Rel Amp Dist Finesse Ref. Dur. Area Temp.     ms ms mV mV % cm m/s m/s ms %  C   L MEDIAN - APB      Wrist APB 3.44 4.40 10.0 5.0 100 8    8.54 100 33.3      Elbow APB 7.76  10.2  102 24 55.5 48.0 8.85 99.8 33.5   L ULNAR - ADM      Wrist ADM 2.60 3.50 12.6 5.0 100 8   6.30 100 35.2      B.Elbow ADM 6.56  11.7  92.6 21 53.1 48.0 6.61 100 32      A.Elbow ADM 7.92  11.5  91.5 9 66.5 48.0 7.45 97.9 32.1   L MEDIAN - II Lumb      Median II Lumb 3.59  1.6  100 10   8.02 100 32.1      Ulnar Palm Int 3.02  2.9  185 10   7.14 153 32   L ULNAR - FDI      Wrist FDI 3.18  17.4  100    7.50 100 33.6      B.Elbow FDI 7.29  16.8  96.4 21 51.0  7.40 98.4 34.1      A.Elbow FDI 8.85  16.1  92.2 9 57.6  7.76 97.2 34.1       EMG Summary Table     Spontaneous MUAP Recruitment    IA Fib PSW Fasc H.F. Amp Dur. PPP Pattern   L. FIRST D INTEROSS N None None None None N N N N   L. PRON TERES N None None None None N N N N   L. FLEX CARPI ULN N None None None None N N N N   L. FLEX POLL LONG N None None None None N N N N   L. EXT DIG COMM N None None None None N N N N   L. EXT INDICIS N None None None None N N N N   L. ABD POLL BREVIS N None None None None N N N N   L. BICEPS N None None None None N N N N   L. DELTOID N None None None None N N N N   L. TRICEPS N None None None None N N N N   L. C8 PSP N None None None None       L. C7 PSP N None None None None                              Again, thank you for allowing me to participate in the care of your patient.      Sincerely,    Norris Clifford MD

## 2021-01-21 NOTE — TELEPHONE ENCOUNTER
RECORDS RECEIVED FROM: Discuss abnormal EMG per     DATE RECEIVED: Feb 4, 2021   NOTES STATUS DETAILS   OFFICE NOTE from referring provider Internal    OFFICE NOTE from other specialist N/A    DISCHARGE SUMMARY from hospital N/A    DISCHARGE REPORT from the ER N/A    OPERATIVE REPORT N/A    MEDICATION LIST Internal    IMPLANT RECORD/STICKER N/A    LABS     CBC/DIFF N/A    CULTURES N/A    INJECTIONS DONE IN RADIOLOGY N/A    MRI N/A    CT SCAN N/A    XRAYS (IMAGES & REPORTS) N/A    TUMOR     PATHOLOGY  Slides & report N/A    01/21/21   1:31 PM   Complete  Neelima Lewis, CMA

## 2021-01-28 DIAGNOSIS — M54.12 CERVICAL RADICULOPATHY: Primary | ICD-10-CM

## 2021-02-04 ENCOUNTER — OFFICE VISIT (OUTPATIENT)
Dept: ORTHOPEDICS | Facility: CLINIC | Age: 56
End: 2021-02-04
Payer: COMMERCIAL

## 2021-02-04 ENCOUNTER — PRE VISIT (OUTPATIENT)
Dept: ORTHOPEDICS | Facility: CLINIC | Age: 56
End: 2021-02-04

## 2021-02-04 DIAGNOSIS — R20.8 DYSESTHESIA OF MULTIPLE SITES: Primary | ICD-10-CM

## 2021-02-04 PROCEDURE — 99213 OFFICE O/P EST LOW 20 MIN: CPT | Mod: 24 | Performed by: ORTHOPAEDIC SURGERY

## 2021-02-04 NOTE — NURSING NOTE
Reason For Visit:   Chief Complaint   Patient presents with     Consult     Bilateral Possible CTS refferred by , numbness and tingling in presented after surgery        Primary MD: Melvi Hale  Ref. MD: Dr. Pawel Irizarry     Age: 55 year old    ?  No      There were no vitals taken for this visit.      Pain Assessment  Patient Currently in Pain: No(numbness and tingling)    Hand Dominance Evaluation  Hand Dominance: Right          QuickDASH Assessment  No flowsheet data found.       Current Outpatient Medications   Medication Sig Dispense Refill     KRILL OIL PO Take 1 capsule by mouth daily        methylPREDNISolone (MEDROL DOSEPAK) 4 MG tablet therapy pack Follow Package Directions 21 tablet 0     oxyCODONE (ROXICODONE) 5 MG tablet Take 1-2 tablets (5-10 mg) by mouth every 4 hours as needed for severe pain 50 tablet 0     SENNA-docusate sodium (SENNA S) 8.6-50 MG tablet Take 1 tablet by mouth 2 times daily as needed (For consitpation) 60 tablet 0     tiZANidine (ZANAFLEX) 4 MG tablet Take 1 tablet (4 mg) by mouth 3 times daily 45 tablet 0       No Known Allergies    Bernadette Lynn, ATC

## 2021-02-04 NOTE — LETTER
2/4/2021         RE: Pawel Vallejo  [address] [address]        Dear Colleague,    Thank you for referring your patient, Pawel Vallejo, to the [facility] [place]. Please see a copy of my visit note below.     SARAH, Orthopaedic Surgery Consultation    Pawel Vallejo MRN# [id number]   Age: 55 year old YOB: 1965     Date o Consult:  Feb 5, 2021    Reason for consult: Tingling of fingers       Requesting physician: Gustavo Irizarry         Assessment and Plan:   Assessment:  Complaints of tingling in hand does not appear to be carpal tunnel or cubital tunnel.  Consistent with nerve hypersensitivity just proximal to wrist crease.  No evidence of motor involvement.     Plan:  Lengthy discussion regarding natural history, which would favor resolution of symptoms over 3-6 month time frame.    Recommend OT/CHT for desensitization and nerve protection with silicone gel pads.      RTC prn if symptoms persist.          History of Present Illness:   Pawel Vallejo is a 55 year old male who is referred status post anterior cervical decompression and fusion.  This was predominantly for left C6 radicular complaints.  He says those were gone as soon as he woke up from surgery.  However he was noticing some numbness and tingling into the middle 3 digits of his left hand which was not present before surgery. He also gets this less severe on his left 4th and 5th digits.  He has gotten an EMG/NCV and is here to review the results.          Past Medical History:     Past Medical History:   Diagnosis Date     BPH (benign prostatic hyperplasia)      Chronic back pain      Gastroesophageal reflux disease with esophagitis      History of hypertension      HLD (hyperlipidemia)      Pleurisy              Past Surgical History:     Past Surgical History:   Procedure Laterality Date     CHOLECYSTECTOMY       DECOMPRESSION, FUSION CERVICAL ANTERIOR TWO LEVELS,  COMBINED Left 2020    Procedure: Cervical 4 to 6 Anterior Cervical Decompression and Fusion with Medtronic Plate and Screws, Interbody Device, use of Fluoroscopy, Microscope;  Surgeon: Pawel Irizarry MD;  Location: [facility] OR     GRAFT BONE FROM ILIAC CREST Left 2020    Procedure: and Left Sided Iliac Crest Autograft;  Surgeon: Pawel Irizarry MD;  Location: [facility] OR     HERNIA REPAIR      Diastasis recti/ abdominal     LEFT shoulder arthroscopic rotator cuff repair, subacromial decompression and bicep tenodesis               Social History:     Social History     Socioeconomic History     Marital status:      Spouse name: Not on file     Number of children: Not on file     Years of education: Not on file     Highest education level: Not on file   Occupational History     Not on file   Social Needs     Financial resource strain: Not on file     Food insecurity     Worry: Not on file     Inability: Not on file     Transportation needs     Medical: Not on file     Non-medical: Not on file   Tobacco Use     Smoking status: Former Smoker     Packs/day: 0.25     Years: 30.00     Pack years: 7.50     Quit date: 10/2020     Years since quittin.3     Smokeless tobacco: Never Used     Tobacco comment: quit in . restarted in the past couple of years and then smoked more socially.   Substance and Sexual Activity     Alcohol use: Yes     Comment: socially     Drug use: No     Sexual activity: Yes     Partners: Female   Lifestyle     Physical activity     Days per week: Not on file          Family History:   No family history on file.           Medications:     Current Outpatient Medications   Medication Sig     KRILL OIL PO Take 1 capsule by mouth daily      methylPREDNISolone (MEDROL DOSEPAK) 4 MG tablet therapy pack Follow Package Directions     oxyCODONE (ROXICODONE) 5 MG tablet Take 1-2 tablets (5-10 mg) by mouth every 4 hours as needed for severe pain     SENNA-docusate  sodium (SENNA S) 8.6-50 MG tablet Take 1 tablet by mouth 2 times daily as needed (For consitpation)     tiZANidine (ZANAFLEX) 4 MG tablet Take 1 tablet (4 mg) by mouth 3 times daily     No current facility-administered medications for this visit.              Allergies:    No Known Allergies         Review of Systems:   A comprehensive 10 point review of systems (constitutional, ENT, cardiac, peripheral vascular, respiratory, GI, , Musculoskeletal, skin, Neurological) was performed and found to be negative except as described in this note.           Physical Exam:   COMPLETE EXAMINATION:   VITAL SIGNS: There were no vitals taken for this visit.  Patient was wearing neck brace.  MUSCULOSKELETAL:      Motor exam 5/5 strength of all muscles tested median and ulnar nerves.  Sensory exam 2 point normal  Tinel positive over distal forearm, just proximal to wrist crease, not over carpal tunnel.  Negative carpal compression. Ulnar nerve exam at elbow normal.        Data:     EMG/NCV:   Left median and ulnar motor and mixed nerve conduction studies demonstrated moderate attenuation of the ulnar sensory nerve action potential amplitude and were otherwise normal. In particular there was no median conduction slowing at the wrist. Of note, a low amplitude response appeared to be reproducibly present after stimulation of the fifth digit while recording over the median nerve in the wrist; and antidromic study resulted in a less definitive response. Left median and ulnar motor conduction studies demonstrated minimal relative prolongation of the latency to the second lumbrical muscle and were otherwise normal. Electromyography of the left upper limb was normal. Paraspinal electromyography was somewhat limited as the patient was unable to fully flex to relax the paraspinal muscles, per his provider's recommendations.       Signed:  Carito Corrales MD   Hand and Upper Extremity Specialist  [facility]

## 2021-02-05 NOTE — PROGRESS NOTES
SARAH, Orthopaedic Surgery Consultation    Pawel Vallejo MRN# [id number]   Age: 55 year old YOB: 1965     Date o Consult:  Feb 5, 2021    Reason for consult: Tingling of fingers       Requesting physician: Gustavo Irizarry         Assessment and Plan:   Assessment:  Complaints of tingling in hand does not appear to be carpal tunnel or cubital tunnel.  Consistent with nerve hypersensitivity just proximal to wrist crease.  No evidence of motor involvement.     Plan:  Lengthy discussion regarding natural history, which would favor resolution of symptoms over 3-6 month time frame.    Recommend OT/CHT for desensitization and nerve protection with silicone gel pads.      RTC prn if symptoms persist.          History of Present Illness:   Pawel Vallejo is a 55 year old male who is referred status post anterior cervical decompression and fusion.  This was predominantly for left C6 radicular complaints.  He says those were gone as soon as he woke up from surgery.  However he was noticing some numbness and tingling into the middle 3 digits of his left hand which was not present before surgery. He also gets this less severe on his left 4th and 5th digits.  He has gotten an EMG/NCV and is here to review the results.          Past Medical History:     Past Medical History:   Diagnosis Date     BPH (benign prostatic hyperplasia)      Chronic back pain      Gastroesophageal reflux disease with esophagitis      History of hypertension      HLD (hyperlipidemia)      Pleurisy              Past Surgical History:     Past Surgical History:   Procedure Laterality Date     CHOLECYSTECTOMY       DECOMPRESSION, FUSION CERVICAL ANTERIOR TWO LEVELS, COMBINED Left 12/31/2020    Procedure: Cervical 4 to 6 Anterior Cervical Decompression and Fusion with Medtronic Plate and Screws, Interbody Device, use of Fluoroscopy, Microscope;  Surgeon: Pawel Irizarry MD;  Location: [facility] OR     GRAFT BONE FROM ILIAC CREST  Left 2020    Procedure: and Left Sided Iliac Crest Autograft;  Surgeon: Pawel Irizarry MD;  Location: [facility] OR     HERNIA REPAIR      Diastasis recti/ abdominal     LEFT shoulder arthroscopic rotator cuff repair, subacromial decompression and bicep tenodesis               Social History:     Social History     Socioeconomic History     Marital status:      Spouse name: Not on file     Number of children: Not on file     Years of education: Not on file     Highest education level: Not on file   Occupational History     Not on file   Social Needs     Financial resource strain: Not on file     Food insecurity     Worry: Not on file     Inability: Not on file     Transportation needs     Medical: Not on file     Non-medical: Not on file   Tobacco Use     Smoking status: Former Smoker     Packs/day: 0.25     Years: 30.00     Pack years: 7.50     Quit date: 10/2020     Years since quittin.3     Smokeless tobacco: Never Used     Tobacco comment: quit in . restarted in the past couple of years and then smoked more socially.   Substance and Sexual Activity     Alcohol use: Yes     Comment: socially     Drug use: No     Sexual activity: Yes     Partners: Female   Lifestyle     Physical activity     Days per week: Not on file          Family History:   No family history on file.           Medications:     Current Outpatient Medications   Medication Sig     KRILL OIL PO Take 1 capsule by mouth daily      methylPREDNISolone (MEDROL DOSEPAK) 4 MG tablet therapy pack Follow Package Directions     oxyCODONE (ROXICODONE) 5 MG tablet Take 1-2 tablets (5-10 mg) by mouth every 4 hours as needed for severe pain     SENNA-docusate sodium (SENNA S) 8.6-50 MG tablet Take 1 tablet by mouth 2 times daily as needed (For consitpation)     tiZANidine (ZANAFLEX) 4 MG tablet Take 1 tablet (4 mg) by mouth 3 times daily     No current facility-administered medications for this visit.              Allergies:    No  Known Allergies         Review of Systems:   A comprehensive 10 point review of systems (constitutional, ENT, cardiac, peripheral vascular, respiratory, GI, , Musculoskeletal, skin, Neurological) was performed and found to be negative except as described in this note.           Physical Exam:   COMPLETE EXAMINATION:   VITAL SIGNS: There were no vitals taken for this visit.  Patient was wearing neck brace.  MUSCULOSKELETAL:      Motor exam 5/5 strength of all muscles tested median and ulnar nerves.  Sensory exam 2 point normal  Tinel positive over distal forearm, just proximal to wrist crease, not over carpal tunnel.  Negative carpal compression. Ulnar nerve exam at elbow normal.        Data:     EMG/NCV:   Left median and ulnar motor and mixed nerve conduction studies demonstrated moderate attenuation of the ulnar sensory nerve action potential amplitude and were otherwise normal. In particular there was no median conduction slowing at the wrist. Of note, a low amplitude response appeared to be reproducibly present after stimulation of the fifth digit while recording over the median nerve in the wrist; and antidromic study resulted in a less definitive response. Left median and ulnar motor conduction studies demonstrated minimal relative prolongation of the latency to the second lumbrical muscle and were otherwise normal. Electromyography of the left upper limb was normal. Paraspinal electromyography was somewhat limited as the patient was unable to fully flex to relax the paraspinal muscles, per his provider's recommendations.       Signed:  Carito Corrales MD   Hand and Upper Extremity Specialist  [facility]

## 2021-02-09 ENCOUNTER — OFFICE VISIT (OUTPATIENT)
Dept: ORTHOPEDICS | Facility: CLINIC | Age: 56
End: 2021-02-09
Payer: COMMERCIAL

## 2021-02-09 ENCOUNTER — ANCILLARY PROCEDURE (OUTPATIENT)
Dept: GENERAL RADIOLOGY | Facility: CLINIC | Age: 56
End: 2021-02-09
Attending: ORTHOPAEDIC SURGERY
Payer: COMMERCIAL

## 2021-02-09 VITALS — HEIGHT: 70 IN | BODY MASS INDEX: 24.48 KG/M2 | WEIGHT: 171 LBS

## 2021-02-09 DIAGNOSIS — M54.12 CERVICAL RADICULOPATHY: Primary | ICD-10-CM

## 2021-02-09 PROCEDURE — 99024 POSTOP FOLLOW-UP VISIT: CPT | Performed by: PHYSICIAN ASSISTANT

## 2021-02-09 PROCEDURE — 72040 X-RAY EXAM NECK SPINE 2-3 VW: CPT | Mod: GC | Performed by: RADIOLOGY

## 2021-02-09 ASSESSMENT — MIFFLIN-ST. JEOR: SCORE: 1606.9

## 2021-02-09 NOTE — PROGRESS NOTES
Spine Surgery Return Clinic Visit      Chief Complaint:   RECHECK (6 week follow up DOS 12/31/20 C4-6 ACDF )      Interval HPI:     Pawel Vallejo is a 55 year old male who presents today for f/u on C4-C6 ACDF 12/31/20 by Dr. Irizarry.  His left upper extremity radiculopathy has completely resolved.  He has a little bit of soreness in the back of the neck but this is manageable.  He is wearing a cervical collar.  He developed new left hand numbness which initially started in the tips of the medial 3 digits after surgery, but now is worst in the middle digit with dysthesias in the 4th and 5th digit.  The middle digit can be very painful and the pain is elicited with moving his finger in a certain way or rubbing over the wrist.  He did have neuro monitoring at the time of surgery.  He saw Dr. Corrales for L hand this problem and EMG was performed which was normal. Her impression was that it was not carpal tunnel or cubital fossa syndrome and should resolve with time.  He also had significant dysphagia postop but this is gradually been improving over the last few weeks.  He does occasionally still choke on liquids or dry foods with a cracker but is to be unable to advance his diet.    Gustavo also has a history of chronic low back pain for 15 years.  He underwent an L4-L5 interlaminar injection in December 2020.  Currently his back pain is manageable.           Past Medical History:     Past Medical History:   Diagnosis Date     BPH (benign prostatic hyperplasia)      Chronic back pain      Gastroesophageal reflux disease with esophagitis      History of hypertension      HLD (hyperlipidemia)      Pleurisy             Past Surgical History:     Past Surgical History:   Procedure Laterality Date     CHOLECYSTECTOMY       DECOMPRESSION, FUSION CERVICAL ANTERIOR TWO LEVELS, COMBINED Left 12/31/2020    Procedure: Cervical 4 to 6 Anterior Cervical Decompression and Fusion with Medtronic Plate and Screws, Interbody Device,  "use of Fluoroscopy, Microscope;  Surgeon: Pawel Irizarry MD;  [facility]     GRAFT BONE FROM ILIAC CREST Left 2020    Procedure: and Left Sided Iliac Crest Autograft;  Surgeon: Pawel Irizarry MD;  [facility]: [facility]     HERNIA REPAIR      Diastasis recti/ abdominal     LEFT shoulder arthroscopic rotator cuff repair, subacromial decompression and bicep tenodesis              Social History:     Social History     Tobacco Use     Smoking status: Former Smoker     Packs/day: 0.25     Years: 30.00     Pack years: 7.50     Quit date: 10/2020     Years since quittin.3     Smokeless tobacco: Never Used     Tobacco comment: quit in . restarted in the past couple of years and then smoked more socially.   Substance Use Topics     Alcohol use: Yes     Comment: socially            Family History:   History reviewed. No pertinent family history.         Allergies:   No Known Allergies         Medications:     Current Outpatient Medications   Medication     KRILL OIL PO     methylPREDNISolone (MEDROL DOSEPAK) 4 MG tablet therapy pack     oxyCODONE (ROXICODONE) 5 MG tablet     SENNA-docusate sodium (SENNA S) 8.6-50 MG tablet     tiZANidine (ZANAFLEX) 4 MG tablet     No current facility-administered medications for this visit.             Physical Exam:   Vitals: Ht 1.778 m (5' 10\")   Wt 77.6 kg (171 lb)   BMI 24.54 kg/m    Constitutional: Patient is healthy, well-nourished and appears stated age.  Respiratory: Patient is breathing normally and in no respiratory distress.  Skin: No suspicious rashes or lesions. Well healed left anterior neck incision.   Gait: Non-antalgic gait without use of assistive devices.    Neurologic - Tinel's over left median nerve produces severe pain and also over right median nerve for producing symptoms along 4th/5th digit. Negative over ulnar nerve.   Musculoskeletal: Strength: 5/5 deltoids, 5/5 biceps, 5/5 triceps, 5/5 wrist extensors, 5/5 wrist flexors, " 5/5 interossei, 5/5 .          Imaging:   We independently reviewed and interpreted the following imaging at this clinic visit which were also reviewed with patient    Xrays AP/lat C spine 2/9/2021   Interbody grafts in place at C4-5 and C5-6.  Plate anterior to C4-C6 in good position.  No significant prevertebral swelling.  No evidence of hardware failure.       Assessment:     55 year old male 6 weeks status post C4-C6 ACDF        Plan:     1. Discontinue brace.  He can start lifting 15 to 20 pounds up to waist level but minimal overhead work.  He was advised he can begin golfing in June and should wait for racing and working on his car for 3 more months.  2. We will help him get set up with hand therapy.  We agree with the plan to continue monitoring the hand symptoms and that he should gradually improve over time.  3. Follow-up in 3 months with AP LAT cervical x-rays with Dr. Irizarry.  4. If his low back pain increases he can call and we will refer him for repeat injection.    Plan formulated with Dr. Irizarry who also saw the patient and reviewed imaging. All the patient's questions were answered to their satisfaction.     Cris Gabriel PA-C   Orthopedic Spine Surgery    Attending MD (Dr. Pawel Irizarry) :  I reviewed and verified the history and physical exam of the patient and discussed the patient's management with the other clinical providers involved in this patient's care including any involved residents or physicians assistants. I reviewed the above note and agree with the documented findings and plan of care, which were communicated to the patient.      Pawel Irizarry MD

## 2021-02-09 NOTE — LETTER
2/9/2021         RE: Pawel Vallejo  [address] [id number]        Dear Colleague,    Thank you for referring your patient, Pawel Vallejo, to the [facility] [place]. Please see a copy of my visit note below.    Spine Surgery Return Clinic Visit      Chief Complaint:   RECHECK (6 week follow up DOS 12/31/20 C4-6 ACDF )      Interval HPI:     Pawel Vallejo is a 55 year old male who presents today for f/u on C4-C6 ACDF 12/31/20 by Dr. Irizarry.  His left upper extremity radiculopathy has completely resolved.  He has a little bit of soreness in the back of the neck but this is manageable.  He is wearing a cervical collar.  He developed new left hand numbness which initially started in the tips of the medial 3 digits after surgery, but now is worst in the middle digit with dysthesias in the 4th and 5th digit.  The middle digit can be very painful and the pain is elicited with moving his finger in a certain way or rubbing over the wrist.  He did have neuro monitoring at the time of surgery.  He saw Dr. Corrales for L hand this problem and EMG was performed which was normal. Her impression was that it was not carpal tunnel or cubital fossa syndrome and should resolve with time.  He also had significant dysphagia postop but this is gradually been improving over the last few weeks.  He does occasionally still choke on liquids or dry foods with a cracker but is to be unable to advance his diet.    Gustavo also has a history of chronic low back pain for 15 years.  He underwent an L4-L5 interlaminar injection in December 2020.  Currently his back pain is manageable.           Past Medical History:     Past Medical History:   Diagnosis Date     BPH (benign prostatic hyperplasia)      Chronic back pain      Gastroesophageal reflux disease with esophagitis      History of hypertension      HLD (hyperlipidemia)      Pleurisy             Past Surgical History:     Past  "Surgical History:   Procedure Laterality Date     CHOLECYSTECTOMY       DECOMPRESSION, FUSION CERVICAL ANTERIOR TWO LEVELS, COMBINED Left 2020    Procedure: Cervical 4 to 6 Anterior Cervical Decompression and Fusion with Medtronic Plate and Screws, Interbody Device, use of Fluoroscopy, Microscope;  Surgeon: Pawel Irizarry MD;  [facility]     GRAFT BONE FROM ILIAC CREST Left 2020    Procedure: and Left Sided Iliac Crest Autograft;  Surgeon: Pawel Irizarry MD;  [facility]     HERNIA REPAIR      Diastasis recti/ abdominal     LEFT shoulder arthroscopic rotator cuff repair, subacromial decompression and bicep tenodesis              Social History:     Social History     Tobacco Use     Smoking status: Former Smoker     Packs/day: 0.25     Years: 30.00     Pack years: 7.50     Quit date: 10/2020     Years since quittin.3     Smokeless tobacco: Never Used     Tobacco comment: quit in . restarted in the past couple of years and then smoked more socially.   Substance Use Topics     Alcohol use: Yes     Comment: socially            Family History:   History reviewed. No pertinent family history.         Allergies:   No Known Allergies         Medications:     Current Outpatient Medications   Medication     KRILL OIL PO     methylPREDNISolone (MEDROL DOSEPAK) 4 MG tablet therapy pack     oxyCODONE (ROXICODONE) 5 MG tablet     SENNA-docusate sodium (SENNA S) 8.6-50 MG tablet     tiZANidine (ZANAFLEX) 4 MG tablet     No current facility-administered medications for this visit.             Physical Exam:   Vitals: Ht 1.778 m (5' 10\")   Wt 77.6 kg (171 lb)   BMI 24.54 kg/m    Constitutional: Patient is healthy, well-nourished and appears stated age.  Respiratory: Patient is breathing normally and in no respiratory distress.  Skin: No suspicious rashes or lesions. Well healed left anterior neck incision.   Gait: Non-antalgic gait without use of assistive devices.  "   Neurologic - Tinel's over left median nerve produces severe pain and also over right median nerve for producing symptoms along 4th/5th digit. Negative over ulnar nerve.   Musculoskeletal: Strength: 5/5 deltoids, 5/5 biceps, 5/5 triceps, 5/5 wrist extensors, 5/5 wrist flexors, 5/5 interossei, 5/5 .          Imaging:   We independently reviewed and interpreted the following imaging at this clinic visit which were also reviewed with patient    Xrays AP/lat C spine 2/9/2021   Interbody grafts in place at C4-5 and C5-6.  Plate anterior to C4-C6 in good position.  No significant prevertebral swelling.  No evidence of hardware failure.       Assessment:     55 year old male 6 weeks status post C4-C6 ACDF        Plan:     1. Discontinue brace.  He can start lifting 15 to 20 pounds up to waist level but minimal overhead work.  He was advised he can begin golfing in June and should wait for racing and working on his car for 3 more months.  2. We will help him get set up with hand therapy.  We agree with the plan to continue monitoring the hand symptoms and that he should gradually improve over time.  3. Follow-up in 3 months with AP LAT cervical x-rays with Dr. Irizarry.  4. If his low back pain increases he can call and we will refer him for repeat injection.    Plan formulated with Dr. Irizarry who also saw the patient and reviewed imaging. All the patient's questions were answered to their satisfaction.     Cris Gabriel PA-C   Orthopedic Spine Surgery    Attending MD (Dr. Pawel Irizarry) :  I reviewed and verified the history and physical exam of the patient and discussed the patient's management with the other clinical providers involved in this patient's care including any involved residents or physicians assistants. I reviewed the above note and agree with the documented findings and plan of care, which were communicated to the patient.      Pawel Irizarry MD

## 2021-02-09 NOTE — NURSING NOTE
"Reason For Visit:   Chief Complaint   Patient presents with     RECHECK     6 week follow up DOS 12/31/20 C4-6 ACDF        Primary MD: Melvi Hale  Ref. MD: SSeldell     Date of surgery: Dr. Irizarry   Type of surgery: Dr. Irizarry .  Smoker: No  Request smoking cessation information: No    Ht 1.778 m (5' 10\")   Wt 77.6 kg (171 lb)   BMI 24.54 kg/m           Oswestry ([place]) Questionnaire    No flowsheet data found.         Neck Disability Index (NDI) Questionnaire    Neck Disability Index (NDI) 2/9/2021   Neck Disability Index: Count 7   NDI: Total Score = SUM (points for all 10 findings) 3   Neck Disability in Percent = (Total Score) / 50 * 100 8.57 (%)                   Promis 10 Assessment    PROMIS 10 2/9/2021   In general, would you say your health is: Good   In general, would you say your quality of life is: Good   In general, how would you rate your physical health? Good   In general, how would you rate your mental health, including your mood and your ability to think? Very good   In general, how would you rate your satisfaction with your social activities and relationships? Very good   In general, please rate how well you carry out your usual social activities and roles Very good   To what extent are you able to carry out your everyday physical activities such as walking, climbing stairs, carrying groceries, or moving a chair? Mostly   How often have you been bothered by emotional problems such as feeling anxious, depressed or irritable? Never   How would you rate your fatigue on average? Mild   How would you rate your pain on average?   0 = No Pain  to  10 = Worst Imaginable Pain 1   In general, would you say your health is: 3   In general, would you say your quality of life is: 3   In general, how would you rate your physical health? 3   In general, how would you rate your mental health, including your mood and your ability to think? 4   In general, how would you rate your satisfaction with your social " activities and relationships? 4   In general, please rate how well you carry out your usual social activities and roles. (This includes activities at home, at work and in your community, and responsibilities as a parent, child, spouse, employee, friend, etc.) 4   To what extent are you able to carry out your everyday physical activities such as walking, climbing stairs, carrying groceries, or moving a chair? 4   In the past 7 days, how often have you been bothered by emotional problems such as feeling anxious, depressed, or irritable? 1   In the past 7 days, how would you rate your fatigue on average? 2   In the past 7 days, how would you rate your pain on average, where 0 means no pain, and 10 means worst imaginable pain? 1   Global Mental Health Score 16   Global Physical Health Score 15   PROMIS TOTAL - SUBSCORES 31   Some recent data might be hidden                Carlos Yoo ATC

## 2021-03-31 ENCOUNTER — MYC MEDICAL ADVICE (OUTPATIENT)
Dept: ORTHOPEDICS | Facility: CLINIC | Age: 56
End: 2021-03-31

## 2021-04-01 DIAGNOSIS — M54.12 CERVICAL RADICULOPATHY: Primary | ICD-10-CM

## 2021-04-01 NOTE — TELEPHONE ENCOUNTER
See My  Chart message from pt. That was forwarded yesterday 3-31-21 to .  I called pt to let him know  replied & ordered RTN appt tomorrow in person in clinic for XR & exam.  Pt agreed.    Scheduled.  Call back prn pt agreed. W.O./Belem Vincent RN.

## 2021-04-02 ENCOUNTER — ANCILLARY PROCEDURE (OUTPATIENT)
Dept: GENERAL RADIOLOGY | Facility: CLINIC | Age: 56
End: 2021-04-02
Attending: ORTHOPAEDIC SURGERY
Payer: COMMERCIAL

## 2021-04-02 ENCOUNTER — OFFICE VISIT (OUTPATIENT)
Dept: ORTHOPEDICS | Facility: CLINIC | Age: 56
End: 2021-04-02
Payer: COMMERCIAL

## 2021-04-02 DIAGNOSIS — M54.12 CERVICAL RADICULOPATHY: Primary | ICD-10-CM

## 2021-04-02 DIAGNOSIS — M54.12 CERVICAL RADICULOPATHY: ICD-10-CM

## 2021-04-02 PROCEDURE — 72040 X-RAY EXAM NECK SPINE 2-3 VW: CPT | Mod: GC | Performed by: RADIOLOGY

## 2021-04-02 PROCEDURE — 99213 OFFICE O/P EST LOW 20 MIN: CPT | Performed by: ORTHOPAEDIC SURGERY

## 2021-04-02 RX ORDER — TIZANIDINE HYDROCHLORIDE 4 MG/1
4 CAPSULE, GELATIN COATED ORAL 3 TIMES DAILY
Qty: 90 CAPSULE | Refills: 1 | Status: SHIPPED | OUTPATIENT
Start: 2021-04-02 | End: 2021-04-26

## 2021-04-02 RX ORDER — ACETAMINOPHEN 325 MG/1
325-650 TABLET ORAL EVERY 6 HOURS PRN
Status: ON HOLD | COMMUNITY
End: 2024-05-11

## 2021-04-02 RX ORDER — TAMSULOSIN HYDROCHLORIDE 0.4 MG/1
0.4 CAPSULE ORAL DAILY
COMMUNITY
Start: 2018-01-01

## 2021-04-02 NOTE — NURSING NOTE
Reason For Visit:   Chief Complaint   Patient presents with     RECHECK     Increased Right neck pain.  DOS 12/31/20 Cervical 4 to 6 Anterior Cervical Decompression and Fusion with Medtronic Plate and Screws, Interbody Device        Primary MD: Melvi Hale      ? No    There were no vitals taken for this visit.    Pain Assessment  Patient Currently in Pain: Yes  0-10 Pain Scale: 4  Primary Pain Location: Neck          Neck Disability Index (NDI) Questionnaire    Neck Disability Index (NDI) 2/9/2021   Neck Disability Index: Count 7   NDI: Total Score = SUM (points for all 10 findings) 3   Neck Disability in Percent = (Total Score) / 50 * 100 8.57 (%)              Visual Analog Pain Scale  Neck Pain Scale 0-10: 4  Right arm pain: 0  Left arm pain: 0    Promis 10 Assessment    PROMIS 10 2/9/2021   In general, would you say your health is: Good   In general, would you say your quality of life is: Good   In general, how would you rate your physical health? Good   In general, how would you rate your mental health, including your mood and your ability to think? Very good   In general, how would you rate your satisfaction with your social activities and relationships? Very good   In general, please rate how well you carry out your usual social activities and roles Very good   To what extent are you able to carry out your everyday physical activities such as walking, climbing stairs, carrying groceries, or moving a chair? Mostly   How often have you been bothered by emotional problems such as feeling anxious, depressed or irritable? Never   How would you rate your fatigue on average? Mild   How would you rate your pain on average?   0 = No Pain  to  10 = Worst Imaginable Pain 1   In general, would you say your health is: 3   In general, would you say your quality of life is: 3   In general, how would you rate your physical health? 3   In general, how would you rate your mental health, including your mood and  your ability to think? 4   In general, how would you rate your satisfaction with your social activities and relationships? 4   In general, please rate how well you carry out your usual social activities and roles. (This includes activities at home, at work and in your community, and responsibilities as a parent, child, spouse, employee, friend, etc.) 4   To what extent are you able to carry out your everyday physical activities such as walking, climbing stairs, carrying groceries, or moving a chair? 4   In the past 7 days, how often have you been bothered by emotional problems such as feeling anxious, depressed, or irritable? 1   In the past 7 days, how would you rate your fatigue on average? 2   In the past 7 days, how would you rate your pain on average, where 0 means no pain, and 10 means worst imaginable pain? 1   Global Mental Health Score 16   Global Physical Health Score 15   PROMIS TOTAL - SUBSCORES 31   Some recent data might be hidden                Reba Jc LPN

## 2021-04-02 NOTE — PROGRESS NOTES
Spine Surgery Return Clinic Visit      Chief Complaint:   RECHECK (Increased Right neck pain.  DOS 12/31/20 Cervical 4 to 6 Anterior Cervical Decompression and Fusion with Medtronic Plate and Screws, Interbody Device )      Interval HPI:  Symptom Profile Including: location of symptoms, onset, severity, exacerbating/alleviating factors, previous treatments:        Pawel Vallejo is a 55 year old male who turns today with a recent worsening of right trapezial and periscapular pain as well as some pain in the right paraspinal muscles along his neck.  Seems to be worse at nighttime and wakes him up several times at night he said to take ibuprofen and Tylenol.  It does not radiate down into the arms.  He feels little bit stiff when he tries to range or move his head.  He wanted to come in for an x-ray today to make sure there had been no change in his implants.    Also of note, we have been dealing with some persistent paresthesias in his left hand which we felt were due to the neuro monitoring leads at the time of surgery and this has been almost completely resolved.  He has a little bit of numbness over the long finger on the volar surface but overall it is feeling much better.            Past Medical History:     Past Medical History:   Diagnosis Date     BPH (benign prostatic hyperplasia)      Chronic back pain      Gastroesophageal reflux disease with esophagitis      History of hypertension      HLD (hyperlipidemia)      Pleurisy             Past Surgical History:     Past Surgical History:   Procedure Laterality Date     CHOLECYSTECTOMY       DECOMPRESSION, FUSION CERVICAL ANTERIOR TWO LEVELS, COMBINED Left 12/31/2020    Procedure: Cervical 4 to 6 Anterior Cervical Decompression and Fusion with Medtronic Plate and Screws, Interbody Device, use of Fluoroscopy, Microscope;  Surgeon: Pawel Irizarry MD;  [facility]     GRAFT BONE FROM ILIAC CREST Left 12/31/2020    Procedure: and Left Sided  Iliac Crest Autograft;  Surgeon: Pawel Irizarry MD;  Location: [facility]     HERNIA REPAIR      Diastasis recti/ abdominal     LEFT shoulder arthroscopic rotator cuff repair, subacromial decompression and bicep tenodesis              Social History:     Social History     Tobacco Use     Smoking status: Former Smoker     Packs/day: 0.25     Years: 30.00     Pack years: 7.50     Quit date: 10/2020     Years since quittin.5     Smokeless tobacco: Never Used     Tobacco comment: quit in . restarted in the past couple of years and then smoked more socially.   Substance Use Topics     Alcohol use: Yes     Comment: socially            Family History:   No family history on file.         Allergies:   No Known Allergies         Medications:     Current Outpatient Medications   Medication     acetaminophen (TYLENOL) 325 MG tablet     ibuprofen (ADVIL/MOTRIN) 100 MG tablet     KRILL OIL PO     tamsulosin (FLOMAX) 0.4 MG capsule     No current facility-administered medications for this visit.              Review of Systems:   A focused musculoskeletal and neurologic ROS was performed with pertinent positives and negatives noted in the HPI.  Additional systems were also reviewed and are documented at the bottom of the note.         Physical Exam:   Vitals: There were no vitals taken for this visit.  Musculoskeletal, Neurologic, and Spine:     Cervical spine:    Appearance -no gross step-offs, kyphosis.    Motor -     C5: Deltoids R 5/5 and L 5/5 strength    C6: Biceps R 5/5 and L 5/5 strength     C7: Triceps R 5/5 and L 5/5 strength     C8:  R 5/5 and L 5/5 strength     T1: Dorsal interossei R 5/5 and L 5/5 strength        Sensation: intact to light touch in C5-T1      Special Tests -      Lhermitte's Test - Negative     Spurling's Test - neck stiffness      Da Silva's Test - Negative       Neurologic:      REFLEXES Left Right   Biceps 1+ 1+   Triceps 1+ 1+   Brachioradialis 1+ 1+            Imaging:    We ordered and independently reviewed new radiographs at this clinic visit. The results were discussed with the patient. Findings include:    Cervical radiographs were ordered and reviewed today which show well-positioned implants with no evidence of complication    I also reviewed his previous January cervical MRI which shows no adjacent segment stenosis       Assessment and Plan:     55 year old male with some right neck and periscapular pain.  I think this is muscular in origin.  I would like him to try a muscle relaxant.  I gave a prescription for tizanidine.  I also gave him referral to physical therapy and I would like him to work on periscapular strengthening and stabilizing, while avoiding excessive or painful range of motion of the neck.    If he does not get better with this he will let me know.  I would like him to return the first week of July with a CT scan of his neck to assess the fusion status.  Otherwise he will maintain his current activity restrictions.     Respectfully,  Pawel Irizarry MD  Spine Surgery  [facility]

## 2021-04-02 NOTE — LETTER
4/2/2021         RE: Pawel Vallejo  [address] [facility] [address]        Dear Colleague,    Thank you for referring your patient, Pawel Vallejo, to the [facility] [place]. Please see a copy of my visit note below.    Spine Surgery Return Clinic Visit      Chief Complaint:   RECHECK (Increased Right neck pain.  DOS 12/31/20 Cervical 4 to 6 Anterior Cervical Decompression and Fusion with Medtronic Plate and Screws, Interbody Device )      Interval HPI:  Symptom Profile Including: location of symptoms, onset, severity, exacerbating/alleviating factors, previous treatments:        Pawel Vallejo is a 55 year old male who turns today with a recent worsening of right trapezial and periscapular pain as well as some pain in the right paraspinal muscles along his neck.  Seems to be worse at nighttime and wakes him up several times at night he said to take ibuprofen and Tylenol.  It does not radiate down into the arms.  He feels little bit stiff when he tries to range or move his head.  He wanted to come in for an x-ray today to make sure there had been no change in his implants.    Also of note, we have been dealing with some persistent paresthesias in his left hand which we felt were due to the neuro monitoring leads at the time of surgery and this has been almost completely resolved.  He has a little bit of numbness over the long finger on the volar surface but overall it is feeling much better.            Past Medical History:     Past Medical History:   Diagnosis Date     BPH (benign prostatic hyperplasia)      Chronic back pain      Gastroesophageal reflux disease with esophagitis      History of hypertension      HLD (hyperlipidemia)      Pleurisy             Past Surgical History:     Past Surgical History:   Procedure Laterality Date     CHOLECYSTECTOMY       DECOMPRESSION, FUSION CERVICAL ANTERIOR TWO LEVELS, COMBINED Left 12/31/2020    Procedure: Cervical 4  to 6 Anterior Cervical Decompression and Fusion with Medtronic Plate and Screws, Interbody Device, use of Fluoroscopy, Microscope;  Surgeon: Pawel Irizarry MD;  Location: [facility] OR     GRAFT BONE FROM ILIAC CREST Left 2020    Procedure: and Left Sided Iliac Crest Autograft;  Surgeon: Pawel Irizarry MD;  Location: [facility] OR     HERNIA REPAIR      Diastasis recti/ abdominal     LEFT shoulder arthroscopic rotator cuff repair, subacromial decompression and bicep tenodesis              Social History:     Social History     Tobacco Use     Smoking status: Former Smoker     Packs/day: 0.25     Years: 30.00     Pack years: 7.50     Quit date: 10/2020     Years since quittin.5     Smokeless tobacco: Never Used     Tobacco comment: quit in . restarted in the past couple of years and then smoked more socially.   Substance Use Topics     Alcohol use: Yes     Comment: socially            Family History:   No family history on file.         Allergies:   No Known Allergies         Medications:     Current Outpatient Medications   Medication     acetaminophen (TYLENOL) 325 MG tablet     ibuprofen (ADVIL/MOTRIN) 100 MG tablet     KRILL OIL PO     tamsulosin (FLOMAX) 0.4 MG capsule     No current facility-administered medications for this visit.              Review of Systems:   A focused musculoskeletal and neurologic ROS was performed with pertinent positives and negatives noted in the HPI.  Additional systems were also reviewed and are documented at the bottom of the note.         Physical Exam:   Vitals: There were no vitals taken for this visit.  Musculoskeletal, Neurologic, and Spine:     Cervical spine:    Appearance -no gross step-offs, kyphosis.    Motor -     C5: Deltoids R 5/5 and L 5/5 strength    C6: Biceps R 5/5 and L 5/5 strength     C7: Triceps R 5/5 and L 5/5 strength     C8:  R 5/5 and L 5/5 strength     T1: Dorsal interossei R 5/5 and L 5/5 strength        Sensation: intact  to light touch in C5-T1      Special Tests -      Lhermitte's Test - Negative     Spurling's Test - neck stiffness      Da Silva's Test - Negative       Neurologic:      REFLEXES Left Right   Biceps 1+ 1+   Triceps 1+ 1+   Brachioradialis 1+ 1+            Imaging:   We ordered and independently reviewed new radiographs at this clinic visit. The results were discussed with the patient. Findings include:    Cervical radiographs were ordered and reviewed today which show well-positioned implants with no evidence of complication    I also reviewed his previous January cervical MRI which shows no adjacent segment stenosis       Assessment and Plan:     55 year old male with some right neck and periscapular pain.  I think this is muscular in origin.  I would like him to try a muscle relaxant.  I gave a prescription for tizanidine.  I also gave him referral to physical therapy and I would like him to work on periscapular strengthening and stabilizing, while avoiding excessive or painful range of motion of the neck.    If he does not get better with this he will let me know.  I would like him to return the first week of July with a CT scan of his neck to assess the fusion status.  Otherwise he will maintain his current activity restrictions.     Respectfully,  Pawel Irizarry MD  Spine Surgery  [facility]

## 2021-04-05 ENCOUNTER — TELEPHONE (OUTPATIENT)
Dept: ORTHOPEDICS | Facility: CLINIC | Age: 56
End: 2021-04-05

## 2021-04-05 DIAGNOSIS — M54.50 LUMBAR PAIN: ICD-10-CM

## 2021-04-05 DIAGNOSIS — M54.12 CERVICAL RADICULOPATHY: Primary | ICD-10-CM

## 2021-04-05 NOTE — TELEPHONE ENCOUNTER
The tablets are the preferred form of the medication.  Would the provider be willing to switch to tizanidine 4mg TABLETS.  If provider is okay with switching please provide pharmacy new script.  If provider wants the capsules please provide a letter of medical necessity.

## 2021-04-05 NOTE — TELEPHONE ENCOUNTER
Prior Authorization Retail Medication Request    Medication/Dose: Tizanidine 4mg Capsule  ICD code (if different than what is on RX):    Previously Tried and Failed: Oxycodone/ Step down to non narcotic  Rationale: A step down to non narcotic to manage muscle spasm with Tizanidine.    Insurance Name:  [employer]  Insurance ID:       Pharmacy Information (if different than what is on RX)  Name:  [facility]  Phone:  [phone]

## 2021-04-21 ENCOUNTER — THERAPY VISIT (OUTPATIENT)
Dept: PHYSICAL THERAPY | Facility: CLINIC | Age: 56
End: 2021-04-21
Attending: ORTHOPAEDIC SURGERY
Payer: COMMERCIAL

## 2021-04-21 DIAGNOSIS — Z98.1 S/P CERVICAL SPINAL FUSION: Primary | ICD-10-CM

## 2021-04-21 DIAGNOSIS — M54.12 CERVICAL RADICULOPATHY: ICD-10-CM

## 2021-04-21 PROCEDURE — 97112 NEUROMUSCULAR REEDUCATION: CPT | Mod: GP | Performed by: PHYSICAL THERAPIST

## 2021-04-21 PROCEDURE — 97110 THERAPEUTIC EXERCISES: CPT | Mod: GP | Performed by: PHYSICAL THERAPIST

## 2021-04-21 PROCEDURE — 97162 PT EVAL MOD COMPLEX 30 MIN: CPT | Mod: GP | Performed by: PHYSICAL THERAPIST

## 2021-04-21 NOTE — PROGRESS NOTES
"[facility] Initial Evaluation -- Cervical    Evaluation Date: April 21, 2021  Pawel Vallejo is a 55 year old male with a cervical condition.   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  Pain quality (sharp/shooting/stabbing/aching/burning/cramping):   aching.  Paresthesia (yes/no):  Yes L hand, improving    Present since (onset date): December 31, 2020     Symptoms (improving/unchanging/worsening):  improving    Symptoms commenced as a result of: chronic, underwent surgery for non responsive to conservative management   Condition occurred in the following environment:  home    Symptoms at onset (neck/arm/forearm/headache): neck/shoulder  Constant symptoms (neck/arm/forearm/headache): neck \"tension\"  Intermittent symptoms (neck/arm/forearm/headache): R shoulder    Symptoms are made worse with the following: time of day - Always AM   Symptoms are made better with the following: time of day - Sometimes as the day progresses and meds    Disturbed sleep (yes/no): yes  Number of pillows: 1-2  Sleeping postures (prone/sup/side R/L): sides    Previous episodes (0/1-5/6-10/11+): 1 Year of first episode:     Previous history: underwent surgery for cervical radic  Previous treatments: surgery    Specific Questions: (as reported by the patient)  Dizziness/Tinnitus/Nausea/Swallowing (pos/neg): neg  Gait/Upper Limbs (normal/abnormal): normal  Medications (nil/NSAIDS/anlag/steroids/anticoag/other):  Narcotics/Opiods, Muscle relaxants and Steroids  Medical allergies:  none  General health (excellent/good/fair/poor):  good  Pertinent medical history:  None  Imaging (None/Xray/MRI/Other):    Recent or major " "surgery (yes/no): no  Night pain (yes/no): no  Accidents (yes/no): no  Unexplained weight loss (yes/no): none  Barriers at home: none  Other red flags: none    EXAMINATION    Posture:   Sitting (good/fair/poor): fair to poor  Standing (good/fair/poor): fair     Protruded head (yes/no): no    Wry Neck (right/left/nil):  nil  Relevant (yes/no):   non     Correction of posture(better/worse/no effect): no effect  Other observations:      Neurological:    Motor Deficit:  intact   Reflexes:  Not tested  Sensory Deficit:  Intact except L middle finger decr sensation     Dural signs:  Not tested    Movement Loss:   Jann Mod Min Nil Pain   Protrusion    x    Flexion   x     Retraction   x     Extension  x x  pdm   Lateral flexion R  x   End range \"tightness\"   Lateral flexion L  x   End range \"tightness\"   Rotation R  x   End range \"tightness\"   Rotation L  x   End range \"tightness\"     Test Movements:   During: produces, abolishes, increases, decreases, no effect, centralizing, peripheralizing  After: better, worse, no better, no worse, no effect, centralized, peripheralized    Pretest symptoms sitting: R upper trap/neck   Symptoms During Symptoms After ROM increased ROM decreased No Effect   PRO        Rep PRO        RET No Effect    No Effect         Rep RET No Effect    No Effect      x   RET EXT        Rep RET EXT          Pretest symptoms lying:     Symptoms During Symptoms After ROM increased ROM decreased No Effect   RET No Effect    No Effect         Rep RET No Effect    No Effect     x    RET EXT        Rep RET EXT          If required, pretest symptoms sitting:      Symptoms During Symptoms After ROM increased ROM decreased No Effect   LF-R        Rep LF-R        LF-L        Rep LF-L        ROT-R        Rep ROT-R        ROT-L        Rep ROT-L        FLEX        Rep FLEX            Static Tests:   Protrusion:    Flexion:    Retraction:    Extension (sitting/prone/supine):      Other Tests: AROM: decr R scap control " during elevation/abduction w/early take off.  Shoulder MMT: scap stabs grossly 3+/5, L ER=4/5; incr hypertonicity bilat UT    Provisional Classification:  Inconclusive/Other - Post-Surgery    Principle of Management:  Education:  Posture, therapeutic dose of exercise      Equipment provided:  Suggested lumbar roll  Mechanical therapy (Y/N):  N   Extension principle:     Lateral principle:    Flexion principle:     Other:  Shoulder/scap strengthening    ASSESSMENT/PLAN:    Patient is a 55 year old male with cervical complaints.    Patient has the following significant findings with corresponding treatment plan.                Diagnosis 1:  S/p cervical fusion  Pain -  manual therapy, self management, education, directional preference exercise and home program  Decreased ROM/flexibility - manual therapy and therapeutic exercise  Decreased joint mobility - manual therapy and therapeutic exercise  Decreased strength - therapeutic exercise and therapeutic activities  Impaired muscle performance - neuro re-education  Decreased function - therapeutic activities    Therapy Evaluation Codes:   1) History comprised of:   Personal factors that impact the plan of care:      None.    Comorbidity factors that impact the plan of care are:      None.     Medications impacting care: Muscle relaxant, Pain and Steroids.  2) Examination of Body Systems comprised of:   Body structures and functions that impact the plan of care:      Cervical spine.   Activity limitations that impact the plan of care are:      Driving, Reading/Computer work, Sitting and Sleeping.  3) Clinical presentation characteristics are:   Evolving/Changing.  4) Decision-Making    Moderate complexity using standardized patient assessment instrument and/or measureable assessment of functional outcome.  Cumulative Therapy Evaluation is: Moderate complexity.    Previous and current functional limitations:  (See Goal Flow Sheet for this information)    Short term and  Long term goals: (See Goal Flow Sheet for this information)     Communication ability:  Patient appears to be able to clearly communicate and understand verbal and written communication and follow directions correctly.  Treatment Explanation - The following has been discussed with the patient:   RX ordered/plan of care  Anticipated outcomes  Possible risks and side effects  This patient would benefit from PT intervention to resume normal activities.   Rehab potential is good.    Frequency:  1 X week, once daily  Duration:  for 4 weeks  Discharge Plan:  Achieve all LTG.  Independent in home treatment program.  Reach maximal therapeutic benefit.    Please refer to the daily flowsheet for treatment today, total treatment time and time spent performing 1:1 timed codes.

## 2021-04-24 DIAGNOSIS — M54.12 CERVICAL RADICULOPATHY: ICD-10-CM

## 2021-04-26 RX ORDER — TIZANIDINE HYDROCHLORIDE 4 MG/1
4 CAPSULE, GELATIN COATED ORAL 3 TIMES DAILY
Qty: 90 CAPSULE | Refills: 1 | Status: SHIPPED | OUTPATIENT
Start: 2021-04-26 | End: 2021-05-16

## 2021-05-05 DIAGNOSIS — M54.12 CERVICAL RADICULOPATHY: ICD-10-CM

## 2021-05-09 ENCOUNTER — TRANSFERRED RECORDS (OUTPATIENT)
Dept: HEALTH INFORMATION MANAGEMENT | Facility: CLINIC | Age: 56
End: 2021-05-09

## 2021-05-12 ENCOUNTER — APPOINTMENT (OUTPATIENT)
Dept: GENERAL RADIOLOGY | Facility: CLINIC | Age: 56
End: 2021-05-12
Attending: PHYSICIAN ASSISTANT
Payer: COMMERCIAL

## 2021-05-12 ENCOUNTER — HOSPITAL ENCOUNTER (EMERGENCY)
Facility: CLINIC | Age: 56
Discharge: HOME | End: 2021-05-12
Attending: PHYSICIAN ASSISTANT | Admitting: PHYSICIAN ASSISTANT
Payer: COMMERCIAL

## 2021-05-12 VITALS
HEART RATE: 101 BPM | SYSTOLIC BLOOD PRESSURE: 116 MMHG | RESPIRATION RATE: 18 BRPM | TEMPERATURE: 99.2 F | OXYGEN SATURATION: 93 % | DIASTOLIC BLOOD PRESSURE: 85 MMHG

## 2021-05-12 DIAGNOSIS — U07.1 2019 NOVEL CORONAVIRUS DISEASE (COVID-19): ICD-10-CM

## 2021-05-12 DIAGNOSIS — Z20.822 SUSPECTED COVID-19 VIRUS INFECTION: ICD-10-CM

## 2021-05-12 LAB
ALBUMIN SERPL-MCNC: 3.4 G/DL (ref 3.4–5)
ALP SERPL-CCNC: 84 U/L (ref 40–150)
ALT SERPL W P-5'-P-CCNC: 36 U/L (ref 0–70)
ANION GAP SERPL CALCULATED.3IONS-SCNC: 6 MMOL/L (ref 3–14)
AST SERPL W P-5'-P-CCNC: 25 U/L (ref 0–45)
BASE EXCESS BLDV CALC-SCNC: 2.8 MMOL/L
BASOPHILS # BLD AUTO: 0 10E9/L (ref 0–0.2)
BASOPHILS NFR BLD AUTO: 0.2 %
BILIRUB SERPL-MCNC: 0.5 MG/DL (ref 0.2–1.3)
BUN SERPL-MCNC: 9 MG/DL (ref 7–30)
CALCIUM SERPL-MCNC: 8.3 MG/DL (ref 8.5–10.1)
CHLORIDE SERPL-SCNC: 103 MMOL/L (ref 94–109)
CO2 SERPL-SCNC: 23 MMOL/L (ref 20–32)
CREAT SERPL-MCNC: 0.88 MG/DL (ref 0.66–1.25)
DIFFERENTIAL METHOD BLD: NORMAL
EOSINOPHIL # BLD AUTO: 0 10E9/L (ref 0–0.7)
EOSINOPHIL NFR BLD AUTO: 0 %
ERYTHROCYTE [DISTWIDTH] IN BLOOD BY AUTOMATED COUNT: 12.7 % (ref 10–15)
GFR SERPL CREATININE-BSD FRML MDRD: >90 ML/MIN/{1.73_M2}
GLUCOSE SERPL-MCNC: 90 MG/DL (ref 70–99)
HCO3 BLDV-SCNC: 24 MMOL/L (ref 21–28)
HCT VFR BLD AUTO: 43.8 % (ref 40–53)
HGB BLD-MCNC: 14.6 G/DL (ref 13.3–17.7)
IMM GRANULOCYTES # BLD: 0 10E9/L (ref 0–0.4)
IMM GRANULOCYTES NFR BLD: 0.2 %
LACTATE BLD-SCNC: 0.7 MMOL/L (ref 0.7–2)
LYMPHOCYTES # BLD AUTO: 0.8 10E9/L (ref 0.8–5.3)
LYMPHOCYTES NFR BLD AUTO: 12.7 %
MCH RBC QN AUTO: 30.4 PG (ref 26.5–33)
MCHC RBC AUTO-ENTMCNC: 33.3 G/DL (ref 31.5–36.5)
MCV RBC AUTO: 91 FL (ref 78–100)
MONOCYTES # BLD AUTO: 0.5 10E9/L (ref 0–1.3)
MONOCYTES NFR BLD AUTO: 7.2 %
NEUTROPHILS # BLD AUTO: 5.2 10E9/L (ref 1.6–8.3)
NEUTROPHILS NFR BLD AUTO: 79.7 %
NRBC # BLD AUTO: 0 10*3/UL
NRBC BLD AUTO-RTO: 0 /100
O2/TOTAL GAS SETTING VFR VENT: ABNORMAL %
PCO2 BLDV: 28 MM HG (ref 40–50)
PH BLDV: 7.54 PH (ref 7.32–7.43)
PLATELET # BLD AUTO: 179 10E9/L (ref 150–450)
PO2 BLDV: 46 MM HG (ref 25–47)
POTASSIUM SERPL-SCNC: 3.9 MMOL/L (ref 3.4–5.3)
PROT SERPL-MCNC: 7.2 G/DL (ref 6.8–8.8)
RBC # BLD AUTO: 4.8 10E12/L (ref 4.4–5.9)
SODIUM SERPL-SCNC: 132 MMOL/L (ref 133–144)
TROPONIN I SERPL-MCNC: <0.015 UG/L (ref 0–0.04)
WBC # BLD AUTO: 6.5 10E9/L (ref 4–11)

## 2021-05-12 PROCEDURE — 84484 ASSAY OF TROPONIN QUANT: CPT | Performed by: PHYSICIAN ASSISTANT

## 2021-05-12 PROCEDURE — 93005 ELECTROCARDIOGRAM TRACING: CPT

## 2021-05-12 PROCEDURE — 85025 COMPLETE CBC W/AUTO DIFF WBC: CPT | Performed by: PHYSICIAN ASSISTANT

## 2021-05-12 PROCEDURE — 96361 HYDRATE IV INFUSION ADD-ON: CPT

## 2021-05-12 PROCEDURE — 99285 EMERGENCY DEPT VISIT HI MDM: CPT | Mod: 25

## 2021-05-12 PROCEDURE — 82803 BLOOD GASES ANY COMBINATION: CPT | Performed by: PHYSICIAN ASSISTANT

## 2021-05-12 PROCEDURE — 80053 COMPREHEN METABOLIC PANEL: CPT | Performed by: PHYSICIAN ASSISTANT

## 2021-05-12 PROCEDURE — 250N000011 HC RX IP 250 OP 636: Performed by: PHYSICIAN ASSISTANT

## 2021-05-12 PROCEDURE — 96374 THER/PROPH/DIAG INJ IV PUSH: CPT

## 2021-05-12 PROCEDURE — 258N000003 HC RX IP 258 OP 636: Performed by: PHYSICIAN ASSISTANT

## 2021-05-12 PROCEDURE — 250N000013 HC RX MED GY IP 250 OP 250 PS 637: Performed by: PHYSICIAN ASSISTANT

## 2021-05-12 PROCEDURE — 71045 X-RAY EXAM CHEST 1 VIEW: CPT

## 2021-05-12 PROCEDURE — 96375 TX/PRO/DX INJ NEW DRUG ADDON: CPT

## 2021-05-12 PROCEDURE — 250N000013 HC RX MED GY IP 250 OP 250 PS 637: Performed by: EMERGENCY MEDICINE

## 2021-05-12 PROCEDURE — 83605 ASSAY OF LACTIC ACID: CPT | Performed by: PHYSICIAN ASSISTANT

## 2021-05-12 RX ORDER — IBUPROFEN 200 MG
400 TABLET ORAL ONCE
Status: COMPLETED | OUTPATIENT
Start: 2021-05-12 | End: 2021-05-12

## 2021-05-12 RX ORDER — DEXAMETHASONE SODIUM PHOSPHATE 10 MG/ML
10 INJECTION, SOLUTION INTRAMUSCULAR; INTRAVENOUS ONCE
Status: COMPLETED | OUTPATIENT
Start: 2021-05-12 | End: 2021-05-12

## 2021-05-12 RX ORDER — DIPHENHYDRAMINE HCL 50 MG
50 CAPSULE ORAL 4 TIMES DAILY PRN
Qty: 20 CAPSULE | Refills: 0 | Status: ON HOLD | OUTPATIENT
Start: 2021-05-12 | End: 2021-05-27

## 2021-05-12 RX ORDER — PREDNISONE 20 MG/1
TABLET ORAL
Qty: 10 TABLET | Refills: 0 | Status: ON HOLD | OUTPATIENT
Start: 2021-05-12 | End: 2021-05-27

## 2021-05-12 RX ORDER — METOCLOPRAMIDE 10 MG/1
10 TABLET ORAL 4 TIMES DAILY PRN
Qty: 20 TABLET | Refills: 0 | Status: ON HOLD | OUTPATIENT
Start: 2021-05-12 | End: 2021-05-27

## 2021-05-12 RX ORDER — SODIUM CHLORIDE 9 MG/ML
INJECTION, SOLUTION INTRAVENOUS CONTINUOUS
Status: DISCONTINUED | OUTPATIENT
Start: 2021-05-12 | End: 2021-05-13 | Stop reason: HOSPADM

## 2021-05-12 RX ORDER — KETOROLAC TROMETHAMINE 30 MG/ML
30 INJECTION, SOLUTION INTRAMUSCULAR; INTRAVENOUS ONCE
Status: DISCONTINUED | OUTPATIENT
Start: 2021-05-12 | End: 2021-05-13 | Stop reason: HOSPADM

## 2021-05-12 RX ORDER — METOCLOPRAMIDE HYDROCHLORIDE 5 MG/ML
10 INJECTION INTRAMUSCULAR; INTRAVENOUS ONCE
Status: COMPLETED | OUTPATIENT
Start: 2021-05-12 | End: 2021-05-12

## 2021-05-12 RX ORDER — DIPHENHYDRAMINE HYDROCHLORIDE 50 MG/ML
25 INJECTION INTRAMUSCULAR; INTRAVENOUS ONCE
Status: COMPLETED | OUTPATIENT
Start: 2021-05-12 | End: 2021-05-12

## 2021-05-12 RX ORDER — ONDANSETRON 2 MG/ML
4 INJECTION INTRAMUSCULAR; INTRAVENOUS EVERY 30 MIN PRN
Status: DISCONTINUED | OUTPATIENT
Start: 2021-05-12 | End: 2021-05-13 | Stop reason: HOSPADM

## 2021-05-12 RX ORDER — ACETAMINOPHEN 325 MG/1
975 TABLET ORAL ONCE
Status: COMPLETED | OUTPATIENT
Start: 2021-05-12 | End: 2021-05-12

## 2021-05-12 RX ADMIN — IBUPROFEN 400 MG: 200 TABLET, FILM COATED ORAL at 20:01

## 2021-05-12 RX ADMIN — METOCLOPRAMIDE HYDROCHLORIDE 10 MG: 5 INJECTION INTRAMUSCULAR; INTRAVENOUS at 21:17

## 2021-05-12 RX ADMIN — DIPHENHYDRAMINE HYDROCHLORIDE 25 MG: 50 INJECTION INTRAMUSCULAR; INTRAVENOUS at 21:11

## 2021-05-12 RX ADMIN — ONDANSETRON 4 MG: 2 INJECTION INTRAMUSCULAR; INTRAVENOUS at 20:02

## 2021-05-12 RX ADMIN — SODIUM CHLORIDE 1000 ML: 9 INJECTION, SOLUTION INTRAVENOUS at 20:01

## 2021-05-12 RX ADMIN — ACETAMINOPHEN 975 MG: 325 TABLET, FILM COATED ORAL at 18:39

## 2021-05-12 RX ADMIN — DEXAMETHASONE SODIUM PHOSPHATE 10 MG: 10 INJECTION, SOLUTION INTRAMUSCULAR; INTRAVENOUS at 21:11

## 2021-05-12 ASSESSMENT — ENCOUNTER SYMPTOMS
SHORTNESS OF BREATH: 1
DIARRHEA: 0
VOMITING: 1
FATIGUE: 1
RHINORRHEA: 1
WHEEZING: 0
FEVER: 0
COUGH: 1

## 2021-05-12 NOTE — ED TRIAGE NOTES
A&O x4.  ABC's intact.      Pt arrives with c/o COVID since Saturday and looking for some relief. Pt reports wife got a large dose of Amoxicillin that seemed to help her.

## 2021-05-13 ENCOUNTER — PATIENT OUTREACH (OUTPATIENT)
Dept: CARE COORDINATION | Facility: CLINIC | Age: 56
End: 2021-05-13

## 2021-05-13 LAB — INTERPRETATION ECG - MUSE: NORMAL

## 2021-05-13 NOTE — ED PROVIDER NOTES
History     Chief Complaint:  Covid Concern    HPI  Pawel Vallejo is a 55 year old male with a history of anxiety who presents for evaluation of COVID. The patient was diagnosed with COVID 5/8 and is struggling with fatigue, headache, body aches, metallic taste in his mouth, coughing and shortness of breath. He presents here mainly for symptomatic care. He notes having no hypoxemia on his home pulse ox or increased effort of breathing.     Review of Systems   Constitutional: Positive for fatigue. Negative for fever.   HENT: Positive for congestion and rhinorrhea.    Respiratory: Positive for cough and shortness of breath. Negative for wheezing.    Gastrointestinal: Positive for vomiting. Negative for diarrhea.   All other systems reviewed and are negative.    Allergies:  The patient has no known allergies.     Medications:    Flomax  Zanaflex     Past Medical History:    Anxiety  Esophageal reflux   Adenomatous polyp of colon  Cervical spondylosis  Complete tear of left rotator cuff  Diastasis recti  Hyperlipidemia  Umbilical hernia   BPH  Pleurisy     Past Surgical History:    Cholecystectomy  Hernia repair  Left rotator cuff repair  Subacromial decompression and bicep tenodesis  Decompression, fusion cervical anterior two levels  Graft bone from iliac crest left    Social History:  The patient arrives alone      Physical Exam     Patient Vitals for the past 24 hrs:   BP Temp Temp src Pulse Resp SpO2   05/12/21 2200 116/85 99.2  F (37.3  C) Oral -- 18 93 %   05/12/21 2157 -- -- -- -- -- 93 %   05/12/21 1832 121/80 101  F (38.3  C) Temporal 101 18 97 %     Physical Exam  Vitals signs and nursing note reviewed.   Constitutional:       General: He is not in acute distress.     Appearance: He is not diaphoretic.   HENT:      Head: Normocephalic and atraumatic.   Eyes:      General: No scleral icterus.     Extraocular Movements: Extraocular movements intact.   Cardiovascular:      Rate and Rhythm: Normal rate and  regular rhythm.      Pulses: Normal pulses.      Heart sounds: Normal heart sounds.   Pulmonary:      Effort: Pulmonary effort is normal. No respiratory distress.      Breath sounds: Normal breath sounds.   Abdominal:      General: There is no distension.      Palpations: Abdomen is soft.      Tenderness: There is no abdominal tenderness. There is no guarding.   Musculoskeletal:         General: No tenderness.      Right lower leg: No edema.      Left lower leg: No edema.   Skin:     General: Skin is warm.      Findings: No rash.   Neurological:      Mental Status: He is alert.       Emergency Department Course     ECG  ECG taken at 1959, ECG read at 2005   Normal sinus rhythm  Normal EKG  No significant change as compared to prior, dated 2/3/2020.  Rate 88 bpm. ND interval 178 ms. QRS duration 100 ms. QT/QTc 360/435 ms. P-R-T axes 57 22 54.     Imaging:  XR Chest Port 1 view   IMPRESSION: Few tiny calcified granulomata. Normal heart size. No pulmonary infiltrates  Reading per radiology    Laboratory:  CBC: WBC 6.5, HGB 14.6,   CMP:  Sodium: 132 (L), Calcium: 8.3 (L), o/w WNL (Creatinine: 0.88)    Troponin (Collected 1946): <0.015  Lactic acid (Resulted 2003): 0.7     Blood gas venous: pH Venous 7.54 (H), PCO2 Venous 28 (L), PO2 Venous 46, Bicarbonate 24, Base Excess 2.8     Emergency Department Course:    Reviewed:  I reviewed nursing notes, vitals, past medical history and care everywhere    Assessments:  1926 I obtained history and examined the patient as noted above.   2105 I rechecked the patient. Explained findings to patient.  2210 I rechecked the patient and explained findings.     Interventions:  1839 Tylenol 975 mg PO  2001 NS 1L IV Bolus  2001 Ibuprofen 400 mg PO  2002 Zofran 4mg IV injection   2111 Decadron 10 mg IV  2111 Benadryl 25 mg IV  2117 Reglan 10 mg IV    Disposition:  The patient was discharged to home.     Impression & Plan      Medical Decision Making:  Dontecyndee Vallejo is a 55 year  old male who presents to the emergency department today for evaluation of covid symptoms. He has no adventitious lung sounds, increased respiratory effort, or concerning hypoxemia. His diagnostic labs are reassuring at this time without evidence for sepsis, ACS/CAD, pneumothorax, pneumonia, pulmonary edema, or end organ dysfunction. He had total relief of symptoms in the ED. Discharged home with symptomatic management and precautions for which to return.     Covid-19  Pawel Vallejo was evaluated during a global COVID-19 pandemic, which necessitated consideration that the patient might be at risk for infection with the SARS-CoV-2 virus that causes COVID-19.   Applicable protocols for evaluation were followed during the patient's care.   COVID-19 was considered as part of the patient's evaluation. The plan for testing is:  a test was obtained at a previous visit and reviewed & considered today.      Diagnosis:    ICD-10-CM    1. 2019 novel coronavirus disease (COVID-19)  U07.1 [facility]     Care Coordination Referral   2. Suspected COVID-19 virus infection  Z20.822        Discharge Medications:  New Prescriptions    DIPHENHYDRAMINE (BENADRYL) 50 MG CAPSULE    Take 1 capsule (50 mg) by mouth 4 times daily as needed (to be taken concurrently with Reglan for headache)    METOCLOPRAMIDE (REGLAN) 10 MG TABLET    Take 1 tablet (10 mg) by mouth 4 times daily as needed (headache)    PREDNISONE (DELTASONE) 20 MG TABLET    Take two tablets (= 40mg) each day for 5 (five) days       Scribe Disclosure:  Jaelyn LONGORIA, am serving as a scribe at 7:26 PM on 5/12/2021 to document services personally performed by Loyd Cummings based on my observations and the provider's statements to me.    [facility] EMERGENCY DEPT     Loyd Cummings PA-C  05/12/21 [id number]

## 2021-05-13 NOTE — ED NOTES
Patient alert and oriented. Respirations even and unlabored. All discharge education given. All questions answered. All medications explained in detail. Patient monitoring program explained to patient and pulse oximeter given to patient and he demonstrated proper use. Patient denies further needs and states that they are ready to leave. Patient ambulated out of the ER with steady gait.

## 2021-05-14 ENCOUNTER — PATIENT OUTREACH (OUTPATIENT)
Dept: CARE COORDINATION | Facility: CLINIC | Age: 56
End: 2021-05-14

## 2021-05-16 ENCOUNTER — APPOINTMENT (OUTPATIENT)
Dept: GENERAL RADIOLOGY | Facility: CLINIC | Age: 56
DRG: 177 | End: 2021-05-16
Attending: EMERGENCY MEDICINE
Payer: COMMERCIAL

## 2021-05-16 ENCOUNTER — HOSPITAL ENCOUNTER (INPATIENT)
Facility: CLINIC | Age: 56
LOS: 10 days | Discharge: HOME | DRG: 177 | End: 2021-05-27
Attending: EMERGENCY MEDICINE | Admitting: INTERNAL MEDICINE
Payer: COMMERCIAL

## 2021-05-16 ENCOUNTER — APPOINTMENT (OUTPATIENT)
Dept: CT IMAGING | Facility: CLINIC | Age: 56
DRG: 177 | End: 2021-05-16
Attending: EMERGENCY MEDICINE
Payer: COMMERCIAL

## 2021-05-16 DIAGNOSIS — K21.00 GASTROESOPHAGEAL REFLUX DISEASE WITH ESOPHAGITIS, UNSPECIFIED WHETHER HEMORRHAGE: ICD-10-CM

## 2021-05-16 DIAGNOSIS — U07.1 2019 NOVEL CORONAVIRUS DISEASE (COVID-19): ICD-10-CM

## 2021-05-16 DIAGNOSIS — K21.9 GASTROESOPHAGEAL REFLUX DISEASE WITHOUT ESOPHAGITIS: ICD-10-CM

## 2021-05-16 LAB
ALBUMIN SERPL-MCNC: 2.8 G/DL (ref 3.4–5)
ALP SERPL-CCNC: 85 U/L (ref 40–150)
ALT SERPL W P-5'-P-CCNC: 50 U/L (ref 0–70)
ANION GAP SERPL CALCULATED.3IONS-SCNC: 5 MMOL/L (ref 3–14)
AST SERPL W P-5'-P-CCNC: 34 U/L (ref 0–45)
BASOPHILS # BLD AUTO: 0 10E9/L (ref 0–0.2)
BASOPHILS NFR BLD AUTO: 0.1 %
BILIRUB SERPL-MCNC: 0.2 MG/DL (ref 0.2–1.3)
BUN SERPL-MCNC: 11 MG/DL (ref 7–30)
CALCIUM SERPL-MCNC: 8.1 MG/DL (ref 8.5–10.1)
CHLORIDE SERPL-SCNC: 107 MMOL/L (ref 94–109)
CO2 SERPL-SCNC: 27 MMOL/L (ref 20–32)
CREAT SERPL-MCNC: 0.76 MG/DL (ref 0.66–1.25)
D DIMER PPP FEU-MCNC: 1.1 UG/ML FEU (ref 0–0.5)
DIFFERENTIAL METHOD BLD: ABNORMAL
EOSINOPHIL # BLD AUTO: 0 10E9/L (ref 0–0.7)
EOSINOPHIL NFR BLD AUTO: 0 %
ERYTHROCYTE [DISTWIDTH] IN BLOOD BY AUTOMATED COUNT: 13.2 % (ref 10–15)
GFR SERPL CREATININE-BSD FRML MDRD: >90 ML/MIN/{1.73_M2}
GLUCOSE SERPL-MCNC: 115 MG/DL (ref 70–99)
HCT VFR BLD AUTO: 40.8 % (ref 40–53)
HGB BLD-MCNC: 13.6 G/DL (ref 13.3–17.7)
IMM GRANULOCYTES # BLD: 0 10E9/L (ref 0–0.4)
IMM GRANULOCYTES NFR BLD: 0.4 %
LYMPHOCYTES # BLD AUTO: 0.7 10E9/L (ref 0.8–5.3)
LYMPHOCYTES NFR BLD AUTO: 7.8 %
MCH RBC QN AUTO: 30.8 PG (ref 26.5–33)
MCHC RBC AUTO-ENTMCNC: 33.3 G/DL (ref 31.5–36.5)
MCV RBC AUTO: 93 FL (ref 78–100)
MONOCYTES # BLD AUTO: 0.5 10E9/L (ref 0–1.3)
MONOCYTES NFR BLD AUTO: 6.3 %
NEUTROPHILS # BLD AUTO: 7.2 10E9/L (ref 1.6–8.3)
NEUTROPHILS NFR BLD AUTO: 85.4 %
NRBC # BLD AUTO: 0 10*3/UL
NRBC BLD AUTO-RTO: 0 /100
PLATELET # BLD AUTO: 226 10E9/L (ref 150–450)
POTASSIUM SERPL-SCNC: 4 MMOL/L (ref 3.4–5.3)
PROT SERPL-MCNC: 6.7 G/DL (ref 6.8–8.8)
RBC # BLD AUTO: 4.41 10E12/L (ref 4.4–5.9)
SODIUM SERPL-SCNC: 139 MMOL/L (ref 133–144)
TROPONIN I SERPL-MCNC: <0.015 UG/L (ref 0–0.04)
WBC # BLD AUTO: 8.4 10E9/L (ref 4–11)

## 2021-05-16 PROCEDURE — 99285 EMERGENCY DEPT VISIT HI MDM: CPT | Mod: 25

## 2021-05-16 PROCEDURE — 250N000011 HC RX IP 250 OP 636: Performed by: INTERNAL MEDICINE

## 2021-05-16 PROCEDURE — 96372 THER/PROPH/DIAG INJ SC/IM: CPT | Performed by: INTERNAL MEDICINE

## 2021-05-16 PROCEDURE — G0378 HOSPITAL OBSERVATION PER HR: HCPCS

## 2021-05-16 PROCEDURE — 250N000009 HC RX 250: Performed by: HOSPITALIST

## 2021-05-16 PROCEDURE — 71045 X-RAY EXAM CHEST 1 VIEW: CPT

## 2021-05-16 PROCEDURE — 250N000011 HC RX IP 250 OP 636: Performed by: EMERGENCY MEDICINE

## 2021-05-16 PROCEDURE — 71275 CT ANGIOGRAPHY CHEST: CPT

## 2021-05-16 PROCEDURE — 93005 ELECTROCARDIOGRAM TRACING: CPT

## 2021-05-16 PROCEDURE — 80053 COMPREHEN METABOLIC PANEL: CPT | Performed by: EMERGENCY MEDICINE

## 2021-05-16 PROCEDURE — 84484 ASSAY OF TROPONIN QUANT: CPT | Performed by: EMERGENCY MEDICINE

## 2021-05-16 PROCEDURE — 258N000003 HC RX IP 258 OP 636: Performed by: HOSPITALIST

## 2021-05-16 PROCEDURE — 250N000013 HC RX MED GY IP 250 OP 250 PS 637: Performed by: INTERNAL MEDICINE

## 2021-05-16 PROCEDURE — XW033E5 INTRODUCTION OF REMDESIVIR ANTI-INFECTIVE INTO PERIPHERAL VEIN, PERCUTANEOUS APPROACH, NEW TECHNOLOGY GROUP 5: ICD-10-PCS | Performed by: HOSPITALIST

## 2021-05-16 PROCEDURE — 85379 FIBRIN DEGRADATION QUANT: CPT | Performed by: EMERGENCY MEDICINE

## 2021-05-16 PROCEDURE — 96374 THER/PROPH/DIAG INJ IV PUSH: CPT

## 2021-05-16 PROCEDURE — 250N000011 HC RX IP 250 OP 636: Performed by: HOSPITALIST

## 2021-05-16 PROCEDURE — 99220 PR INITIAL OBSERVATION CARE,LEVEL III: CPT | Performed by: INTERNAL MEDICINE

## 2021-05-16 PROCEDURE — 85025 COMPLETE CBC W/AUTO DIFF WBC: CPT | Performed by: EMERGENCY MEDICINE

## 2021-05-16 RX ORDER — ACETAMINOPHEN 325 MG/1
650 TABLET ORAL EVERY 4 HOURS PRN
Status: DISCONTINUED | OUTPATIENT
Start: 2021-05-16 | End: 2021-05-27 | Stop reason: HOSPADM

## 2021-05-16 RX ORDER — DEXAMETHASONE SODIUM PHOSPHATE 4 MG/ML
6 INJECTION, SOLUTION INTRA-ARTICULAR; INTRALESIONAL; INTRAMUSCULAR; INTRAVENOUS; SOFT TISSUE EVERY 24 HOURS
Status: DISCONTINUED | OUTPATIENT
Start: 2021-05-16 | End: 2021-05-27

## 2021-05-16 RX ORDER — ONDANSETRON 4 MG/1
4 TABLET, ORALLY DISINTEGRATING ORAL EVERY 6 HOURS PRN
Status: DISCONTINUED | OUTPATIENT
Start: 2021-05-16 | End: 2021-05-27 | Stop reason: HOSPADM

## 2021-05-16 RX ORDER — ALBUTEROL SULFATE 90 UG/1
2 AEROSOL, METERED RESPIRATORY (INHALATION) EVERY 4 HOURS PRN
Status: DISCONTINUED | OUTPATIENT
Start: 2021-05-16 | End: 2021-05-21

## 2021-05-16 RX ORDER — DIPHENHYDRAMINE HCL 25 MG
50 CAPSULE ORAL 4 TIMES DAILY PRN
Status: DISCONTINUED | OUTPATIENT
Start: 2021-05-16 | End: 2021-05-27 | Stop reason: HOSPADM

## 2021-05-16 RX ORDER — IOPAMIDOL 755 MG/ML
71 INJECTION, SOLUTION INTRAVASCULAR ONCE
Status: COMPLETED | OUTPATIENT
Start: 2021-05-16 | End: 2021-05-16

## 2021-05-16 RX ORDER — IBUPROFEN 600 MG/1
600 TABLET, FILM COATED ORAL EVERY 6 HOURS PRN
Status: DISCONTINUED | OUTPATIENT
Start: 2021-05-16 | End: 2021-05-27 | Stop reason: HOSPADM

## 2021-05-16 RX ORDER — BENZONATATE 100 MG/1
100 CAPSULE ORAL 3 TIMES DAILY PRN
Status: DISCONTINUED | OUTPATIENT
Start: 2021-05-16 | End: 2021-05-27 | Stop reason: HOSPADM

## 2021-05-16 RX ORDER — ACETAMINOPHEN 325 MG/1
325-650 TABLET ORAL EVERY 6 HOURS PRN
Status: DISCONTINUED | OUTPATIENT
Start: 2021-05-16 | End: 2021-05-16

## 2021-05-16 RX ORDER — ALBUTEROL SULFATE 90 UG/1
2 AEROSOL, METERED RESPIRATORY (INHALATION) EVERY 4 HOURS PRN
Status: ON HOLD | COMMUNITY
End: 2024-05-11

## 2021-05-16 RX ORDER — METOCLOPRAMIDE 10 MG/1
10 TABLET ORAL 4 TIMES DAILY PRN
Status: DISCONTINUED | OUTPATIENT
Start: 2021-05-16 | End: 2021-05-27 | Stop reason: HOSPADM

## 2021-05-16 RX ORDER — ONDANSETRON 2 MG/ML
4 INJECTION INTRAMUSCULAR; INTRAVENOUS EVERY 6 HOURS PRN
Status: DISCONTINUED | OUTPATIENT
Start: 2021-05-16 | End: 2021-05-27 | Stop reason: HOSPADM

## 2021-05-16 RX ORDER — CLOBETASOL PROPIONATE 0.5 MG/G
OINTMENT TOPICAL
COMMUNITY

## 2021-05-16 RX ORDER — POLYETHYLENE GLYCOL 3350 17 G/17G
17 POWDER, FOR SOLUTION ORAL DAILY PRN
Status: DISCONTINUED | OUTPATIENT
Start: 2021-05-16 | End: 2021-05-27 | Stop reason: HOSPADM

## 2021-05-16 RX ORDER — TAMSULOSIN HYDROCHLORIDE 0.4 MG/1
0.4 CAPSULE ORAL DAILY
Status: DISCONTINUED | OUTPATIENT
Start: 2021-05-17 | End: 2021-05-27 | Stop reason: HOSPADM

## 2021-05-16 RX ADMIN — METOCLOPRAMIDE 10 MG: 10 TABLET ORAL at 16:16

## 2021-05-16 RX ADMIN — ENOXAPARIN SODIUM 40 MG: 40 INJECTION SUBCUTANEOUS at 15:01

## 2021-05-16 RX ADMIN — ACETAMINOPHEN 650 MG: 325 TABLET, FILM COATED ORAL at 22:00

## 2021-05-16 RX ADMIN — BENZONATATE 100 MG: 100 CAPSULE ORAL at 15:01

## 2021-05-16 RX ADMIN — IOPAMIDOL 71 ML: 755 INJECTION, SOLUTION INTRAVENOUS at 13:29

## 2021-05-16 RX ADMIN — ACETAMINOPHEN 650 MG: 325 TABLET, FILM COATED ORAL at 15:01

## 2021-05-16 RX ADMIN — DEXAMETHASONE SODIUM PHOSPHATE 6 MG: 4 INJECTION, SOLUTION INTRAMUSCULAR; INTRAVENOUS at 23:22

## 2021-05-16 RX ADMIN — REMDESIVIR 200 MG: 100 INJECTION, POWDER, LYOPHILIZED, FOR SOLUTION INTRAVENOUS at 23:23

## 2021-05-16 RX ADMIN — BENZONATATE 100 MG: 100 CAPSULE ORAL at 22:00

## 2021-05-16 ASSESSMENT — MIFFLIN-ST. JEOR: SCORE: 1606.45

## 2021-05-16 ASSESSMENT — ENCOUNTER SYMPTOMS
COUGH: 1
SHORTNESS OF BREATH: 1
FEVER: 1

## 2021-05-16 NOTE — ED TRIAGE NOTES
Pt arrives with c/o cough, fever, SOB. Pt was diagnosed with COVID last Sunday. Pt took tylenol about 1 hour pta, ibuprofen at 0730. ABCs intact.

## 2021-05-16 NOTE — H&P
[facility]    History and Physical - Hospitalist Service       Date of Admission:  5/16/2021     Assessment & Plan   Pawel Vallejo is a 55 year old male with a history of BPH who is admitted to the hospital service with COVID-19 infection.    COVID-19 pneumonia  - Symptoms started 8 days prior to the date of admission.  Covid test was positive on that date as well.  Presents here on the day of admission complaining of worsening fevers up to 104  F, cough, body aches, headache, shortness of breath and chest pain.  Work-up in the ED shows that the patient is saturating well on room air in the upper 90s.  CT PE protocol done because of elevated D-dimer was negative for PE but showed bilateral groundglass opacities.  - Patient admitted to observation for now because he is not hypoxic.  As he is not hypoxic, there is no known benefit of remdesivir or steroids, so holding off on those.  - Symptomatic treatment for cough and body aches.  - Keep patient on continuous pulse oximetry.  If his O2 sat drops below 92%, start remdesivir and dexamethasone.  - Enoxaparin for DVT prophylaxis    BPH  - Continue tamsulosin.    Diet: Regular  DVT Prophylaxis: Low Risk/Ambulatory with no VTE prophylaxis indicated  Valente Catheter: No  Code Status: Full Code    Disposition Plan   Expected discharge:   Monitor for next 24 hours.  At risk for clinical deterioration from COVID-19.  Reassess tomorrow for discharge to home versus convert to inpatient and longer stay.    Montana Khan MD  [facility]    ______________________________________________________________________    Chief Complaint   SOB, chest pain    History of Present Illness   Pawel Vallejo is a 55 year old male with a history of BPH who presented to the ED complaining of shortness of breath and fevers with a known Covid positive test.  Patient noted feeling sick 8 days ago.  Went locally for Covid test which turned positive.  Was seen  "here in the ED on 5/12 complaining of COVID-19 symptoms and discharged to home.  He returns to the ED today complaining of overall worsening clinical status, high fevers of 104  F, cough, chest pain, shortness of breath.  In the ED, work-up was mostly reassuring with normal O2 sat but patient's clinical appearance was that of decline, hence admission to observation requested.    Review of Systems    The 10 point Review of Systems is negative other than noted in the HPI or here.     Physical Exam   /87 (BP Location: Right arm)   Pulse 72   Temp 98.7  F (37.1  C) (Oral)   Resp 18   Ht 1.778 m (5' 10\")   Wt 77.5 kg (170 lb 14.4 oz)   SpO2 96%   BMI 24.52 kg/m         General: Appears mildly ill but not in acute distress.    HEENT: No scleral icterus. Oropharynx moist.     Neck: Supple.    Pulmonary: Normal work of breathing. Clear to auscultation bilaterally.    Cardiovascular: Regular rate and rhythm without murmur or extra heart sounds.    Abdomen: Soft and non-tender.    Extremities: No peripheral edema. No clubbing or cyanosis.     Neurologic: Awake, alert, appropriate.    Skin: Warm and dry.    Psychiatric: Normal affect and mood.     Data   Data reviewed today: I have reviewed all labs and imaging results.    Recent Labs   Lab 05/16/21  0959 05/12/21 1946   WBC 8.4 6.5   HGB 13.6 14.6   MCV 93 91    179    132*   POTASSIUM 4.0 3.9   CHLORIDE 107 103   CO2 27 23   BUN 11 9   CR 0.76 0.88   ANIONGAP 5 6   RASHI 8.1* 8.3*   * 90   ALBUMIN 2.8* 3.4   PROTTOTAL 6.7* 7.2   BILITOTAL 0.2 0.5   ALKPHOS 85 84   ALT 50 36   AST 34 25   TROPI <0.015 <0.015     Recent Results (from the past 24 hour(s))   XR Chest Port 1 View    Narrative    CHEST ONE VIEW PORTABLE May 16, 2021 11:15 AM     HISTORY: Dyspnea. Chest pain.    COMPARISON: 5/12/2021.      Impression    IMPRESSION: Mild hazy increased density in both lower lungs is new  since the previous exam, and could be infectious in etiology. " Few tiny  calcified granulomas in both lungs are unchanged. No pneumothorax.  Heart size appears stable. Pulmonary vascularity is within normal  limits.   CT Chest Pulmonary Embolism w Contrast    Narrative    CT CHEST PULMONARY EMBOLISM WITH CONTRAST 5/16/2021 1:48 PM    CLINICAL HISTORY: PE suspected, low/intermediate prob, positive  D-dimer. Shortness of breath.    TECHNIQUE: CT angiogram chest during arterial phase injection IV  contrast. 2D and 3D MIP reconstructions were performed by the CT  technologist. Dose reduction techniques were used.     CONTRAST: 71mL Isovue-370    COMPARISON: CT chest 9/6/2014.    FINDINGS:  ANGIOGRAM CHEST: Pulmonary arteries are normal caliber and negative  for pulmonary emboli. Thoracic aorta is negative for dissection.    LUNGS AND PLEURA: Patchy multifocal bilateral irregular groundglass  areas of consolidation mostly with a peripheral distribution involving  all lobes of both lungs. Bibasilar atelectasis. Trace bibasilar  pleural fluid.    MEDIASTINUM/AXILLAE: Calcified lymph nodes. A few minimally prominent  lymph nodes in the chest.    CORONARY ARTERY CALCIFICATION: Mild.    UPPER ABDOMEN: Normal.    MUSCULOSKELETAL: Normal.      Impression    IMPRESSION:  1.  Multifocal patchy bilateral irregular consolidation most  consistent with atypical pneumonia such as COVID-19 pneumonia.  2.  Trace bibasilar pleural fluid.  3.  No evidence for pulmonary embolism.       Past Medical History    I have reviewed this patient's medical history and updated it with pertinent information if needed.   Past Medical History:   Diagnosis Date     BPH (benign prostatic hyperplasia)      Chronic back pain      Gastroesophageal reflux disease with esophagitis      History of hypertension      HLD (hyperlipidemia)      Pleurisy         Past Surgical History    I have reviewed this patient's surgical history and updated it with pertinent information if needed.  Past Surgical History:   Procedure  Laterality Date     CHOLECYSTECTOMY       DECOMPRESSION, FUSION CERVICAL ANTERIOR TWO LEVELS, COMBINED Left 2020    Procedure: Cervical 4 to 6 Anterior Cervical Decompression and Fusion with [employer] Plate and Screws, Interbody Device, use of Fluoroscopy, Microscope;  Surgeon: Pawel Irizarry MD;  Location: [facility] OR     GRAFT BONE FROM ILIAC CREST Left 2020    Procedure: and Left Sided Iliac Crest Autograft;  Surgeon: Pawel Irizarry MD;  Location: [facility] OR     HERNIA REPAIR      Diastasis recti/ abdominal     LEFT shoulder arthroscopic rotator cuff repair, subacromial decompression and bicep tenodesis          Social History    I have reviewed this patient's social history and updated it with pertinent information if needed.  Social History     Tobacco Use     Smoking status: Former Smoker     Packs/day: 0.25     Years: 30.00     Pack years: 7.50     Quit date: 10/2020     Years since quittin.6     Smokeless tobacco: Never Used     Tobacco comment: quit in . restarted in the past couple of years and then smoked more socially.   Substance Use Topics     Alcohol use: Yes     Comment: socially     Drug use: No          Family History    I have reviewed this patient's family history and updated it with pertinent information if needed.     Prior to Admission Medications    Prior to Admission Medications   Prescriptions Last Dose Informant Patient Reported? Taking?   Ascorbic Acid (VITAMIN C PO) Past Week at Unknown time  Yes Yes   Sig: Take 1 tablet by mouth daily   Cholecalciferol (VITAMIN D3 PO) Past Week at Unknown time  Yes Yes   Sig: Take 1 tablet by mouth daily   KRILL OIL PO 5/15/2021 at Unknown time  Yes Yes   Sig: Take 1 capsule by mouth daily    Multiple Vitamins-Minerals (ZINC PO) Past Week at Unknown time  Yes Yes   Sig: Take 1 tablet by mouth daily   VITAMIN A PO Past Week at Unknown time  Yes Yes   Sig: Take 1 tablet by mouth daily   acetaminophen (TYLENOL) 325 MG  tablet 5/16/2021 at am  Yes Yes   Sig: Take 325-650 mg by mouth every 6 hours as needed for mild pain   albuterol (PROAIR HFA/PROVENTIL HFA/VENTOLIN HFA) 108 (90 Base) MCG/ACT inhaler 5/15/2021 at pm  Yes Yes   Sig: Inhale 2 puffs into the lungs every 4 hours as needed for shortness of breath / dyspnea or wheezing   clobetasol (TEMOVATE) 0.05 % external ointment More than a month at Unknown time  Yes No   Sig: Apply topically nightly as needed To psoriasis on foot   diphenhydrAMINE (BENADRYL) 50 MG capsule 5/16/2021 at am  No Yes   Sig: Take 1 capsule (50 mg) by mouth 4 times daily as needed (to be taken concurrently with Reglan for headache)   ibuprofen (ADVIL/MOTRIN) 100 MG tablet 5/16/2021 at am  Yes Yes   Sig: Take 100 mg by mouth every 4 hours as needed   metoclopramide (REGLAN) 10 MG tablet 5/16/2021 at am  No Yes   Sig: Take 1 tablet (10 mg) by mouth 4 times daily as needed (headache)   predniSONE (DELTASONE) 20 MG tablet 5/16/2021 at am  No Yes   Sig: Take two tablets (= 40mg) each day for 5 (five) days   tamsulosin (FLOMAX) 0.4 MG capsule 5/15/2021 at Unknown time  Yes Yes   Sig: Take 0.4 mg by mouth daily       Facility-Administered Medications: None        Allergies    No Known Allergies

## 2021-05-16 NOTE — PLAN OF CARE
ROOM # 202-2    Living Situation (if not independent, order SW consult): with wife  Facility name:  : Wife (Ann)    Activity level at baseline: ind  Activity level on admit: ind      Patient registered to observation; given Patient Bill of Rights; given the opportunity to ask questions about observation status and their plan of care.  Patient has been oriented to the observation room, bathroom and call light is in place.    Discussed discharge goals and expectations with patient/family.

## 2021-05-16 NOTE — ED PROVIDER NOTES
History   Chief Complaint:  Shortness of Breath, Fever, and Cough       The history is provided by the patient.      Pawel Vallejo is a 55 year old male with history of hypertension abnxiety who presents with fever, cough, and shortness of breath as he was diagnosed with Covid a week ago. He took Tylenol an hour PTA and ibuprofen 2 hours ago. No other symptoms at this time.     Review of Systems   Constitutional: Positive for fever.   Respiratory: Positive for cough and shortness of breath.    All other systems reviewed and are negative.      Allergies:  The patient has no known allergies.     Medications:  Flomax  Reglan  Deltasone  Zanaflex    Past Medical History:    BPH  GERD  Hypertension  Hyperlipidemia  Pleurisy  Adenomatous polyp of colon  Cervical spondylosis  Diastasis recti  Umbilical hernia  Anxiety    Past Surgical History:    Cholecystectomy  Decompression fusion cervical  Graft bone from iliac crest  Hernia repair  Left shoulder arthroscopic rotator cuff repair  Forearm/wrist procedure  EGD  Colonoscopy    Family History:    Father: Cancer, diabetes    Social History:  Patient presents to the ED alone.    Physical Exam     Patient Vitals for the past 24 hrs:   BP Temp Temp src Pulse Resp SpO2   05/16/21 1230 121/87 -- -- 76 -- 96 %   05/16/21 1215 121/85 -- -- 64 -- 96 %   05/16/21 1200 125/88 -- -- 68 -- 94 %   05/16/21 1145 128/87 -- -- 70 -- 96 %   05/16/21 1130 123/84 -- -- 68 -- 96 %   05/16/21 1115 132/85 -- -- 68 -- 95 %   05/16/21 1100 130/80 -- -- 61 -- 96 %   05/16/21 1045 115/78 -- -- 65 -- 96 %   05/16/21 1034 -- -- -- -- 18 --   05/16/21 1030 -- -- -- -- -- 95 %   05/16/21 1015 137/83 -- -- 67 -- 92 %   05/16/21 1000 117/84 -- -- 77 -- 94 %   05/16/21 0957 -- 99.6  F (37.6  C) Oral -- -- --   05/16/21 0939 -- -- -- -- -- 94 %   05/16/21 0906 127/77 99.5  F (37.5  C) Temporal 97 20 95 %       Physical Exam  Nursing note and vitals reviewed.  Constitutional: Cooperative.   HENT:    Mouth/Throat: Moist mucous membranes.   Eyes: EOMI, nonicteric sclera  Cardiovascular: Normal rate, regular rhythm, no murmurs, rubs, or gallops  Pulmonary/Chest: Initial respiratory rate about 24-30. Lungs are clear. Mild distress. Patient appears to be working hard to breathe.   Abdominal: Soft. Nontender, nondistended, no guarding or rigidity.   Musculoskeletal: Normal range of motion.   Neurological: Alert. Moves all extremities spontaneously.   Skin: Skin is warm and dry. No rash noted.   Psychiatric: Normal mood and affect.     Emergency Department Course   ECG  ECG taken at 0956, ECG read at 1000  Sinus rhythm. Normal ECG.   Rate 80 bpm. MT interval 172 ms. QRS duration 96 ms. QT/QTc 358/412 ms. P-R-T axes 57 31 29.     Imaging:  XR Chest, Portable, G/E 1 view:   Mild hazy increased density in both lower lungs is new  since the previous exam, and could be infectious in etiology. Few tiny  calcified granulomas in both lungs are unchanged. No pneumothorax.  Heart size appears stable. Pulmonary vascularity is within normal  limits. As per radiology.    CT Chest w/ IV contrast - PE protocol:   1.  Multifocal patchy bilateral irregular consolidation most  consistent with atypical pneumonia such as COVID-19 pneumonia.  2.  Trace bibasilar pleural fluid.  3.  No evidence for pulmonary embolism. As per radiology.     Laboratory:   CBC: WBC: 8.4, HGB: 13.6, PLT: 226    CMP: Glucose 115 (H), Calcium: 115 (H), Albumin: 2.8 (L), Protein Total: 6.7 (L), o/w WNL (Creatinine: 0.76)    Troponin (Collected 0959): <0.015    D-dimer: 1.1 (H)    Emergency Department Course:    Reviewed:  I reviewed nursing notes, vitals, past medical history and care everywhere    Assessments:  0952 I obtained history and examined the patient as noted above.      I rechecked the patient and explained findings.     Consults:   1251 Spoke with Dr. Khan, hospitalist, who accepts the patient.     Disposition:  The patient was admitted to the  [facility] under the care of Dr. Khan.     Impression & Plan   Covid-19  Pawel Vallejo was evaluated during a global COVID-19 pandemic, which necessitated consideration that the patient might be at risk for infection with the SARS-CoV-2 virus that causes COVID-19.   Applicable protocols for evaluation were followed during the patient's care.   COVID-19 was considered as part of the patient's evaluation. The plan for testing is:  a test was obtained at a previous visit and reviewed & considered today.    Medical Decision Making:  Patient presents with shortness of breath in the context of known COVID-19. He notes low oxygen saturations at home, but here he is maintaining his oxygen saturations both at rest and with mild exertion. Overall though, he appears quite short of breath, and somewhat ill-appearing. He reports much worsened symptoms in the last 24 hours. I did start oxygen which did improve his work of breathing substantially with just 2 L. I spoke with him and his wife via speaker phone, and given his increased work of breathing and his clinical course, I did offer observation admission to the hospital which they were both in agreement with. Patient therefore admitted in stable condition. Dr. Khan from our hospitalist service accepts. All questions answered.      Diagnosis:    ICD-10-CM    1. 2019 novel coronavirus disease (COVID-19)  U07.1        Scribe Disclosure:  I, Fernando Grewal, am serving as a scribe at 9:11 AM on 5/16/2021 to document services personally performed by Guillermo Kumar MD based on my observations and the provider's statements to me.              Guillermo Kumar MD  05/16/21 1127

## 2021-05-16 NOTE — PLAN OF CARE
PRIMARY DIAGNOSIS: ASTHMA  OUTPATIENT/OBSERVATION GOALS TO BE MET BEFORE DISCHARGE:  1. Vital signs stable: Yes    2. Dyspnea improved and O2 sats >88% at RA or at prior home O2 therapy level: Yes      SpO2: 95 %, O2 Device: None (Room air)    4. Short term supplemental O2 needed for use with activity at home: No    5. Tolerating adequate PO diet and medications: Yes    6. Return to near baseline physical activity: Yes    Patient is alert and oriented x4. VSS, not requiring O2. Continuous pulse ox applied. Only pain in head, rating pain 5 out of 10 aching. PRN tylenol and reglan given. SL. Regular diet tolerated. Ind in room. Will continue to monitor.     Discharge Planner Nurse   Safe discharge environment identified: Yes  Barriers to discharge: No       Entered by: Padmini Sapp 05/16/2021 4:31 PM     Please review provider order for any additional goals.   Nurse to notify provider when observation goals have been met and patient is ready for discharge.

## 2021-05-16 NOTE — PHARMACY-ADMISSION MEDICATION HISTORY
Admission medication history interview status for this patient is complete. See [facility] admission navigator for allergy information, prior to admission medications and immunization status.     Medication history interview done, indicate source(s): Patient  Medication history resources (including written lists, pill bottles, clinic record): care everywhere  Pharmacy: [facility] in [place]    Changes made to PTA medication list:  Added: zinc, vitamin a, vitamin d, vitamin c, albuterol, clobetasol  Deleted: tizanidine, duplicate of tizanidine  Changed: nothing    Actions taken by pharmacist (provider contacted, etc):None     Additional medication history information: Patient started taking prednisone on Wednesday, and believes that he might have a day or so left in the 5-day regimen    Medication reconciliation/reorder completed by provider prior to medication history?  N   (Y/N)     Prior to Admission medications    Medication Sig Last Dose Taking? Auth Provider   acetaminophen (TYLENOL) 325 MG tablet Take 325-650 mg by mouth every 6 hours as needed for mild pain 5/16/2021 at am Yes Reported, Patient   albuterol (PROAIR HFA/PROVENTIL HFA/VENTOLIN HFA) 108 (90 Base) MCG/ACT inhaler Inhale 2 puffs into the lungs every 4 hours as needed for shortness of breath / dyspnea or wheezing 5/15/2021 at pm Yes Unknown, Entered By History   Ascorbic Acid (VITAMIN C PO) Take 1 tablet by mouth daily Past Week at Unknown time Yes Unknown, Entered By History   Cholecalciferol (VITAMIN D3 PO) Take 1 tablet by mouth daily Past Week at Unknown time Yes Unknown, Entered By History   diphenhydrAMINE (BENADRYL) 50 MG capsule Take 1 capsule (50 mg) by mouth 4 times daily as needed (to be taken concurrently with Reglan for headache) 5/16/2021 at am Yes Loyd Cummings PA-C   ibuprofen (ADVIL/MOTRIN) 100 MG tablet Take 100 mg by mouth every 4 hours as needed 5/16/2021 at am Yes Reported, Patient   KRILL OIL PO Take 1 capsule by  mouth daily  5/15/2021 at Unknown time Yes Unknown, Entered By History   metoclopramide (REGLAN) 10 MG tablet Take 1 tablet (10 mg) by mouth 4 times daily as needed (headache) 5/16/2021 at am Yes Loyd Cummings PA-C   Multiple Vitamins-Minerals (ZINC PO) Take 1 tablet by mouth daily Past Week at Unknown time Yes Unknown, Entered By History   predniSONE (DELTASONE) 20 MG tablet Take two tablets (= 40mg) each day for 5 (five) days 5/16/2021 at am Yes Loyd Cummings PA-C   tamsulosin (FLOMAX) 0.4 MG capsule Take 0.4 mg by mouth daily 5/15/2021 at Unknown time Yes Reported, Patient   VITAMIN A PO Take 1 tablet by mouth daily Past Week at Unknown time Yes Unknown, Entered By History   clobetasol (TEMOVATE) 0.05 % external ointment Apply topically nightly as needed To psoriasis on foot More than a month at Unknown time  Unknown, Entered By History

## 2021-05-16 NOTE — ED NOTES
[facility]  ED Nurse Handoff Report    Pawel Vallejo is a 55 year old male   ED Chief complaint: Shortness of Breath, Fever, and Cough  . ED Diagnosis:   Final diagnoses:   2019 novel coronavirus disease (COVID-19)     Allergies: No Known Allergies    Code Status: Full Code  Activity level - Baseline/Home:  Independent. Activity Level - Current:   Independent. Lift room needed: No. Bariatric: No   Needed: No   Isolation: Yes. Infection: Not Applicable  COVID r/o and special precautions.     Vital Signs:   Vitals:    05/16/21 1145 05/16/21 1200 05/16/21 1215 05/16/21 1230   BP: 128/87 125/88 121/85 121/87   Pulse: 70 68 64 76   Resp:       Temp:       TempSrc:       SpO2: 96% 94% 96% 96%       Cardiac Rhythm:  ,      Pain level:    Patient confused: No. Patient Falls Risk: Yes.   Elimination Status: Has voided   Patient Report - Initial Complaint:  Pt arrives with c/o cough, fever, SOB. Pt was diagnosed with COVID last Sunday. Pt took tylenol about 1 hour pta, ibuprofen at 0730. ABCs intact.  . Focused Assessment:  Respiratory - Respiratory WDL: cough (pt states that he feels short of breath; worse with exhertion), high fevers at home.    Tests Performed: labs, xray, CT. Abnormal Results:   XR Chest Port 1 View   Preliminary Result   IMPRESSION: Mild hazy increased density in both lower lungs is new   since the previous exam, and could be infectious in etiology. Few tiny   calcified granulomas in both lungs are unchanged. No pneumothorax.   Heart size appears stable. Pulmonary vascularity is within normal   limits.      CT Chest Pulmonary Embolism w Contrast    (Results Pending)     Labs Ordered and Resulted from Time of ED Arrival Up to the Time of Departure from the ED   CBC WITH PLATELETS DIFFERENTIAL - Abnormal; Notable for the following components:       Result Value    Absolute Lymphocytes 0.7 (*)     All other components within normal limits   COMPREHENSIVE METABOLIC PANEL -  Abnormal; Notable for the following components:    Glucose 115 (*)     Calcium 8.1 (*)     Albumin 2.8 (*)     Protein Total 6.7 (*)     All other components within normal limits   D DIMER QUANTITATIVE - Abnormal; Notable for the following components:    D Dimer 1.1 (*)     All other components within normal limits   TROPONIN I   PATIENT CARE ORDER   .   Treatments provided: none  Family Comments: family notified of patient status  OBS brochure/video discussed/provided to patient:  Yes  ED Medications: Medications - No data to display  Drips infusing:  No  For the majority of the shift, the patient's behavior Green. Interventions performed were none.    Sepsis treatment initiated: No     Patient tested for COVID 19 prior to admission: YES    ED Nurse Name/Phone Number: Padmini Nelson RN,   12:55 PM    RECEIVING UNIT ED HANDOFF REVIEW    Above ED Nurse Handoff Report was reviewed: Yes  Reviewed by: Joanna Serna RN on May 16, 2021 at 1:52 PM

## 2021-05-16 NOTE — ED NOTES
Pt ambulated with pulse oximeter applied. Oxygen saturation maintained at 96% with walking. Pt states that he feels tired while walking and somewhat lightheaded. Pt appears weak.

## 2021-05-17 LAB
CRP SERPL-MCNC: 21.5 MG/L (ref 0–8)
INTERPRETATION ECG - MUSE: NORMAL

## 2021-05-17 PROCEDURE — 250N000011 HC RX IP 250 OP 636: Performed by: HOSPITALIST

## 2021-05-17 PROCEDURE — 258N000003 HC RX IP 258 OP 636: Performed by: HOSPITALIST

## 2021-05-17 PROCEDURE — 99232 SBSQ HOSP IP/OBS MODERATE 35: CPT | Performed by: INTERNAL MEDICINE

## 2021-05-17 PROCEDURE — 250N000009 HC RX 250: Performed by: HOSPITALIST

## 2021-05-17 PROCEDURE — 120N000004 HC R&B MS OVERFLOW

## 2021-05-17 PROCEDURE — 36415 COLL VENOUS BLD VENIPUNCTURE: CPT | Performed by: INTERNAL MEDICINE

## 2021-05-17 PROCEDURE — 250N000011 HC RX IP 250 OP 636: Performed by: INTERNAL MEDICINE

## 2021-05-17 PROCEDURE — 86140 C-REACTIVE PROTEIN: CPT | Performed by: INTERNAL MEDICINE

## 2021-05-17 PROCEDURE — 250N000013 HC RX MED GY IP 250 OP 250 PS 637: Performed by: INTERNAL MEDICINE

## 2021-05-17 PROCEDURE — G0378 HOSPITAL OBSERVATION PER HR: HCPCS

## 2021-05-17 RX ADMIN — DEXAMETHASONE SODIUM PHOSPHATE 6 MG: 4 INJECTION, SOLUTION INTRAMUSCULAR; INTRAVENOUS at 23:29

## 2021-05-17 RX ADMIN — BENZONATATE 100 MG: 100 CAPSULE ORAL at 04:14

## 2021-05-17 RX ADMIN — Medication 1 LOZENGE: at 19:01

## 2021-05-17 RX ADMIN — ACETAMINOPHEN 650 MG: 325 TABLET, FILM COATED ORAL at 14:34

## 2021-05-17 RX ADMIN — ACETAMINOPHEN 650 MG: 325 TABLET, FILM COATED ORAL at 04:14

## 2021-05-17 RX ADMIN — SODIUM CHLORIDE 50 ML: 9 INJECTION, SOLUTION INTRAVENOUS at 01:32

## 2021-05-17 RX ADMIN — TAMSULOSIN HYDROCHLORIDE 0.4 MG: 0.4 CAPSULE ORAL at 08:56

## 2021-05-17 RX ADMIN — SODIUM CHLORIDE 50 ML: 9 INJECTION, SOLUTION INTRAVENOUS at 17:55

## 2021-05-17 RX ADMIN — REMDESIVIR 100 MG: 100 INJECTION, POWDER, LYOPHILIZED, FOR SOLUTION INTRAVENOUS at 17:59

## 2021-05-17 RX ADMIN — BENZONATATE 100 MG: 100 CAPSULE ORAL at 17:34

## 2021-05-17 RX ADMIN — ENOXAPARIN SODIUM 40 MG: 40 INJECTION SUBCUTANEOUS at 14:34

## 2021-05-17 NOTE — PROGRESS NOTES
Ordered dexamethasone and remdesivir therapy.  Pt desaturating to high 80's on RA.    Bharath Landin MD

## 2021-05-17 NOTE — PLAN OF CARE
PRIMARY DIAGNOSIS: COVID 19  OUTPATIENT/OBSERVATION GOALS TO BE MET BEFORE DISCHARGE:  ADLs back to baseline: Yes    Activity and level of assistance: Ambulating independently.    Pain status: Pain free.    Return to near baseline physical activity: Yes     Discharge Planner Nurse   Safe discharge environment identified: Yes  Barriers to discharge: Yes       Entered by: Dipika Delacruz 05/17/2021 2:13 AM    Pt is now requiring 2LNC and has an increased temp, decadron and remdesivir given, patient now weaned down to 1LNC, denies pain, SOB with activity, tessalon pearls given for cough, cont pulse ox on, will continue to monitor       Please review provider order for any additional goals.   Nurse to notify provider when observation goals have been met and patient is ready for discharge.

## 2021-05-17 NOTE — PLAN OF CARE
AOx3. Up ad shree.  O2 sats stable on 2LO2 NC. Max T this shift 99.Tylenol given. Plan to continue remdesivir, decadron, lovenox, and O2.

## 2021-05-17 NOTE — PLAN OF CARE
PRIMARY DIAGNOSIS: COVID 19  OUTPATIENT/OBSERVATION GOALS TO BE MET BEFORE DISCHARGE:  ADLs back to baseline: Yes    Activity and level of assistance: Ambulating independently.    Pain status: Pain free.    Return to near baseline physical activity: Yes     Discharge Planner Nurse   Safe discharge environment identified: Yes  Barriers to discharge: Yes       Entered by: Dipika Delacruz 05/17/2021 5:03 AM    Pt is still requiring 1LNC, denies pain at this time, still has increased temperature-tylenol given, SOB when out of bed, tessalon pearls for cough, received dose of Remdesivir and decadron, will continue to wean     Please review provider order for any additional goals.   Nurse to notify provider when observation goals have been met and patient is ready for discharge.

## 2021-05-17 NOTE — PROGRESS NOTES
[facility]    Medicine Progress Note - Hospitalist Service       Date of Admission:  5/16/2021  Length of stay: 0 days    Assessment & Plan   Pawel Vallejo is a 55-year-old male with a history of BPH admitted to the hospital service with acute hypoxic respiratory failure secondary to COVID-19 pneumonia.    Today-patient saturating okay on 1 L supplemental O2.  Continue Decadron + remdesivir as outlined below.    COVID-19 pneumonia  Acute hypoxic respiratory failure  - Symptoms started 8 days prior to the date of admission.  Covid test was positive on that date as well.  Presented here on the day of admission complaining of worsening fevers up to 104  F, cough, body aches, headache, shortness of breath and chest pain.  Work-up in the ED shows that the patient is saturating well on room air in the upper 90s.  CT PE protocol done because of elevated D-dimer was negative for PE but showed bilateral groundglass opacities.  - Patient was initially admitted to observation given ill appearance.  He then developed hypoxia overnight on 5/16 and was placed on supplemental O2.  Also started on Decadron and remdesivir.  - Needs further monitoring as an inpatient because of his new oxygen requirement.  - Continue Decadron 6 mg daily.  - Continue remdesivir 5-day course.    - Continue Lovenox for DVT prophylaxis  - Wean O2 as tolerated.  - Trend CRP    Diet: Regular  DVT Prophylaxis: Enoxaparin (Lovenox) SQ  Malnutrition: No  Valente Catheter: No  Code Status: Full Code     Disposition Plan   Expected discharge:   Expect that he would stay in the hospital at least one if not 2 or 3 more nights.    Montana Khan MD  Hospitalist Service  [facility]  ______________________________________________________________________    Interval History   Became febrile and hypoxic overnight.  Started on O2 and Remdesivir/Decadron.  This morning, he feels that his breathing is actually a little bit better.   "Otherwise, no new issues.    Data reviewed today: I reviewed all medications, new labs and imaging results over the last 24 hours.     Physical Exam   /79 (BP Location: Left arm)   Pulse 92   Temp 98.5  F (36.9  C) (Oral)   Resp 18   Ht 1.778 m (5' 10\")   Wt 77.5 kg (170 lb 14.4 oz)   SpO2 97%   BMI 24.52 kg/m    170 lbs 14.4 oz       General: Coughing occasionally.    HEENT: No scleral icterus. Oropharynx moist.     Neck: Supple. Normal range of motion.     Pulmonary: Normal work of breathing.  Coarse anterior breath sounds bilaterally.    Cardiovascular: Regular rate and rhythm without murmur or extra heart sounds.    Abdomen: Soft and non-tender.    Extremities: No peripheral edema. No clubbing or cyanosis.     Neurologic: Awake, alert, appropriate.    Skin: Warm and dry.    Psychiatric: Normal affect and mood.     Data        Medications      Current Facility-Administered Medications   Medication Dose Route Frequency     remdesivir  100 mg Intravenous Q24H    And     sodium chloride 0.9%  50 mL Intravenous Q24H     dexamethasone  6 mg Intravenous Q24H     enoxaparin ANTICOAGULANT  40 mg Subcutaneous Q24H     tamsulosin  0.4 mg Oral Daily       "

## 2021-05-17 NOTE — PLAN OF CARE
PRIMARY DIAGNOSIS: COVID 19  OUTPATIENT/OBSERVATION GOALS TO BE MET BEFORE DISCHARGE:  ADLs back to baseline: Yes    Activity and level of assistance: Ambulating independently.    Pain status: Pain free.    Return to near baseline physical activity: Yes     Discharge Planner Nurse   Safe discharge environment identified: Yes  Barriers to discharge: Yes       Entered by: Dipika Delacruz 05/16/2021 7:56 PM    VSS, pt is A&Ox4, up independently, denies pain at this time, feeling slightly SOB, continuous pulse ox on, on RA, will continue to monitor        Please review provider order for any additional goals.   Nurse to notify provider when observation goals have been met and patient is ready for discharge.

## 2021-05-17 NOTE — PROGRESS NOTES
Provider notified of temp of 102.7 and patient now requiring 2LNC. Patient was 89% on RA, now 94% on 2LNC

## 2021-05-18 LAB — CRP SERPL-MCNC: 15 MG/L (ref 0–8)

## 2021-05-18 PROCEDURE — 86140 C-REACTIVE PROTEIN: CPT | Performed by: INTERNAL MEDICINE

## 2021-05-18 PROCEDURE — 258N000003 HC RX IP 258 OP 636: Performed by: HOSPITALIST

## 2021-05-18 PROCEDURE — 250N000011 HC RX IP 250 OP 636: Performed by: HOSPITALIST

## 2021-05-18 PROCEDURE — 120N000004 HC R&B MS OVERFLOW

## 2021-05-18 PROCEDURE — 250N000011 HC RX IP 250 OP 636: Performed by: INTERNAL MEDICINE

## 2021-05-18 PROCEDURE — 99233 SBSQ HOSP IP/OBS HIGH 50: CPT | Performed by: HOSPITALIST

## 2021-05-18 PROCEDURE — 36415 COLL VENOUS BLD VENIPUNCTURE: CPT | Performed by: INTERNAL MEDICINE

## 2021-05-18 PROCEDURE — 250N000009 HC RX 250: Performed by: HOSPITALIST

## 2021-05-18 PROCEDURE — 250N000013 HC RX MED GY IP 250 OP 250 PS 637: Performed by: INTERNAL MEDICINE

## 2021-05-18 RX ADMIN — BENZONATATE 100 MG: 100 CAPSULE ORAL at 08:44

## 2021-05-18 RX ADMIN — Medication 1 LOZENGE: at 14:20

## 2021-05-18 RX ADMIN — TAMSULOSIN HYDROCHLORIDE 0.4 MG: 0.4 CAPSULE ORAL at 08:29

## 2021-05-18 RX ADMIN — SODIUM CHLORIDE 50 ML: 9 INJECTION, SOLUTION INTRAVENOUS at 16:23

## 2021-05-18 RX ADMIN — ENOXAPARIN SODIUM 40 MG: 40 INJECTION SUBCUTANEOUS at 14:20

## 2021-05-18 RX ADMIN — ACETAMINOPHEN 650 MG: 325 TABLET, FILM COATED ORAL at 00:27

## 2021-05-18 RX ADMIN — DEXAMETHASONE SODIUM PHOSPHATE 6 MG: 4 INJECTION, SOLUTION INTRAMUSCULAR; INTRAVENOUS at 22:01

## 2021-05-18 RX ADMIN — Medication 1 LOZENGE: at 00:27

## 2021-05-18 RX ADMIN — DIPHENHYDRAMINE HYDROCHLORIDE 50 MG: 25 CAPSULE ORAL at 16:25

## 2021-05-18 RX ADMIN — REMDESIVIR 100 MG: 100 INJECTION, POWDER, LYOPHILIZED, FOR SOLUTION INTRAVENOUS at 16:22

## 2021-05-18 RX ADMIN — Medication 1 LOZENGE: at 16:15

## 2021-05-18 RX ADMIN — Medication 1 LOZENGE: at 08:39

## 2021-05-18 RX ADMIN — BENZONATATE 100 MG: 100 CAPSULE ORAL at 00:27

## 2021-05-18 RX ADMIN — Medication 1 LOZENGE: at 21:59

## 2021-05-18 RX ADMIN — Medication 1 LOZENGE: at 12:36

## 2021-05-18 RX ADMIN — Medication 1 LOZENGE: at 18:15

## 2021-05-18 RX ADMIN — METOCLOPRAMIDE 10 MG: 10 TABLET ORAL at 16:25

## 2021-05-18 NOTE — PROGRESS NOTES
"Vitals: /88 (BP Location: Left arm)   Pulse 78   Temp 98.7  F (37.1  C) (Oral)   Resp 17   Ht 1.778 m (5' 10\")   Wt 77.5 kg (170 lb 14.4 oz)   SpO2 96%   BMI 24.52 kg/m       [id number]    Neuro: alert and oriented x4. Able to make needs known.  Cardiac: wdl  Lungs: wdl. On 2L oxygen via nasal cannula with sats >95%. Infrequent cough.   GI: wdl  : wdl  Pain: chest pain during and after coughing per pt report. Tessalon given. And Lozenge given for throat discomfort.    IV: Remdesvir (day 2), Decadron (day 2), saline locked otherwise.   Labs/Imaging: d-dimer 1.1. CRP 21.5  Diet: regular diet. Good appetite.   Activity: up independently   Isolation: special precaution for Covid 19   Plan: continue IV Remdesvir and Decadron. Monitor on pulse-ox and wean off oxygen if pt tolerates. Encourage fluid. Continue special precaution.     Will continue to monitor and provide supportive care    "

## 2021-05-18 NOTE — PLAN OF CARE
"Pt alert and oriented x4. He denies pain. Up indep in room. SOB w/ activity. On 1 L O2, sats are 93%. Receiving decadron, Lovenox and remdesivir. Pt has cough - using tessalon and lozenges. SL. VSS. Encouraging IS. Will cont supportive cares.    /81 (BP Location: Left arm)   Pulse 74   Temp 97.4  F (36.3  C) (Oral)   Resp 20   Ht 1.778 m (5' 10\")   Wt 77.5 kg (170 lb 14.4 oz)   SpO2 96%   BMI 24.52 kg/m      "

## 2021-05-18 NOTE — PLAN OF CARE
"/78 (BP Location: Left arm)   Pulse 69   Temp 97.5  F (36.4  C) (Oral)   Resp 20   Ht 1.778 m (5' 10\")   Wt 77.5 kg (170 lb 14.4 oz)   SpO2 95%   BMI 24.52 kg/m    Neuro: WDL  Cardiac: WDL  Lungs: Coarse.  O2 sats 95% and above with 2L of nasal cannula.  Prersistant coughing.  GI: WDL  :WDL  Pain: Denied  IV: Saline locked.  Meds: Tylenol, Tessalon, Remdesivir, and Benzocaine-Menthol  Labs/tests: N/A  Diet: Well tolerated.  Activity: Independent  Misc: N/A  Plan: Continue to monitor respiratory status closely.      Patient is stable and on room air.  Independent, on a regular diet. Temp 99.8.  Will continue to monitor and provide cares this morning.     "

## 2021-05-18 NOTE — PROGRESS NOTES
"[facility]    Medicine Progress Note - Hospitalist Service       Date of Admission:  5/16/2021      Assessment & Plan   Pawel Vallejo is a 55-year-old male with a history of BPH admitted on 5/16 to the hospital service with acute hypoxic respiratory failure secondary to COVID-19 pneumonia. Still requiring 2L O2, day 3/5 remdesivir.    Acute hypoxic respiratory failure due to covid 19 pneumonia    - symptoms started 8 days prior to the date of admission with covid + test on the same day (5/8)    - was in the ED on 5/12 and discharged home    - CT in ED: ground glass opacities    - started on decadron and remdesivir (Day 3/5)    - on lovenox for DVT prophylaxis    - incentive spirometry    BPH    - cont flomax    Called wife to update her and answer questions. She was angry that this was the first call she was getting to update her. She was also angry that she could not come up and see him. I explained that her  could have indicated to the MD that she wanted a call and that the visiting guidelines were made by [facility].     Diet: Regular  DVT Prophylaxis: Enoxaparin (Lovenox) SQ  Malnutrition: No  Valente Catheter: No  Code Status: Full Code     Disposition Plan   Expected discharge: 1-3 days    Aramis Hunter MD  Hospitalist Service  [facility]  ______________________________________________________________________    Interval History   Last fever: 100.4 yesterday 4am. Patient feeling better. Still has some chest pain and sob with coughing. No abdo pain, n/v/d.  Data reviewed today: I reviewed all medications, new labs and imaging results over the last 24 hours.     Physical Exam   /71 (BP Location: Left arm)   Pulse 76   Temp 98.5  F (36.9  C) (Oral)   Resp 20   Ht 1.778 m (5' 10\")   Wt 77.5 kg (170 lb 14.4 oz)   SpO2 91%   BMI 24.52 kg/m    170 lbs 14.4 oz       General: Coughing occasionally.    HEENT: No scleral icterus. Oropharynx moist.     Neck: Supple. " Normal range of motion.     Pulmonary: Normal work of breathing. Lungs are clear bilaterally    Cardiovascular: Regular rate and rhythm without murmur or extra heart sounds.    Abdomen: Soft and non-tender.    Extremities: No peripheral edema. No clubbing or cyanosis.     Neurologic: Awake, alert, appropriate.    Skin: Warm and dry.    Psychiatric: Normal affect and mood.     Data        Medications      Current Facility-Administered Medications   Medication Dose Route Frequency     remdesivir  100 mg Intravenous Q24H    And     sodium chloride 0.9%  50 mL Intravenous Q24H     dexamethasone  6 mg Intravenous Q24H     enoxaparin ANTICOAGULANT  40 mg Subcutaneous Q24H     tamsulosin  0.4 mg Oral Daily

## 2021-05-19 LAB
ALBUMIN SERPL-MCNC: 2.5 G/DL (ref 3.4–5)
ALP SERPL-CCNC: 112 U/L (ref 40–150)
ALT SERPL W P-5'-P-CCNC: 120 U/L (ref 0–70)
AST SERPL W P-5'-P-CCNC: 75 U/L (ref 0–45)
BILIRUB DIRECT SERPL-MCNC: <0.1 MG/DL (ref 0–0.2)
BILIRUB SERPL-MCNC: 0.3 MG/DL (ref 0.2–1.3)
PLATELET # BLD AUTO: 327 10E9/L (ref 150–450)
PROT SERPL-MCNC: 7 G/DL (ref 6.8–8.8)

## 2021-05-19 PROCEDURE — 250N000011 HC RX IP 250 OP 636: Performed by: INTERNAL MEDICINE

## 2021-05-19 PROCEDURE — 85049 AUTOMATED PLATELET COUNT: CPT | Performed by: INTERNAL MEDICINE

## 2021-05-19 PROCEDURE — 36415 COLL VENOUS BLD VENIPUNCTURE: CPT | Performed by: INTERNAL MEDICINE

## 2021-05-19 PROCEDURE — 250N000009 HC RX 250: Performed by: HOSPITALIST

## 2021-05-19 PROCEDURE — 80076 HEPATIC FUNCTION PANEL: CPT | Performed by: INTERNAL MEDICINE

## 2021-05-19 PROCEDURE — 99232 SBSQ HOSP IP/OBS MODERATE 35: CPT | Mod: GC | Performed by: INTERNAL MEDICINE

## 2021-05-19 PROCEDURE — 999N000157 HC STATISTIC RCP TIME EA 10 MIN

## 2021-05-19 PROCEDURE — 258N000003 HC RX IP 258 OP 636: Performed by: HOSPITALIST

## 2021-05-19 PROCEDURE — 250N000011 HC RX IP 250 OP 636: Performed by: HOSPITALIST

## 2021-05-19 PROCEDURE — 120N000004 HC R&B MS OVERFLOW

## 2021-05-19 PROCEDURE — 250N000013 HC RX MED GY IP 250 OP 250 PS 637: Performed by: INTERNAL MEDICINE

## 2021-05-19 RX ORDER — OXYCODONE HYDROCHLORIDE 5 MG/1
5 TABLET ORAL ONCE
Status: COMPLETED | OUTPATIENT
Start: 2021-05-19 | End: 2021-05-19

## 2021-05-19 RX ADMIN — IBUPROFEN 600 MG: 600 TABLET, FILM COATED ORAL at 12:54

## 2021-05-19 RX ADMIN — DIPHENHYDRAMINE HYDROCHLORIDE 50 MG: 25 CAPSULE ORAL at 09:33

## 2021-05-19 RX ADMIN — REMDESIVIR 100 MG: 100 INJECTION, POWDER, LYOPHILIZED, FOR SOLUTION INTRAVENOUS at 16:52

## 2021-05-19 RX ADMIN — OMEPRAZOLE 40 MG: 20 CAPSULE, DELAYED RELEASE ORAL at 16:42

## 2021-05-19 RX ADMIN — SODIUM CHLORIDE 50 ML: 9 INJECTION, SOLUTION INTRAVENOUS at 16:53

## 2021-05-19 RX ADMIN — DEXAMETHASONE SODIUM PHOSPHATE 6 MG: 4 INJECTION, SOLUTION INTRAMUSCULAR; INTRAVENOUS at 22:59

## 2021-05-19 RX ADMIN — BENZONATATE 100 MG: 100 CAPSULE ORAL at 17:03

## 2021-05-19 RX ADMIN — Medication 1 LOZENGE: at 19:57

## 2021-05-19 RX ADMIN — TAMSULOSIN HYDROCHLORIDE 0.4 MG: 0.4 CAPSULE ORAL at 09:19

## 2021-05-19 RX ADMIN — OXYCODONE HYDROCHLORIDE 5 MG: 5 TABLET ORAL at 13:34

## 2021-05-19 RX ADMIN — Medication 1 LOZENGE: at 17:49

## 2021-05-19 RX ADMIN — Medication 1 LOZENGE: at 00:53

## 2021-05-19 RX ADMIN — Medication 1 LOZENGE: at 09:19

## 2021-05-19 RX ADMIN — Medication 1 LOZENGE: at 16:42

## 2021-05-19 RX ADMIN — Medication 1 LOZENGE: at 21:51

## 2021-05-19 RX ADMIN — ENOXAPARIN SODIUM 40 MG: 40 INJECTION SUBCUTANEOUS at 14:20

## 2021-05-19 RX ADMIN — BENZONATATE 100 MG: 100 CAPSULE ORAL at 09:19

## 2021-05-19 RX ADMIN — METOCLOPRAMIDE 10 MG: 10 TABLET ORAL at 09:33

## 2021-05-19 NOTE — PLAN OF CARE
"Pt alert and oriented x4. He has had headache pains today. Given benadryl, Reglan, ibuprofen and oxycodone for pain. At 1230 pt got up to shower and took his O2 off. He was feeling very SOB and BP was 93/58. Provider notified. Order for high flow put in and respiratory was paged. Pt was was put on 7L O2 oxymask and he is slowly recovering. Now  On 5L O2 oxymask and sats are 98%.    Pt up indep. Headache now improved. Regular diet - tolerating food ok. SL. Receiving decadron, remdesivir and lovenox. Still has dry cough - using lozenges and tessalon. Will cont supportive cares.    /84 (BP Location: Left arm)   Pulse 82   Temp 96.9  F (36.1  C) (Oral)   Resp 20   Ht 1.778 m (5' 10\")   Wt 77.5 kg (170 lb 14.4 oz)   SpO2 (!) 6%   BMI 24.52 kg/m        "

## 2021-05-19 NOTE — PLAN OF CARE
"Temp: (P) 98.3  F (36.8  C) Temp src: (P) Oral BP: (P) 105/71 Pulse: (P) 81   Resp: (P) 18 SpO2: (P) 94 % O2 Device: (P) Nasal cannula Oxygen Delivery: (P) 2 LPM   O2 increased to 2L as his oximeter machine was beeping often. Pt up Independently. Writer in room x1 when pt up to the bathroom and his O2 sats were in the low 90s when pt got back into bed. Tearful this evening- he misses his family. Pt on Remdesivir and decadron. Pt stated his cough isn't productive until after he has the remdesivir, he stated he coughs up \"dark stuff\". He also stated the lozenges really help. Complained of a bad headache, prn benadryl and reglan given with relief. Plan- wean O2 if able, remdesivir every 24 hours, decadron every 24 hours.        "

## 2021-05-19 NOTE — PLAN OF CARE
2300 - 0700    Pt A&Ox4, VSS on 2L O2. Denies pain. Reporting SOB w/ activity. Frequent nonproductive cough, throat lozenge offered and given. Up ad shree in room. Notably, pt removed O2 when getting up to bathroom, upon return to bed, O2 sats assessed before replacing O2 NC, pulse oximetry read 100%. After a two minutes, (still without O2 on) O2 sats down to 87-88%. Otherwise pt denies complaints. IS use encouraged, pt reports using it as instructed while awake. PIV SL. Will continue to monitor.

## 2021-05-19 NOTE — PROGRESS NOTES
[facility]  Hospitalist Progress Note  Maria Mustafa MD 05/19/21  Text Page  Pager: [phone] (7am-6pm)    Reason for Stay (Diagnosis): COVID-19         Assessment and Plan:      Summary of Stay: Pawel Vallejo is a 55-year-old male with a history of BPH who was admitted on 5/16 with acute hypoxic respiratory failure secondary to COVID-19 pneumonia. Still requiring 2L O2, slowly improving.    Problem List/Assessment and Plan:     Acute hypoxic respiratory failure due to COVID 19 pneumonia    - symptoms started 8 days prior to the date of admission with COVID + test on the same day (5/8)    - was in the ED on 5/12 and discharged home    - CT in ED: ground glass opacities    - started on decadron and remdesivir (Day 4/5)    - on Lovenox for DVT prophylaxis    - incentive spirometry    - Continue to require supplemental oxygen at 2L    - Gets hypoxic with exertion and feels SOB, slowly improving    Addendum: Patient took a shower without oxygen and was subsequently very dyspneic with tachypnea and worsening hypoxia.  Required 7L via oxymask to recover.  No need for repeat imaging or lab tests at this point as I suspect his decompensation was due to increased activity without oxygen.     BPH    - cont Flomax    Diet: Regular Diet Adult    DVT Prophylaxis: Enoxaparin (Lovenox) SQ  Valente Catheter: not present  Code Status: Full Code      Disposition Plan   Expected discharge: 1-2 days, recommended to prior living arrangement once improved respiratory symptoms.  Entered: Maria Mustafa MD 05/19/2021, 10:05 AM       The patient's care was discussed with the Bedside Nurse and Patient. Patient's bedside nurse updated his wife this morning.    Hospitalist Service  [facility]          Interval History (Subjective):      Patient was discussed with his bedside nurse.  He states he is feeling slightly better each day.  No SOB or CP at rest.  Gets a pressure in his upper chest with  "SOB when exerting himself even around his room.  Still with a dry cough.  No nausea, emesis, abdominal pain.  Eating ok.  Hoping to be able to leave this Friday.                  Physical Exam:      Last Vital Signs:  /78 (BP Location: Left arm)   Pulse 68   Temp 96.6  F (35.9  C) (Oral)   Resp 20   Ht 1.778 m (5' 10\")   Wt 77.5 kg (170 lb 14.4 oz)   SpO2 94%   BMI 24.52 kg/m      General: Alert, awake, no acute distress.  HEENT: Normocephalic and atraumatic, eyes anicteric and without scleral injection, EOMI, face symmetric, MMM.  Cardiac: RRR, normal S1, S2. No m/g/r, no LE edema.  Pulmonary: Normal chest rise, normal work of breathing.  Lungs CTAB without crackles or wheezing.  Abdomen: soft, non-tender, non-distended.  Normoactive bowel sounds, no guarding or rebound tenderness.  Extremities: no deformities.  Warm, well perfused.  Skin: no rashes or lesions.  Warm and Dry.  Neuro: No focal deficits.  Speech clear.  Coordination and strength grossly normal.  Psych: Alert and oriented x3. Appropriate affect.         Medications:      All current medications were reviewed with changes reflected in problem list.         Data:      All new lab and imaging data was reviewed.   Labs:  Recent Labs   Lab 05/19/21 0623 05/16/21 0959 05/12/21 1946   WBC  --  8.4 6.5   HGB  --  13.6 14.6   HCT  --  40.8 43.8   MCV  --  93 91    226 179     Recent Labs   Lab 05/19/21 0623 05/16/21 0959 05/12/21 1946   NA  --  139 132*   POTASSIUM  --  4.0 3.9   CHLORIDE  --  107 103   CO2  --  27 23   ANIONGAP  --  5 6   GLC  --  115* 90   BUN  --  11 9   CR  --  0.76 0.88   GFRESTIMATED  --  >90 >90   GFRESTBLACK  --  >90 >90   RASHI  --  8.1* 8.3*   PROTTOTAL 7.0 6.7* 7.2   ALBUMIN 2.5* 2.8* 3.4   BILITOTAL 0.3 0.2 0.5   ALKPHOS 112 85 84   AST 75* 34 25   * 50 36      Imaging:   No results found for this or any previous visit (from the past 24 hour(s)).    Maria Mustafa MD          "

## 2021-05-19 NOTE — PROGRESS NOTES
"RT- Called to assess patient for HFNC. Patient on 5L NC. Recently walked back from the bathroom and reported having a \"coughing fit\". RR 20, SpO2 90-91%, in no distress. Patient placed on 7L oxymask. SpO2 93-94% on oxymask.     Jonatan Ascencio, RT on 5/19/2021 at 1:23 PM    "

## 2021-05-20 LAB
ALBUMIN SERPL-MCNC: 2.4 G/DL (ref 3.4–5)
ALP SERPL-CCNC: 119 U/L (ref 40–150)
ALT SERPL W P-5'-P-CCNC: 287 U/L (ref 0–70)
AST SERPL W P-5'-P-CCNC: 148 U/L (ref 0–45)
BILIRUB DIRECT SERPL-MCNC: <0.1 MG/DL (ref 0–0.2)
BILIRUB SERPL-MCNC: 0.4 MG/DL (ref 0.2–1.3)
PROT SERPL-MCNC: 6.7 G/DL (ref 6.8–8.8)

## 2021-05-20 PROCEDURE — 250N000013 HC RX MED GY IP 250 OP 250 PS 637: Performed by: HOSPITALIST

## 2021-05-20 PROCEDURE — 99233 SBSQ HOSP IP/OBS HIGH 50: CPT | Performed by: HOSPITALIST

## 2021-05-20 PROCEDURE — 120N000004 HC R&B MS OVERFLOW

## 2021-05-20 PROCEDURE — 250N000013 HC RX MED GY IP 250 OP 250 PS 637: Performed by: INTERNAL MEDICINE

## 2021-05-20 PROCEDURE — 80076 HEPATIC FUNCTION PANEL: CPT | Performed by: INTERNAL MEDICINE

## 2021-05-20 PROCEDURE — 250N000011 HC RX IP 250 OP 636: Performed by: INTERNAL MEDICINE

## 2021-05-20 PROCEDURE — 36415 COLL VENOUS BLD VENIPUNCTURE: CPT | Performed by: INTERNAL MEDICINE

## 2021-05-20 PROCEDURE — 250N000009 HC RX 250: Performed by: HOSPITALIST

## 2021-05-20 PROCEDURE — 250N000011 HC RX IP 250 OP 636: Performed by: HOSPITALIST

## 2021-05-20 PROCEDURE — 258N000003 HC RX IP 258 OP 636: Performed by: HOSPITALIST

## 2021-05-20 RX ORDER — MAGNESIUM HYDROXIDE/ALUMINUM HYDROXICE/SIMETHICONE 120; 1200; 1200 MG/30ML; MG/30ML; MG/30ML
30 SUSPENSION ORAL EVERY 4 HOURS PRN
Status: DISCONTINUED | OUTPATIENT
Start: 2021-05-20 | End: 2021-05-27 | Stop reason: HOSPADM

## 2021-05-20 RX ADMIN — BENZONATATE 100 MG: 100 CAPSULE ORAL at 08:40

## 2021-05-20 RX ADMIN — TAMSULOSIN HYDROCHLORIDE 0.4 MG: 0.4 CAPSULE ORAL at 08:00

## 2021-05-20 RX ADMIN — Medication 1 LOZENGE: at 17:27

## 2021-05-20 RX ADMIN — BENZONATATE 100 MG: 100 CAPSULE ORAL at 19:39

## 2021-05-20 RX ADMIN — Medication 1 LOZENGE: at 15:25

## 2021-05-20 RX ADMIN — Medication 1 LOZENGE: at 19:39

## 2021-05-20 RX ADMIN — REMDESIVIR 100 MG: 100 INJECTION, POWDER, LYOPHILIZED, FOR SOLUTION INTRAVENOUS at 17:16

## 2021-05-20 RX ADMIN — ENOXAPARIN SODIUM 40 MG: 40 INJECTION SUBCUTANEOUS at 15:25

## 2021-05-20 RX ADMIN — Medication 1 LOZENGE: at 22:19

## 2021-05-20 RX ADMIN — ALUMINUM HYDROXIDE, MAGNESIUM HYDROXIDE, AND SIMETHICONE 30 ML: 200; 200; 20 SUSPENSION ORAL at 13:38

## 2021-05-20 RX ADMIN — Medication 1 LOZENGE: at 08:40

## 2021-05-20 RX ADMIN — DEXAMETHASONE SODIUM PHOSPHATE 6 MG: 4 INJECTION, SOLUTION INTRAMUSCULAR; INTRAVENOUS at 22:19

## 2021-05-20 RX ADMIN — SODIUM CHLORIDE 50 ML: 9 INJECTION, SOLUTION INTRAVENOUS at 17:21

## 2021-05-20 RX ADMIN — OMEPRAZOLE 40 MG: 20 CAPSULE, DELAYED RELEASE ORAL at 06:47

## 2021-05-20 ASSESSMENT — ACTIVITIES OF DAILY LIVING (ADL)
FALL_HISTORY_WITHIN_LAST_SIX_MONTHS: NO
DIFFICULTY_COMMUNICATING: NO
DIFFICULTY_EATING/SWALLOWING: NO
DRESSING/BATHING_DIFFICULTY: NO
TOILETING_ISSUES: NO

## 2021-05-20 NOTE — PROGRESS NOTES
"SPIRITUAL HEALTH SERVICES: Tele-Encounter  Patient Location: Obs 2nd floor  Spoke with: Pt, \"Gustavo\"    Referral Source: Pt/family request    Summary: Pt, \"Gustavo\", admitted with COVID. Provided emotional support through reflective conversation and spiritual support, per pt's invitation, through prayer. Pt's primary stressors are worry about his family (spouse, 6 kids, 14 grandchildren) and offered normalization/validation of feelings and affirmation of his love. Core support is through family connections and his Islam cherie. Will f/u for  Emotional/spiritual check-ins 1-2x/wk during hospitalization.      Illness Narrative - Pt \"Gustavo\" shares that he was \"waiting to get the vaccine, I wanted to see some 6 month data\" and then developed COVID. Hospitalized with increased SOB. Now on 2 LNC and independent in his room.        Distress - Pt shares the following stressors:  ? \"I'm worried about my family, how I have put them at risk.\" Pt notes that he has concern both about COVID transmission as well as if he were to die or be disabled related to his illness.   ? Expresses regret about not having gotten vaccinated.       Coping - Pt names his spouse, Ann, his 6 children, and 14 grandchildren as core to his support. He also names his Islam cherie and is a member at [employer].        Meaning Making - Pt reflected on his strong cherie and love of family. 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      Plan:  Will f/u 1-2x wk for emotional/spirtual support and assess for ongoing needs. SH remains available.     YOHANNES Lerma.  Staff    Pager #[phone]   Pronouns: he/him/his  ______________________________    Type of service:  Telephone Visit     has received verbal consent for a TelephoneVisit from the patient? Yes    Distance Provider Location: designated [facility]" office or home office (secure setting)    Mode of Communication: telephone (via [employer] phone or [employer] tele-call-number ([phone]))

## 2021-05-20 NOTE — PLAN OF CARE
Patient is alert and oriented times three. VSS, on 2L. Pt states cough is no longer productive this shift. Up independently. Pt resting comfortably this shift. Denies pain.

## 2021-05-20 NOTE — PLAN OF CARE
Temp: 97.1  F (36.2  C) Temp src: Oral BP: 118/71 Pulse: 82   Resp: 12 SpO2: 94 % O2 Device: Nasal cannula Oxygen Delivery: 2 LPM     Pt up Independently. Weaned back down to 2L via nasal cannula. Denies pain. Regular diet- eating well. Spiritual health consult placed today, pts wife called the unit very upset that pt was not seen today by a . Pts wife called the unit a 3rd time asking to speak to this writer, writer was with a pt and unable to take her call so writer asked support staff to take her number down so writer could call her back. Writer called home phone number and no answer, writer also tried calling her cell phone however the call wouldn't go through. Pts wife also called at the beginning of writers shift stating we need to change her husbands medications and she doesn't understand why she is not allowed to visit her , writer spoke with provider about this phone call and no changes are going to made to his plan of care. Writer did update the pt about his wifes concerns. Multiple lozenges given as pt stated these really help. Tessalon kenji given x1 for cough to be proactive. Plan- remdesivir, decadron, wean O2 as able.

## 2021-05-20 NOTE — PLAN OF CARE
Patient alert and oriented x4. Vitals stable on 2 LPM NC. Denies pain. Reports dyspnea on exertion; worse with ambulation. Reported new tingling in left foot, resolved without intervention- provider aware. Up independently in room. Tessalon pearls and lozenge given for cough. Continuing with Remdesivir and Decadron. Continuing with supportive cares and symptom management.

## 2021-05-20 NOTE — PROGRESS NOTES
"[facility]    Medicine Progress Note - Hospitalist Service       Date of Admission:  5/16/2021    Assessment & Plan   Pawel Vallejo is a 55-year-old male with a history of BPH admitted on 5/16 to the hospital service with acute hypoxic respiratory failure secondary to COVID-19 pneumonia. Still requiring 2L O2, day 5/5 remdesivir.    Acute hypoxic respiratory failure due to covid 19 pneumonia    - symptoms started 8 days prior to the date of admission with covid + test on the same day (5/8)    - was in the ED on 5/12 and discharged home    - CT in ED: ground glass opacities    - started on decadron (5/10) and remdesivir (Day 5/5)    - on lovenox for DVT prophylaxis    - incentive spirometry    - indicated he should self-prone    BPH    - cont flomax    Called wife and updated her    Diet: Regular  DVT Prophylaxis: Enoxaparin (Lovenox) SQ  Malnutrition: No  Valente Catheter: No  Code Status: Full Code     Disposition Plan   Expected discharge: 1-3 days    Aramis Hunter MD  Hospitalist Service  [facility]  ______________________________________________________________________    Interval History   Patient better today. Slept well. No abdo pain, n/v/d. Having some reflux- discussed with him to get oob for meals, walk around a little.    Data reviewed today: I reviewed all medications, new labs and imaging results over the last 24 hours.     Physical Exam   /82 (BP Location: Left arm)   Pulse 81   Temp 97  F (36.1  C) (Oral)   Resp 16   Ht 1.778 m (5' 10\")   Wt 77.5 kg (170 lb 14.4 oz)   SpO2 92%   BMI 24.52 kg/m    170 lbs 14.4 oz       General: Coughing occasionally.    HEENT: No scleral icterus. Oropharynx moist.     Neck: Supple. Normal range of motion.     Pulmonary: Normal work of breathing. Lungs are clear bilaterally    Cardiovascular: Regular rate and rhythm without murmur or extra heart sounds.    Abdomen: Soft and non-tender.    Extremities: No peripheral edema. No " clubbing or cyanosis.     Neurologic: Awake, alert, appropriate.    Skin: Warm and dry.    Psychiatric: Normal affect and mood.     Data        Medications      Current Facility-Administered Medications   Medication Dose Route Frequency     remdesivir  100 mg Intravenous Q24H    And     sodium chloride 0.9%  50 mL Intravenous Q24H     dexamethasone  6 mg Intravenous Q24H     enoxaparin ANTICOAGULANT  40 mg Subcutaneous Q24H     omeprazole  40 mg Oral QAM AC     tamsulosin  0.4 mg Oral Daily

## 2021-05-21 LAB
ALBUMIN SERPL-MCNC: 2.4 G/DL (ref 3.4–5)
ALP SERPL-CCNC: 122 U/L (ref 40–150)
ALT SERPL W P-5'-P-CCNC: 331 U/L (ref 0–70)
AST SERPL W P-5'-P-CCNC: 126 U/L (ref 0–45)
BILIRUB DIRECT SERPL-MCNC: <0.1 MG/DL (ref 0–0.2)
BILIRUB SERPL-MCNC: 0.5 MG/DL (ref 0.2–1.3)
PROT SERPL-MCNC: 6.5 G/DL (ref 6.8–8.8)

## 2021-05-21 PROCEDURE — 120N000004 HC R&B MS OVERFLOW

## 2021-05-21 PROCEDURE — 36415 COLL VENOUS BLD VENIPUNCTURE: CPT | Performed by: HOSPITALIST

## 2021-05-21 PROCEDURE — 250N000013 HC RX MED GY IP 250 OP 250 PS 637: Performed by: INTERNAL MEDICINE

## 2021-05-21 PROCEDURE — 80076 HEPATIC FUNCTION PANEL: CPT | Performed by: HOSPITALIST

## 2021-05-21 PROCEDURE — 99233 SBSQ HOSP IP/OBS HIGH 50: CPT | Performed by: HOSPITALIST

## 2021-05-21 PROCEDURE — 250N000011 HC RX IP 250 OP 636: Performed by: HOSPITALIST

## 2021-05-21 PROCEDURE — 250N000013 HC RX MED GY IP 250 OP 250 PS 637: Performed by: HOSPITALIST

## 2021-05-21 PROCEDURE — 94640 AIRWAY INHALATION TREATMENT: CPT

## 2021-05-21 PROCEDURE — 999N000156 HC STATISTIC RCP CONSULT EA 30 MIN

## 2021-05-21 PROCEDURE — 250N000011 HC RX IP 250 OP 636: Performed by: INTERNAL MEDICINE

## 2021-05-21 PROCEDURE — 250N000009 HC RX 250: Performed by: HOSPITALIST

## 2021-05-21 PROCEDURE — 999N000157 HC STATISTIC RCP TIME EA 10 MIN

## 2021-05-21 RX ORDER — ALBUTEROL SULFATE 0.83 MG/ML
2.5 SOLUTION RESPIRATORY (INHALATION)
Status: DISCONTINUED | OUTPATIENT
Start: 2021-05-21 | End: 2021-05-21

## 2021-05-21 RX ORDER — ALBUTEROL SULFATE 0.83 MG/ML
2.5 SOLUTION RESPIRATORY (INHALATION)
Status: DISCONTINUED | OUTPATIENT
Start: 2021-05-21 | End: 2021-05-22

## 2021-05-21 RX ORDER — OXYCODONE HYDROCHLORIDE 5 MG/1
5 TABLET ORAL ONCE
Status: COMPLETED | OUTPATIENT
Start: 2021-05-21 | End: 2021-05-21

## 2021-05-21 RX ORDER — ALBUTEROL SULFATE 90 UG/1
2 AEROSOL, METERED RESPIRATORY (INHALATION)
Status: DISCONTINUED | OUTPATIENT
Start: 2021-05-21 | End: 2021-05-27 | Stop reason: HOSPADM

## 2021-05-21 RX ORDER — CALCIUM CARBONATE 500 MG/1
500 TABLET, CHEWABLE ORAL DAILY PRN
Status: DISCONTINUED | OUTPATIENT
Start: 2021-05-21 | End: 2021-05-27 | Stop reason: HOSPADM

## 2021-05-21 RX ADMIN — ALBUTEROL SULFATE 2.5 MG: 2.5 SOLUTION RESPIRATORY (INHALATION) at 12:42

## 2021-05-21 RX ADMIN — ALBUTEROL SULFATE 2 PUFF: 90 AEROSOL, METERED RESPIRATORY (INHALATION) at 17:18

## 2021-05-21 RX ADMIN — OMEPRAZOLE 40 MG: 20 CAPSULE, DELAYED RELEASE ORAL at 06:30

## 2021-05-21 RX ADMIN — OXYCODONE HYDROCHLORIDE 5 MG: 5 TABLET ORAL at 14:54

## 2021-05-21 RX ADMIN — Medication 1 LOZENGE: at 08:20

## 2021-05-21 RX ADMIN — DEXAMETHASONE SODIUM PHOSPHATE 6 MG: 4 INJECTION, SOLUTION INTRAMUSCULAR; INTRAVENOUS at 21:13

## 2021-05-21 RX ADMIN — BENZONATATE 100 MG: 100 CAPSULE ORAL at 08:20

## 2021-05-21 RX ADMIN — ENOXAPARIN SODIUM 40 MG: 40 INJECTION SUBCUTANEOUS at 14:54

## 2021-05-21 RX ADMIN — ALUMINUM HYDROXIDE, MAGNESIUM HYDROXIDE, AND SIMETHICONE 30 ML: 200; 200; 20 SUSPENSION ORAL at 10:20

## 2021-05-21 RX ADMIN — TAMSULOSIN HYDROCHLORIDE 0.4 MG: 0.4 CAPSULE ORAL at 08:20

## 2021-05-21 NOTE — PROGRESS NOTES
"[facility]     Date: 05/21/21  Admission Dx:   COVID 19 virus  Pulmonary History: None  Home Nebulizer/MDI Use: Albuterol MDI Q4 prn  Home Oxygen: None  Acuity Level (RCAT flow sheet): 3  Aerosol Therapy initiated: Albuterol MDI Q6 (per discussion with MD), Albuterol MDI Q4 prn      Pulmonary Hygiene initiated: Coughing techniques      Volume Expansion initiated: IS      Current Oxygen Requirements: 2 LPM NC  Current SpO2: 93%    Re-evaluation date: 05/24/21    Patient Education: Discussed use and benefits of respiratory medications.      See \"RT Assessments\" flow sheet for patient assessment scoring and Acuity Level Details.           "

## 2021-05-21 NOTE — PROGRESS NOTES
Patient is alert and oriented x4. VS WNL and documented on the FS. Lung sounds diminished in the lower posterior lobes. Patient remains 3 liters NC with humidification. Patient states he still feels very SOB with exertion or any movement. At rest sats are >90%. Denies pain. Regular diet. SL. Patient continues on precautions for COVID.

## 2021-05-21 NOTE — PROGRESS NOTES
[facility]    Medicine Progress Note - Hospitalist Service       Date of Admission:  5/16/2021    Assessment & Plan   Pawel Vallejo is a 55-year-old male with a history of BPH admitted on 5/16 to the hospital service with acute hypoxic respiratory failure secondary to COVID-19 pneumonia. Still requiring 2L O2, completed remdesivir.    Acute hypoxic respiratory failure due to covid 19 pneumonia    - symptoms started 8 days prior to the date of admission with covid + test on the same day (5/8)    - was in the ED on 5/12 and discharged home    - CT in ED: ground glass opacities    - started on decadron (6/10) and remdesivir (completed 5 day course on 5/20)    - on lovenox for DVT prophylaxis    - incentive spirometry    - indicated he should self-prone: he is dong this    - will add albuterol nebs TID for comfort- may help with his coughing spasms  Addendum: spoke with respiratory. Would like to see if he has as much relief with inhalers with the goal of avoiding the aerosolized meds. He did get one neb with good relief. Will try scheduled inhaler (albuterol), but change back to TID nebs if he finds they help more. Would need to get a HEPA filter into his room    Acid reflux symptoms    - indicated he should sit for meals and then walk around    - cont home omeprazole    - added Tums and Maalox    Transaminitis    - likely due to underlying viral infection vs remdesivir     - follow    BPH    - cont flomax    Called wife and updated her (PLEASE BE SURE TO CALL WIFE AND UPDATE HER DAILY)    Diet: Regular  DVT Prophylaxis: Enoxaparin (Lovenox) SQ  Malnutrition: No  Valente Catheter: No  Code Status: Full Code     Disposition Plan   Expected discharge: 1-3 days    Aramis Hunter MD  Hospitalist Service  [facility]  ______________________________________________________________________    Interval History   Patient doing ok today. Feels about the same. Still having coughing spasms. No new  "complaints. Still having some reflux symptoms    Data reviewed today: I reviewed all medications, new labs and imaging results over the last 24 hours.     Physical Exam   /79 (BP Location: Left arm)   Pulse 71   Temp 96.6  F (35.9  C) (Oral)   Resp 18   Ht 1.778 m (5' 10\")   Wt 77.5 kg (170 lb 14.4 oz)   SpO2 93%   BMI 24.52 kg/m    170 lbs 14.4 oz       General: Coughing occasionally.    HEENT: No scleral icterus. Oropharynx moist.     Neck: Supple. Normal range of motion.     Pulmonary: Normal work of breathing. Lungs are clear bilaterally    Cardiovascular: Regular rate and rhythm without murmur or extra heart sounds.    Abdomen: Soft and non-tender.    Extremities: No peripheral edema. No clubbing or cyanosis.     Neurologic: Awake, alert, appropriate.    Skin: Warm and dry.    Psychiatric: Normal affect and mood.     Data        Medications      Current Facility-Administered Medications   Medication Dose Route Frequency     albuterol  2.5 mg Nebulization 3 times daily     dexamethasone  6 mg Intravenous Q24H     enoxaparin ANTICOAGULANT  40 mg Subcutaneous Q24H     omeprazole  40 mg Oral QAM AC     tamsulosin  0.4 mg Oral Daily       "

## 2021-05-21 NOTE — PLAN OF CARE
"/79 (BP Location: Left arm)   Pulse 71   Temp 96.6  F (35.9  C) (Oral)   Resp 18   Ht 1.778 m (5' 10\")   Wt 77.5 kg (170 lb 14.4 oz)   SpO2 93%   BMI 24.52 kg/m      Neuro: WNL  Cardiac: WNL    Lungs: Clear to diminished, Freq nonproductive cough PRN tessalon & lozenge givenx1; currently on 2 L - writer suggested weaning Pt from 2 L to 1.5 L, however, Pt protested against \"playing w/ oxygen\" and stating SpO2 would drop as soon as Pt is up.  Writer tried to educate Pt the reasoning of weaning from O2 due to stable Sats.    Update:  Pt showered this afternoon & SpO2 fell to 84%.  Pt SOB & reported dizziness & headache.  Pt was hypotensive 94/53.  Gave pt 5 L at this time for spO2 > 90%.      Pt now on 3 L of O2 via humified nc due to reports of blood clots when blowing nose.  PRN ocean spray avail if needed.      GI: WNL, BM this am  : WNL  Pain: C/o heartburn this am PRN maalox given, gave one time dose of PO 5 mg of oxy for headache.  IV: SL  Meds: Decadron daily & Lovenox  Labs/tests: ,   Diet: Reg diet, good appetite  Activity: Ind  Plan: Decadron + Lovenox, O2 for spo2> 92%; Will cont to monitor & provide cares.      "

## 2021-05-21 NOTE — PLAN OF CARE
Assumed care from 1900 to 0730  COVID+, competed 5 days remdesivir  A&Ox4, up ad shree  LDA: PIV SL  Vitals: stable, 2L/NC  Freq cough w/movement  Pt frustrated, quickly dismisses questions about SOB & oxygen needs  Denies pain  GI/: Voiding, LBM 5/19  Plan: Decadron, lovenox, wean O2 as able  Will continue to monitor

## 2021-05-21 NOTE — CONSULTS
"CLINICAL NUTRITION SERVICES  -  ASSESSMENT NOTE      Malnutrition Diagnosis: Unable to determine without nutrition/wt hx or NFPE          REASON FOR ASSESSMENT  Pawel Vallejo is a 55 year old male seen by Registered Dietitian for LOS.    PMH of: No nutritionally pertinent.    Admit 2/2: COVID-PNA.    NUTRITION HISTORY  - Information obtained from chart.  Multiple attempts to call into room without success (per direction of department guidelines for COVID+ patients).   - Allergies: NKFA.      CURRENT NUTRITION ORDERS  Diet Order:     Regular    Current Intake/Tolerance:  Only flowsheet recording is 75% of 1 meal.  Meal system review indicates patient has been ordering meals consistently, TID, since admission.  Variable O2 needs since admission up to HFNC a few days ago, currently weaned down to 2 L NC.      NUTRITION FOCUSED PHYSICAL ASSESSMENT FOR DIAGNOSING MALNUTRITION)  No    Obtained from Chart/Interdisciplinary Team:  - No documentation of PI  - Stooling patterns reviewed  - Currently requiring 2 L NC    ANTHROPOMETRICS  Height: 5' 10\"  Weight: 170 lbs 14.4 oz  Body mass index is 24.52 kg/m .  Weight Status:  Normal BMI  Weight History:  Wt Readings from Last 10 Encounters:   05/16/21 77.5 kg (170 lb 14.4 oz)   02/09/21 77.6 kg (171 lb)   12/31/20 77.8 kg (171 lb 8.3 oz)   12/21/20 79.4 kg (175 lb)   02/03/20 77.1 kg (170 lb)   09/07/14 82.3 kg (181 lb 8 oz)   12/10/12 83.5 kg (184 lb)   11/30/12 82.1 kg (181 lb)     - No wt loss based on above.    LABS  Labs reviewed:  Electrolytes  Potassium (mmol/L)   Date Value   05/16/2021 4.0   05/12/2021 3.9   12/21/2020 4.1    Blood Glucose  Glucose (mg/dL)   Date Value   05/16/2021 115 (H)   05/12/2021 90   12/21/2020 92   02/03/2020 83   09/06/2014 89    Inflammatory Markers  CRP Inflammation (mg/L)   Date Value   05/18/2021 15.0 (H)   05/17/2021 21.5 (H)     WBC (10e9/L)   Date Value   05/16/2021 8.4   05/12/2021 6.5   12/21/2020 7.9     Albumin (g/dL)   Date " Value   05/20/2021 2.4 (L)   05/19/2021 2.5 (L)   05/16/2021 2.8 (L)      Sodium (mmol/L)   Date Value   05/16/2021 139   05/12/2021 132 (L)   12/21/2020 141    Renal  Urea Nitrogen (mg/dL)   Date Value   05/16/2021 11   05/12/2021 9   12/21/2020 12     Creatinine (mg/dL)   Date Value   05/16/2021 0.76   05/12/2021 0.88   12/21/2020 0.85     Additional  No results found for: TRIG, URINEKETONE     B/P: 128/83, T: 97.2, P: 67, R: 18      MEDICATIONS  Medications reviewed:    dexamethasone  6 mg Intravenous Q24H     enoxaparin ANTICOAGULANT  40 mg Subcutaneous Q24H     omeprazole  40 mg Oral QAM AC     tamsulosin  0.4 mg Oral Daily             ASSESSED NUTRITION NEEDS PER APPROVED PRACTICE GUIDELINES:    Dosing Weight 78 kg   Estimated Energy Needs: 25-30 Kcal/Kg  Justification: maintenance with COVID+  Estimated Protein Needs: 1-1.2+ g pro/Kg  Justification: minimum for preservation of lean body mass with COVID+  Estimated Fluid Needs: per MD      NUTRITION DIAGNOSIS:  Predicted inadequate nutrient intake (energy/protein) related to potential for decline in PO intakes during admit pending    NUTRITION INTERVENTIONS  Recommendations / Nutrition Prescription  Continue diet as ordered by MD.  Small/frequent meals as needed, self selection of protein focus.     Ok for prn Ensure.      Implementation  Nutrition education: Unable to provide/connect with patient.    Medical Food Supplement: As above.     Nutrition Goals  Patient to consume at least 75% of meals or supplements TID.       MONITORING AND EVALUATION:  Progress towards goals will be monitored and evaluated per protocol and Practice Guidelines          Griselda Sweeney RDN, LD  Clinical Dietitian  3rd floor/ICU: [phone]  All other floors: [phone]  Weekend/holiday: [phone]

## 2021-05-22 LAB
ALBUMIN SERPL-MCNC: 2.4 G/DL (ref 3.4–5)
ALP SERPL-CCNC: 127 U/L (ref 40–150)
ALT SERPL W P-5'-P-CCNC: 317 U/L (ref 0–70)
AST SERPL W P-5'-P-CCNC: 98 U/L (ref 0–45)
BILIRUB DIRECT SERPL-MCNC: <0.1 MG/DL (ref 0–0.2)
BILIRUB SERPL-MCNC: 0.4 MG/DL (ref 0.2–1.3)
ERYTHROCYTE [DISTWIDTH] IN BLOOD BY AUTOMATED COUNT: 13.1 % (ref 10–15)
HCT VFR BLD AUTO: 41.9 % (ref 40–53)
HGB BLD-MCNC: 14.1 G/DL (ref 13.3–17.7)
MCH RBC QN AUTO: 30.3 PG (ref 26.5–33)
MCHC RBC AUTO-ENTMCNC: 33.7 G/DL (ref 31.5–36.5)
MCV RBC AUTO: 90 FL (ref 78–100)
PLATELET # BLD AUTO: 292 10E9/L (ref 150–450)
PLATELET # BLD AUTO: 300 10E9/L (ref 150–450)
PROT SERPL-MCNC: 6.3 G/DL (ref 6.8–8.8)
RBC # BLD AUTO: 4.65 10E12/L (ref 4.4–5.9)
WBC # BLD AUTO: 15.2 10E9/L (ref 4–11)

## 2021-05-22 PROCEDURE — 94640 AIRWAY INHALATION TREATMENT: CPT

## 2021-05-22 PROCEDURE — 250N000013 HC RX MED GY IP 250 OP 250 PS 637: Performed by: HOSPITALIST

## 2021-05-22 PROCEDURE — 120N000004 HC R&B MS OVERFLOW

## 2021-05-22 PROCEDURE — 250N000013 HC RX MED GY IP 250 OP 250 PS 637: Performed by: INTERNAL MEDICINE

## 2021-05-22 PROCEDURE — 999N000157 HC STATISTIC RCP TIME EA 10 MIN

## 2021-05-22 PROCEDURE — 36415 COLL VENOUS BLD VENIPUNCTURE: CPT | Performed by: INTERNAL MEDICINE

## 2021-05-22 PROCEDURE — 80076 HEPATIC FUNCTION PANEL: CPT | Performed by: INTERNAL MEDICINE

## 2021-05-22 PROCEDURE — 85049 AUTOMATED PLATELET COUNT: CPT | Performed by: INTERNAL MEDICINE

## 2021-05-22 PROCEDURE — 250N000011 HC RX IP 250 OP 636: Performed by: HOSPITALIST

## 2021-05-22 PROCEDURE — 250N000011 HC RX IP 250 OP 636: Performed by: INTERNAL MEDICINE

## 2021-05-22 PROCEDURE — 36415 COLL VENOUS BLD VENIPUNCTURE: CPT | Performed by: HOSPITALIST

## 2021-05-22 PROCEDURE — 99233 SBSQ HOSP IP/OBS HIGH 50: CPT | Performed by: HOSPITALIST

## 2021-05-22 PROCEDURE — 85027 COMPLETE CBC AUTOMATED: CPT | Performed by: HOSPITALIST

## 2021-05-22 RX ORDER — OXYCODONE HYDROCHLORIDE 5 MG/1
5 TABLET ORAL ONCE
Status: COMPLETED | OUTPATIENT
Start: 2021-05-22 | End: 2021-05-22

## 2021-05-22 RX ORDER — PANTOPRAZOLE SODIUM 40 MG/1
40 TABLET, DELAYED RELEASE ORAL
Status: DISCONTINUED | OUTPATIENT
Start: 2021-05-22 | End: 2021-05-27 | Stop reason: HOSPADM

## 2021-05-22 RX ADMIN — OXYCODONE HYDROCHLORIDE 5 MG: 5 TABLET ORAL at 16:41

## 2021-05-22 RX ADMIN — ALBUTEROL SULFATE 2 PUFF: 90 AEROSOL, METERED RESPIRATORY (INHALATION) at 21:15

## 2021-05-22 RX ADMIN — ALUMINUM HYDROXIDE, MAGNESIUM HYDROXIDE, AND SIMETHICONE 30 ML: 200; 200; 20 SUSPENSION ORAL at 08:28

## 2021-05-22 RX ADMIN — ALBUTEROL SULFATE 2 PUFF: 90 AEROSOL, METERED RESPIRATORY (INHALATION) at 16:08

## 2021-05-22 RX ADMIN — ALBUTEROL SULFATE 2 PUFF: 90 AEROSOL, METERED RESPIRATORY (INHALATION) at 06:19

## 2021-05-22 RX ADMIN — OMEPRAZOLE 40 MG: 20 CAPSULE, DELAYED RELEASE ORAL at 06:17

## 2021-05-22 RX ADMIN — TAMSULOSIN HYDROCHLORIDE 0.4 MG: 0.4 CAPSULE ORAL at 08:25

## 2021-05-22 RX ADMIN — ENOXAPARIN SODIUM 40 MG: 40 INJECTION SUBCUTANEOUS at 16:42

## 2021-05-22 RX ADMIN — BENZONATATE 100 MG: 100 CAPSULE ORAL at 08:28

## 2021-05-22 RX ADMIN — DEXAMETHASONE SODIUM PHOSPHATE 6 MG: 4 INJECTION, SOLUTION INTRAMUSCULAR; INTRAVENOUS at 21:16

## 2021-05-22 RX ADMIN — PANTOPRAZOLE SODIUM 40 MG: 40 TABLET, DELAYED RELEASE ORAL at 16:41

## 2021-05-22 NOTE — PROGRESS NOTES
"[facility]    Hospitalist Progress Note             Date of Admission:  5/16/2021                   Day of hospitalization: 5    Assessment and Plan:   Pawel Vallejo is a 55-year-old male with a history of BPH admitted on 5/16 to the hospital service with acute hypoxic respiratory failure secondary to COVID-19 pneumonia. Still requiring 2L O2, day 5/5 remdesivir.     Acute hypoxic respiratory failure due to covid 19 pneumonia    - symptoms started 8 days prior to the date of admission with covid + test on the same day (5/8)    - was in the ED on 5/12 and discharged home    - CT in ED: ground glass opacities    - started on decadron (started 5/16) and completed remdesivir     - on lovenox for DVT prophylaxis    - incentive spirometry    - indicated he should self-prone     Dizziness, borderline blood pressures  - negative orthostatics, cbc normal hemogloibn, monitor    Leukocytosis  - monitor, possibly related to steroids, if worsening and clinically worse may consider further imaging, CXR. Currently per patient symptoms seem to be improving but persists.    BPH    - cont flomax      wife on face time today in room and updated her     Diet: Regular  DVT Prophylaxis: Enoxaparin (Lovenox) SQ  Malnutrition: No  Valente Catheter: No  Code Status: Full Code      Disposition Plan   Expected discharge: unknown             Kimber Crawford MD  Text Page (7am - 6pm, M-F)               Subjective   Chief Complaint: SOB  Subjective: Patient complains of dyspnea on exertion, dizziness with activity as well. States he had dark stools 2 days ago none since, otherwise, also complains of reflux with meals.  No hematochezia, or hematemesis        Objective   /88 (BP Location: Left arm)   Pulse 77   Temp 96.6  F (35.9  C) (Oral)   Resp 18   Ht 1.778 m (5' 10\")   Wt 77.5 kg (170 lb 14.4 oz)   SpO2 92%   BMI 24.52 kg/m       Physical Exam  General: Pt in NAD, normal appearance  HEENT: OP clear MMM, no " JVD  Lungs: Bibasilar crackles normal breathing  without accessory muscle usage, no wheezing, rhonchi or crackles  Cardiac: +S1, S2, RRR, no MRG, no edema  Abdominal: normal bowel sounds, NT/ND  Skin: warm, dry, normal turgor, no rash  Psyche: A& O x3, appropriate affect           No intake or output data in the 24 hours ending 05/22/21 1023        Labs and Imaging Results:      Recent Labs   Lab 05/22/21  0629 05/19/21  0623 05/16/21  0959   WBC  --   --  8.4   HGB  --   --  13.6    327 226        Recent Labs   Lab 05/16/21  0959      CO2 27   BUN 11      No results for input(s): INR, PTT in the last 168 hours.   No results for input(s): CKMB in the last 168 hours.    Invalid input(s): TROPONINT     Recent Labs   Lab 05/22/21  0629 05/21/21  0704   ALBUMIN 2.4* 2.4*   AST 98* 126*   * 331*   ALKPHOS 127 122        Micro:     Radio:  CT Chest Pulmonary Embolism w Contrast   Final Result   IMPRESSION:   1.  Multifocal patchy bilateral irregular consolidation most   consistent with atypical pneumonia such as COVID-19 pneumonia.   2.  Trace bibasilar pleural fluid.   3.  No evidence for pulmonary embolism.      MADELIN DANIEL MD      XR Chest Port 1 View   Final Result   IMPRESSION: Mild hazy increased density in both lower lungs is new   since the previous exam, and could be infectious in etiology. Few tiny   calcified granulomas in both lungs are unchanged. No pneumothorax.   Heart size appears stable. Pulmonary vascularity is within normal   limits.      TICO POLK MD              Medications:      Scheduled Meds:      albuterol  2 puff Inhalation Q6H     dexamethasone  6 mg Intravenous Q24H     enoxaparin ANTICOAGULANT  40 mg Subcutaneous Q24H     pantoprazole  40 mg Oral BID AC     tamsulosin  0.4 mg Oral Daily     Continuous Infusions:    PRN Meds:  acetaminophen, alum & mag hydroxide-simethicone, sore throat lozenge, benzonatate, calcium carbonate, diphenhydrAMINE, ibuprofen, melatonin,  metoclopramide, ondansetron **OR** ondansetron, polyethylene glycol, sodium chloride

## 2021-05-22 NOTE — PLAN OF CARE
"[id number]    Inpatient Progress Note:    /74 (BP Location: Left arm)   Pulse 66   Temp 97.7  F (36.5  C) (Oral)   Resp 18   Ht 1.778 m (5' 10\")   Wt 77.5 kg (170 lb 14.4 oz)   SpO2 95%   BMI 24.52 kg/m       Pt Aox4. VSS. On 3L O2 per NC with humidification satting mid 90's. With ambulation and position changes, sats drop to 87%. Pt takes several minutes to recover back to mid 90's. Pt reports dyspnea with exertion. Lung sounds diminished in bilateral bases. Up independently.  Plan: Continue special precautions, decadron, lovenox, and albuterol inhaler. Monitor respiratory status and maintain O2 saturations > 92%.     Will continue to monitor and provide cares.     Deja Nugent RN        "

## 2021-05-22 NOTE — PLAN OF CARE
Vitals stable. Continues on special isolation precautions. Medicated with oxy for headache. Lungs clear and diminished, using 4l nc and running mid 90's, desats when up to bathroom and after inhaler or IS use, recovering within a few minutes. Watching tv and using his computer and wife bringing him homemade supper tonight.

## 2021-05-22 NOTE — PLAN OF CARE
"/66 (BP Location: Left arm)   Pulse 76   Temp 98.6  F (37  C) (Oral)   Resp 18   Ht 1.778 m (5' 10\")   Wt 77.5 kg (170 lb 14.4 oz)   SpO2 95%   BMI 24.52 kg/m      Neuro: WNL  Cardiac: WNL   Lungs: Clear to dim in bases, SpO2 decreases w/ exertion - takes Pt a while to recover, currently on 4 L.  Freq dry nonproductive cough PRN tessalon given.  GI: WNL  : WNL  Pain:  C/o heartburn & headache, PRN maalox given for heartburn, Pt requested oxy for headache - Pt states most effective for headache  IV: SL  Meds: see MAR  Labs/tests: Elevated LFTs, WBC 15.2  Diet: Reg - good appetite  Activity: Ind  Plan: Decadron, Lovenox, PPI started, spO2> 92%; Will cont to monitor & provide cares.      "

## 2021-05-23 LAB
CRP SERPL-MCNC: 6.8 MG/L (ref 0–8)
D DIMER PPP FEU-MCNC: 0.4 UG/ML FEU (ref 0–0.5)

## 2021-05-23 PROCEDURE — 250N000013 HC RX MED GY IP 250 OP 250 PS 637: Performed by: INTERNAL MEDICINE

## 2021-05-23 PROCEDURE — 120N000004 HC R&B MS OVERFLOW

## 2021-05-23 PROCEDURE — 250N000011 HC RX IP 250 OP 636: Performed by: HOSPITALIST

## 2021-05-23 PROCEDURE — 85379 FIBRIN DEGRADATION QUANT: CPT | Performed by: INTERNAL MEDICINE

## 2021-05-23 PROCEDURE — 86140 C-REACTIVE PROTEIN: CPT | Performed by: INTERNAL MEDICINE

## 2021-05-23 PROCEDURE — 36415 COLL VENOUS BLD VENIPUNCTURE: CPT | Performed by: INTERNAL MEDICINE

## 2021-05-23 PROCEDURE — 99233 SBSQ HOSP IP/OBS HIGH 50: CPT | Performed by: INTERNAL MEDICINE

## 2021-05-23 PROCEDURE — 250N000013 HC RX MED GY IP 250 OP 250 PS 637: Performed by: HOSPITALIST

## 2021-05-23 PROCEDURE — 250N000011 HC RX IP 250 OP 636: Performed by: INTERNAL MEDICINE

## 2021-05-23 RX ORDER — NALOXONE HYDROCHLORIDE 0.4 MG/ML
0.4 INJECTION, SOLUTION INTRAMUSCULAR; INTRAVENOUS; SUBCUTANEOUS
Status: DISCONTINUED | OUTPATIENT
Start: 2021-05-23 | End: 2021-05-27 | Stop reason: HOSPADM

## 2021-05-23 RX ORDER — NALOXONE HYDROCHLORIDE 0.4 MG/ML
0.2 INJECTION, SOLUTION INTRAMUSCULAR; INTRAVENOUS; SUBCUTANEOUS
Status: DISCONTINUED | OUTPATIENT
Start: 2021-05-23 | End: 2021-05-27 | Stop reason: HOSPADM

## 2021-05-23 RX ORDER — OXYCODONE HYDROCHLORIDE 5 MG/1
5 TABLET ORAL EVERY 6 HOURS PRN
Status: DISCONTINUED | OUTPATIENT
Start: 2021-05-23 | End: 2021-05-27 | Stop reason: HOSPADM

## 2021-05-23 RX ORDER — FAMOTIDINE 20 MG/1
20 TABLET, FILM COATED ORAL 2 TIMES DAILY
Status: COMPLETED | OUTPATIENT
Start: 2021-05-23 | End: 2021-05-24

## 2021-05-23 RX ADMIN — SALINE NASAL SPRAY 1 SPRAY: 1.5 SOLUTION NASAL at 20:16

## 2021-05-23 RX ADMIN — PANTOPRAZOLE SODIUM 40 MG: 40 TABLET, DELAYED RELEASE ORAL at 17:27

## 2021-05-23 RX ADMIN — ALBUTEROL SULFATE 2 PUFF: 90 AEROSOL, METERED RESPIRATORY (INHALATION) at 07:53

## 2021-05-23 RX ADMIN — OXYCODONE HYDROCHLORIDE 5 MG: 5 TABLET ORAL at 12:19

## 2021-05-23 RX ADMIN — PANTOPRAZOLE SODIUM 40 MG: 40 TABLET, DELAYED RELEASE ORAL at 07:52

## 2021-05-23 RX ADMIN — ENOXAPARIN SODIUM 40 MG: 40 INJECTION SUBCUTANEOUS at 15:00

## 2021-05-23 RX ADMIN — SALINE NASAL SPRAY 1 SPRAY: 1.5 SOLUTION NASAL at 14:58

## 2021-05-23 RX ADMIN — ALBUTEROL SULFATE 2 PUFF: 90 AEROSOL, METERED RESPIRATORY (INHALATION) at 15:00

## 2021-05-23 RX ADMIN — TAMSULOSIN HYDROCHLORIDE 0.4 MG: 0.4 CAPSULE ORAL at 07:52

## 2021-05-23 RX ADMIN — FAMOTIDINE 20 MG: 20 TABLET ORAL at 20:14

## 2021-05-23 RX ADMIN — FAMOTIDINE 20 MG: 20 TABLET ORAL at 11:36

## 2021-05-23 RX ADMIN — DEXAMETHASONE SODIUM PHOSPHATE 6 MG: 4 INJECTION, SOLUTION INTRAMUSCULAR; INTRAVENOUS at 21:35

## 2021-05-23 RX ADMIN — ALBUTEROL SULFATE 2 PUFF: 90 AEROSOL, METERED RESPIRATORY (INHALATION) at 03:17

## 2021-05-23 RX ADMIN — ALBUTEROL SULFATE 2 PUFF: 90 AEROSOL, METERED RESPIRATORY (INHALATION) at 20:15

## 2021-05-23 NOTE — PLAN OF CARE
"[id number]    Inpatient Progress Note:    /60 (BP Location: Left arm)   Pulse 82   Temp 97.4  F (36.3  C) (Oral)   Resp 18   Ht 1.778 m (5' 10\")   Wt 77.5 kg (170 lb 14.4 oz)   SpO2 95%   BMI 24.52 kg/m       Orientation: alert and oriented x4   Pain status: denies  Activity: up independently in room  Peripheral edema: none  Resp: dyspnea on exertion, dry cough (patient reports improvement, no medication needed), 3 L O2 via NC, LS diminished   Cardiac: WDL  GI: WDL  :  WDL  Skin: WDL  LDA: peripheral IV  Infusions: SL  Pertinent Labs: , AST 98, WBC 15.2  Diet: regular  Consults: --  Discharge Plan: continue decadron, lovenox, albuterol inhaler (spacer added per patient request), wean O2     Will continue to monitor and provide cares.     Monse Leo RN         "

## 2021-05-23 NOTE — PLAN OF CARE
"/75 (BP Location: Right arm)   Pulse 80   Temp 96.9  F (36.1  C) (Oral)   Resp 20   Ht 1.778 m (5' 10\")   Wt 77.5 kg (170 lb 14.4 oz)   SpO2 91%   BMI 24.52 kg/m      Neuro: WNL  Cardiac: WNL  Lungs: Dimished, freq nonproductive/productive cough,  LOPEZ, nasal passages are dry & reported blood clots - wanted to try nasal mask currently on 7 L O2 mask PRN ocean spray given  GI: WNL  : WNL  Pain: C/o headache - PRN oxy givenx1  IV: SL  Meds: Decadon, Lovenox, PPI, scheduled albuterol  Labs/tests: WBC 15.2, CRP 6.8, D Dimer 0.4  Diet: Reg - good appetite  Activity: Ind  Plan: spO2 > 92%, IS, med regime, recheck am labs, Will cont to monitor & provide cares.      "

## 2021-05-23 NOTE — PLAN OF CARE
Continues with isolation. Up independently in room. LOPEZ and desats to upper 80's for few minutes after going to bathroom, eating or taking inhaler and then recovers to mid 90's. Continues to wear oxygen at 4l nc.

## 2021-05-23 NOTE — PROGRESS NOTES
[facility]    Hospitalist Progress Note             Date of Admission:  5/16/2021                   Day of hospitalization: 7    Assessment and Plan:   Pawel Vallejo is a 55-year-old male with a history of BPH admitted on 5/16 to the hospital service with acute hypoxic respiratory failure secondary to COVID-19 pneumonia. Still requiring 2L O2.  Completed remdesivir continues on Decadron..     Acute hypoxic respiratory failure due to covid 19 pneumonia    - symptoms started 8 days prior to the date of admission with covid + test on the same day (5/8)    - was in the ED on 5/12 and discharged home    - CT in ED: ground glass opacities    - started on decadron (started 5/16) and completed remdesivir     - on lovenox for DVT prophylaxis    - incentive spirometry    - indicated he should self-prone    -added pepcid to help with ongoing dry cough     Dizziness, borderline blood pressures: Improved  - negative orthostatics, cbc normal hemogloibn, monitor    Leukocytosis  - monitor, possibly related to steroids, if worsening and clinically worse may consider further imaging, CXR. Currently per patient symptoms seem to be improving but persists.    BPH    - cont flomax    Nosebleeds from dry oxygen  -Humidified oxygen  -Ocean mist    Discussed with wife over the phone and updated her.     Diet: Regular  DVT Prophylaxis: Enoxaparin (Lovenox) SQ  Malnutrition: No  Valente Catheter: No  Code Status: Full Code      Disposition Plan   Expected discharge: unknown             Radha Ryan MD            Total Time >35 min. D/w wife   Subjective   Assumed care reviewed chart , patient is anxious and is still requiring supplemental O2.  Is trying to do his work while in the hospital.  Denies any chest pain pressure heaviness or tightness.  10 point review of system was otherwise negative.  Complains of generalized malaise and weakness.  Had detailed conversation with patient's wife and updated  "her.       Objective   /75 (BP Location: Left arm)   Pulse 72   Temp 96.2  F (35.7  C) (Oral)   Resp 15   Ht 1.778 m (5' 10\")   Wt 77.5 kg (170 lb 14.4 oz)   SpO2 94%   BMI 24.52 kg/m       Physical Exam  General: Pt in NAD, normal appearance  HEENT: OP clear MMM, no JVD  Lungs: Bibasilar crackles normal breathing  without accessory muscle usage, no wheezing, rhonchi or crackles  Cardiac: +S1, S2, RRR, no MRG, no edema  Abdominal: normal bowel sounds, NT/ND  Skin: warm, dry, normal turgor, no rash  Psyche: A& O x3, appropriate affect                   Labs and Imaging Results:      Recent Labs   Lab 05/22/21  1049 05/22/21  0629 05/16/21  0959 05/16/21  0959   WBC 15.2*  --   --  8.4   HGB 14.1  --   --  13.6    300   < > 226    < > = values in this interval not displayed.        Recent Labs   Lab 05/16/21  0959      CO2 27   BUN 11      No results for input(s): INR, PTT in the last 168 hours.   No results for input(s): CKMB in the last 168 hours.    Invalid input(s): TROPONINT     Recent Labs   Lab 05/22/21  0629 05/21/21  0704   ALBUMIN 2.4* 2.4*   AST 98* 126*   * 331*   ALKPHOS 127 122        Micro:     Radio:  CT Chest Pulmonary Embolism w Contrast   Final Result   IMPRESSION:   1.  Multifocal patchy bilateral irregular consolidation most   consistent with atypical pneumonia such as COVID-19 pneumonia.   2.  Trace bibasilar pleural fluid.   3.  No evidence for pulmonary embolism.      MADELIN DANIEL MD      XR Chest Port 1 View   Final Result   IMPRESSION: Mild hazy increased density in both lower lungs is new   since the previous exam, and could be infectious in etiology. Few tiny   calcified granulomas in both lungs are unchanged. No pneumothorax.   Heart size appears stable. Pulmonary vascularity is within normal   limits.      TICO POLK MD              Medications:      Scheduled Meds:      albuterol  2 puff Inhalation Q6H     dexamethasone  6 mg Intravenous Q24H     " enoxaparin ANTICOAGULANT  40 mg Subcutaneous Q24H     pantoprazole  40 mg Oral BID AC     tamsulosin  0.4 mg Oral Daily     Continuous Infusions:    PRN Meds:  acetaminophen, alum & mag hydroxide-simethicone, sore throat lozenge, benzonatate, calcium carbonate, diphenhydrAMINE, ibuprofen, melatonin, metoclopramide, ondansetron **OR** ondansetron, polyethylene glycol, sodium chloride

## 2021-05-24 LAB
ANION GAP SERPL CALCULATED.3IONS-SCNC: 5 MMOL/L (ref 3–14)
BUN SERPL-MCNC: 22 MG/DL (ref 7–30)
CALCIUM SERPL-MCNC: 8.6 MG/DL (ref 8.5–10.1)
CHLORIDE SERPL-SCNC: 103 MMOL/L (ref 94–109)
CO2 SERPL-SCNC: 27 MMOL/L (ref 20–32)
CREAT SERPL-MCNC: 0.72 MG/DL (ref 0.66–1.25)
ERYTHROCYTE [DISTWIDTH] IN BLOOD BY AUTOMATED COUNT: 13 % (ref 10–15)
GFR SERPL CREATININE-BSD FRML MDRD: >90 ML/MIN/{1.73_M2}
GLUCOSE SERPL-MCNC: 111 MG/DL (ref 70–99)
HCT VFR BLD AUTO: 44.1 % (ref 40–53)
HGB BLD-MCNC: 14.7 G/DL (ref 13.3–17.7)
MCH RBC QN AUTO: 30.4 PG (ref 26.5–33)
MCHC RBC AUTO-ENTMCNC: 33.3 G/DL (ref 31.5–36.5)
MCV RBC AUTO: 91 FL (ref 78–100)
PLATELET # BLD AUTO: 305 10E9/L (ref 150–450)
POTASSIUM SERPL-SCNC: 4.8 MMOL/L (ref 3.4–5.3)
PROCALCITONIN SERPL-MCNC: <0.05 NG/ML
RBC # BLD AUTO: 4.83 10E12/L (ref 4.4–5.9)
SODIUM SERPL-SCNC: 135 MMOL/L (ref 133–144)
WBC # BLD AUTO: 15.4 10E9/L (ref 4–11)

## 2021-05-24 PROCEDURE — 99233 SBSQ HOSP IP/OBS HIGH 50: CPT | Performed by: INTERNAL MEDICINE

## 2021-05-24 PROCEDURE — 250N000011 HC RX IP 250 OP 636: Performed by: HOSPITALIST

## 2021-05-24 PROCEDURE — 94640 AIRWAY INHALATION TREATMENT: CPT

## 2021-05-24 PROCEDURE — 84145 PROCALCITONIN (PCT): CPT | Performed by: INTERNAL MEDICINE

## 2021-05-24 PROCEDURE — 120N000004 HC R&B MS OVERFLOW

## 2021-05-24 PROCEDURE — 250N000011 HC RX IP 250 OP 636: Performed by: INTERNAL MEDICINE

## 2021-05-24 PROCEDURE — 85027 COMPLETE CBC AUTOMATED: CPT | Performed by: INTERNAL MEDICINE

## 2021-05-24 PROCEDURE — 250N000013 HC RX MED GY IP 250 OP 250 PS 637: Performed by: INTERNAL MEDICINE

## 2021-05-24 PROCEDURE — 250N000013 HC RX MED GY IP 250 OP 250 PS 637: Performed by: HOSPITALIST

## 2021-05-24 PROCEDURE — 36415 COLL VENOUS BLD VENIPUNCTURE: CPT | Performed by: INTERNAL MEDICINE

## 2021-05-24 PROCEDURE — 80048 BASIC METABOLIC PNL TOTAL CA: CPT | Performed by: INTERNAL MEDICINE

## 2021-05-24 PROCEDURE — 999N000157 HC STATISTIC RCP TIME EA 10 MIN

## 2021-05-24 RX ADMIN — SALINE NASAL SPRAY 1 SPRAY: 1.5 SOLUTION NASAL at 10:13

## 2021-05-24 RX ADMIN — ENOXAPARIN SODIUM 40 MG: 40 INJECTION SUBCUTANEOUS at 14:20

## 2021-05-24 RX ADMIN — ALBUTEROL SULFATE 2 PUFF: 90 AEROSOL, METERED RESPIRATORY (INHALATION) at 02:58

## 2021-05-24 RX ADMIN — PANTOPRAZOLE SODIUM 40 MG: 40 TABLET, DELAYED RELEASE ORAL at 16:46

## 2021-05-24 RX ADMIN — OXYCODONE HYDROCHLORIDE 5 MG: 5 TABLET ORAL at 16:46

## 2021-05-24 RX ADMIN — FAMOTIDINE 20 MG: 20 TABLET ORAL at 08:28

## 2021-05-24 RX ADMIN — SALINE NASAL SPRAY 1 SPRAY: 1.5 SOLUTION NASAL at 03:00

## 2021-05-24 RX ADMIN — SALINE NASAL SPRAY 1 SPRAY: 1.5 SOLUTION NASAL at 12:18

## 2021-05-24 RX ADMIN — OXYCODONE HYDROCHLORIDE 5 MG: 5 TABLET ORAL at 10:26

## 2021-05-24 RX ADMIN — SALINE NASAL SPRAY 1 SPRAY: 1.5 SOLUTION NASAL at 14:18

## 2021-05-24 RX ADMIN — ALBUTEROL SULFATE 2 PUFF: 90 AEROSOL, METERED RESPIRATORY (INHALATION) at 19:32

## 2021-05-24 RX ADMIN — SALINE NASAL SPRAY 1 SPRAY: 1.5 SOLUTION NASAL at 08:29

## 2021-05-24 RX ADMIN — TAMSULOSIN HYDROCHLORIDE 0.4 MG: 0.4 CAPSULE ORAL at 08:28

## 2021-05-24 RX ADMIN — ALBUTEROL SULFATE 2 PUFF: 90 AEROSOL, METERED RESPIRATORY (INHALATION) at 14:18

## 2021-05-24 RX ADMIN — DEXAMETHASONE SODIUM PHOSPHATE 6 MG: 4 INJECTION, SOLUTION INTRAMUSCULAR; INTRAVENOUS at 21:42

## 2021-05-24 RX ADMIN — ALBUTEROL SULFATE 2 PUFF: 90 AEROSOL, METERED RESPIRATORY (INHALATION) at 08:29

## 2021-05-24 RX ADMIN — PANTOPRAZOLE SODIUM 40 MG: 40 TABLET, DELAYED RELEASE ORAL at 08:28

## 2021-05-24 NOTE — PLAN OF CARE
"Vitals:/73 (BP Location: Left arm)   Pulse 78   Temp 97.3  F (36.3  C) (Oral)   Resp 20   Ht 1.778 m (5' 10\")   Wt 77.5 kg (170 lb 14.4 oz)   SpO2 93%   BMI 24.52 kg/m    Labs:WBC 15.4   Cardiac:WDL  Resp:Dyspnea with exertion, dyspnea with extended periods of talking, dry frequent cough, lungs CTA, 3LNC. Pt had period after albuterol inhaler where oxygen needs were increased to 5L. Pt had been working and talking on the phone. Oxygen sensor switched, and pt was able to wean back to 3L NC.   Neuro:WDL  GI:WDL  :WDL  Skin:WDL  Activity: Up Ind in room, pt reports no change in his time to recover after ambulation. Pt up for shower and brushing his teeth, pt was 88% on 3L NC when back in bed initially  Diet:Reg  Pain:8/10 improved to 3/10 after oxycodone. Pt reports he gets headaches after prolonged periods of ambulation   Plan:Checking pro calcitonin to ensure no super infection, CBC and BMP in AM, decadron, scheduled albuterol inhaler     "

## 2021-05-24 NOTE — PROGRESS NOTES
[facility]    Hospitalist Progress Note             Date of Admission:  5/16/2021                   Day of hospitalization: 8    Assessment and Plan:   Pawel Vallejo is a 55-year-old male with a history of BPH admitted on 5/16 to the hospital service with acute hypoxic respiratory failure secondary to COVID-19 pneumonia. Still requiring 3L O2.  Completed remdesivir continues on Decadron..     Acute hypoxic respiratory failure due to covid 19 pneumonia    - symptoms started 8 days prior to the date of admission with covid + test on the same day (5/8)    - was in the ED on 5/12 and discharged home    - CT in ED: ground glass opacities    - started on decadron (started 5/16) and completed remdesivir     - on lovenox for DVT prophylaxis    - incentive spirometry    - indicated he should self-prone    - added pepcid to help with ongoing dry cough in addition to ppi    - D dimer and CRP trending down     Dizziness, borderline blood pressures: Improved  - negative orthostatics, cbc normal hemogloibn, monitor    Leukocytosis  - monitor, possibly related to steroids, if worsening and clinically worse may consider further imaging, CXR. Currently per patient symptoms seem to be improving but persists.  -Given ongoing and slightly increased O2 needs I will check procalcitonin to rule out bacterial superinfection and possible need for antibiotics    BPH    - cont flomax    Nosebleeds from dry oxygen  -Humidified oxygen  -Ocean mist       Diet: Regular  DVT Prophylaxis: Enoxaparin (Lovenox) SQ  Malnutrition: No  Valente Catheter: No  Code Status: Full Code      Disposition Plan   Expected discharge: unknown             Radha Ryan MD            Total Time >35 min. D/w wife updated her  Subjective   Reviewed chart , overnight patient was on facemask up to 7 L more for comfort due to nosebleeds however later was switched back to nasal cannula after using nasal lubricants and humidified oxygen  "maintaining sats on 3 L.  Feels short of breath with minimal activity and with ADLs.  Had detailed conversation with patient's wife and updated her.  10 point review of system was completed was otherwise negative.  Updated wife     Objective   /73 (BP Location: Left arm)   Pulse 73   Temp 96.9  F (36.1  C) (Oral)   Resp 20   Ht 1.778 m (5' 10\")   Wt 77.5 kg (170 lb 14.4 oz)   SpO2 93%   BMI 24.52 kg/m       Physical Exam  General: Pt in NAD, normal appearance  HEENT: OP clear MMM, no JVD  Lungs: Bibasilar crackles normal breathing  without accessory muscle usage, no wheezing, rhonchi or crackles  Cardiac: +S1, S2, RRR, no MRG, no edema  Abdominal: normal bowel sounds, NT/ND  Skin: warm, dry, normal turgor, no rash  Psyche: A& O x3, appropriate affect                   Labs and Imaging Results:      Recent Labs   Lab 05/24/21  0651 05/22/21  1049   WBC 15.4* 15.2*   HGB 14.7 14.1    292        Recent Labs   Lab 05/24/21  0651      CO2 27   BUN 22      No results for input(s): INR, PTT in the last 168 hours.   No results for input(s): CKMB in the last 168 hours.    Invalid input(s): TROPONINT     Recent Labs   Lab 05/22/21  0629 05/21/21  0704   ALBUMIN 2.4* 2.4*   AST 98* 126*   * 331*   ALKPHOS 127 122        Micro:     Radio:  CT Chest Pulmonary Embolism w Contrast   Final Result   IMPRESSION:   1.  Multifocal patchy bilateral irregular consolidation most   consistent with atypical pneumonia such as COVID-19 pneumonia.   2.  Trace bibasilar pleural fluid.   3.  No evidence for pulmonary embolism.      MADELIN DANIEL MD      XR Chest Port 1 View   Final Result   IMPRESSION: Mild hazy increased density in both lower lungs is new   since the previous exam, and could be infectious in etiology. Few tiny   calcified granulomas in both lungs are unchanged. No pneumothorax.   Heart size appears stable. Pulmonary vascularity is within normal   limits.      TICO POLK MD            "   Medications:      Scheduled Meds:      albuterol  2 puff Inhalation Q6H     dexamethasone  6 mg Intravenous Q24H     enoxaparin ANTICOAGULANT  40 mg Subcutaneous Q24H     famotidine  20 mg Oral BID     pantoprazole  40 mg Oral BID AC     tamsulosin  0.4 mg Oral Daily     Continuous Infusions:    PRN Meds:  acetaminophen, alum & mag hydroxide-simethicone, sore throat lozenge, benzonatate, calcium carbonate, diphenhydrAMINE, ibuprofen, melatonin, metoclopramide, naloxone **OR** naloxone **OR** naloxone **OR** naloxone, ondansetron **OR** ondansetron, oxyCODONE, polyethylene glycol, sodium chloride

## 2021-05-24 NOTE — PLAN OF CARE
"Blood pressure 113/71, pulse 83, temperature 96.8  F (36  C), temperature source Oral, resp. rate 20, height 1.778 m (5' 10\"), weight 77.5 kg (170 lb 14.4 oz), SpO2 92 %.      Neuro:   Patient is alert, orientated, pleasant, cooperative with cares  Pulm:    LS  Diminished.  Sats 90-93% on 7 liters via oxymizer mask.    Patient is noted to be short of breath with any exertion.  Some shortness of breath when speaking in full sentences.  Ocean nasal spray given for c/o of dry nasal passages.    GI:         WDL  Patient tolerating evening meal tray without nausea or emesis.  :        WDL   Activity:  Patient is up independently in the room  Plan:      Continue to provide supportive cares.  Encourage IS    "

## 2021-05-25 ENCOUNTER — APPOINTMENT (OUTPATIENT)
Dept: CT IMAGING | Facility: CLINIC | Age: 56
DRG: 177 | End: 2021-05-25
Attending: HOSPITALIST
Payer: COMMERCIAL

## 2021-05-25 LAB
ANION GAP SERPL CALCULATED.3IONS-SCNC: 5 MMOL/L (ref 3–14)
BUN SERPL-MCNC: 26 MG/DL (ref 7–30)
CALCIUM SERPL-MCNC: 8 MG/DL (ref 8.5–10.1)
CHLORIDE SERPL-SCNC: 101 MMOL/L (ref 94–109)
CO2 SERPL-SCNC: 28 MMOL/L (ref 20–32)
CREAT SERPL-MCNC: 0.73 MG/DL (ref 0.66–1.25)
ERYTHROCYTE [DISTWIDTH] IN BLOOD BY AUTOMATED COUNT: 13.2 % (ref 10–15)
GFR SERPL CREATININE-BSD FRML MDRD: >90 ML/MIN/{1.73_M2}
GLUCOSE SERPL-MCNC: 127 MG/DL (ref 70–99)
HCT VFR BLD AUTO: 42.4 % (ref 40–53)
HGB BLD-MCNC: 14.4 G/DL (ref 13.3–17.7)
MCH RBC QN AUTO: 31.2 PG (ref 26.5–33)
MCHC RBC AUTO-ENTMCNC: 34 G/DL (ref 31.5–36.5)
MCV RBC AUTO: 92 FL (ref 78–100)
PLATELET # BLD AUTO: 329 10E9/L (ref 150–450)
POTASSIUM SERPL-SCNC: 4.5 MMOL/L (ref 3.4–5.3)
RBC # BLD AUTO: 4.62 10E12/L (ref 4.4–5.9)
SODIUM SERPL-SCNC: 134 MMOL/L (ref 133–144)
WBC # BLD AUTO: 16.8 10E9/L (ref 4–11)

## 2021-05-25 PROCEDURE — 85027 COMPLETE CBC AUTOMATED: CPT | Performed by: INTERNAL MEDICINE

## 2021-05-25 PROCEDURE — 999N000128 HC STATISTIC PERIPHERAL IV START W/O US GUIDANCE

## 2021-05-25 PROCEDURE — 250N000011 HC RX IP 250 OP 636: Performed by: INTERNAL MEDICINE

## 2021-05-25 PROCEDURE — 80048 BASIC METABOLIC PNL TOTAL CA: CPT | Performed by: INTERNAL MEDICINE

## 2021-05-25 PROCEDURE — 250N000009 HC RX 250: Performed by: INTERNAL MEDICINE

## 2021-05-25 PROCEDURE — 250N000013 HC RX MED GY IP 250 OP 250 PS 637: Performed by: HOSPITALIST

## 2021-05-25 PROCEDURE — 999N000127 HC STATISTIC PERIPHERAL IV START W US GUIDANCE

## 2021-05-25 PROCEDURE — 71275 CT ANGIOGRAPHY CHEST: CPT

## 2021-05-25 PROCEDURE — 36415 COLL VENOUS BLD VENIPUNCTURE: CPT | Performed by: INTERNAL MEDICINE

## 2021-05-25 PROCEDURE — 250N000013 HC RX MED GY IP 250 OP 250 PS 637: Performed by: INTERNAL MEDICINE

## 2021-05-25 PROCEDURE — 99233 SBSQ HOSP IP/OBS HIGH 50: CPT | Performed by: HOSPITALIST

## 2021-05-25 PROCEDURE — 250N000011 HC RX IP 250 OP 636: Performed by: HOSPITALIST

## 2021-05-25 PROCEDURE — 120N000004 HC R&B MS OVERFLOW

## 2021-05-25 RX ORDER — IOPAMIDOL 755 MG/ML
500 INJECTION, SOLUTION INTRAVASCULAR ONCE
Status: COMPLETED | OUTPATIENT
Start: 2021-05-25 | End: 2021-05-25

## 2021-05-25 RX ADMIN — PANTOPRAZOLE SODIUM 40 MG: 40 TABLET, DELAYED RELEASE ORAL at 09:20

## 2021-05-25 RX ADMIN — IOPAMIDOL 60 ML: 755 INJECTION, SOLUTION INTRAVENOUS at 17:45

## 2021-05-25 RX ADMIN — ALBUTEROL SULFATE 2 PUFF: 90 AEROSOL, METERED RESPIRATORY (INHALATION) at 21:06

## 2021-05-25 RX ADMIN — OXYCODONE HYDROCHLORIDE 5 MG: 5 TABLET ORAL at 14:43

## 2021-05-25 RX ADMIN — TAMSULOSIN HYDROCHLORIDE 0.4 MG: 0.4 CAPSULE ORAL at 09:20

## 2021-05-25 RX ADMIN — ALBUTEROL SULFATE 2 PUFF: 90 AEROSOL, METERED RESPIRATORY (INHALATION) at 09:21

## 2021-05-25 RX ADMIN — ALBUTEROL SULFATE 2 PUFF: 90 AEROSOL, METERED RESPIRATORY (INHALATION) at 14:20

## 2021-05-25 RX ADMIN — SALINE NASAL SPRAY 1 SPRAY: 1.5 SOLUTION NASAL at 02:03

## 2021-05-25 RX ADMIN — ENOXAPARIN SODIUM 40 MG: 40 INJECTION SUBCUTANEOUS at 14:20

## 2021-05-25 RX ADMIN — OXYCODONE HYDROCHLORIDE 5 MG: 5 TABLET ORAL at 09:38

## 2021-05-25 RX ADMIN — PANTOPRAZOLE SODIUM 40 MG: 40 TABLET, DELAYED RELEASE ORAL at 15:45

## 2021-05-25 RX ADMIN — SALINE NASAL SPRAY 1 SPRAY: 1.5 SOLUTION NASAL at 09:24

## 2021-05-25 RX ADMIN — SODIUM CHLORIDE 56 ML: 9 INJECTION, SOLUTION INTRAVENOUS at 17:45

## 2021-05-25 RX ADMIN — DEXAMETHASONE SODIUM PHOSPHATE 6 MG: 4 INJECTION, SOLUTION INTRAMUSCULAR; INTRAVENOUS at 22:10

## 2021-05-25 RX ADMIN — ALBUTEROL SULFATE 2 PUFF: 90 AEROSOL, METERED RESPIRATORY (INHALATION) at 02:02

## 2021-05-25 NOTE — PLAN OF CARE
"2300-1089    Inpatient Progress Note:    /70 (BP Location: Left arm)   Pulse 86   Temp 96.6  F (35.9  C) (Oral)   Resp 18   Ht 1.778 m (5' 10\")   Wt 77.5 kg (170 lb 14.4 oz)   SpO2 93%   BMI 24.52 kg/m       Orientation: alert and oriented x4  Pain status: denies - PRN oxycodone for headache (not needed this shift)  Activity: up independently in room  Peripheral edema: none  Resp: dyspnea on exertion, dry cough - declined need for medication, scheduled pepcid and PPI ordered to help with cough, 4 L O2 via NC with humidification - PRN ocean spray for dry nose, takes time to recover once up walking around or following albuterol inhaler administration   Cardiac: WDL  GI: WDL  :  WDL  Skin: WDL  LDA: peripheral IV  Infusions: SL  Pertinent Labs: WBC 16.8, procalcitonin <0.05   Diet: regular  Consults:   Discharge Plan: continue decadron, lovenox, albuterol inhaler, wean O2, procalcitonin <0.05 - rule out bacterial superinfection    Will continue to monitor and provide cares.     Monse Leo RN         "

## 2021-05-25 NOTE — PROGRESS NOTES
[facility]    Medicine Progress Note - Hospitalist Service       Date of Admission:  5/16/2021    Assessment & Plan   Pawel Vallejo is a 55-year-old male with a history of BPH admitted on 5/16 to the hospital service with acute hypoxic respiratory failure secondary to COVID-19 pneumonia. Still requiring 4L O2, completed remdesivir.    Acute hypoxic respiratory failure due to covid 19 pneumonia    - symptoms started 8 days prior to the date of admission with covid + test on the same day (5/8)    - was in the ED on 5/12 and discharged home    - CT in ED: ground glass opacities    - started on decadron (9/10) and remdesivir (completed 5 day course on 5/20)    - on lovenox for DVT prophylaxis    - incentive spirometry    - indicated he should self-prone: he is dong this    - added albuterol inhaler    - given increasing oxygen needs and chest pain, will obtain CT/PE    - Pro-ana laura and d-dimer were normal 5/24 and 5/23, respectively    Acid reflux symptoms    - indicated he should sit for meals and then walk around    - cont home omeprazole    - added Tums and Maalox    Transaminitis    - likely due to underlying viral infection vs remdesivir     - follow- check tomorrow    BPH    - cont flomax    Called wife and updated her (PLEASE BE SURE TO CALL WIFE AND UPDATE HER DAILY)    Diet: Regular  DVT Prophylaxis: Enoxaparin (Lovenox) SQ  Malnutrition: No  Valente Catheter: No  Code Status: Full Code     Disposition Plan   Expected discharge: 1-3 days    Aramis Hunter MD  Hospitalist Service  [facility]  ______________________________________________________________________    Interval History   Patient doing ok today. Feels about the same. Still having coughing spasms. Having some chest discomfort on the right.    Data reviewed today: I reviewed all medications, new labs and imaging results over the last 24 hours.     Physical Exam   BP 96/70 (BP Location: Left arm)   Pulse 72   Temp 96.3  F  "(35.7  C) (Oral)   Resp 18   Ht 1.778 m (5' 10\")   Wt 77.5 kg (170 lb 14.4 oz)   SpO2 94%   BMI 24.52 kg/m    170 lbs 14.4 oz       General: Coughing occasionally.    HEENT: No scleral icterus. Oropharynx moist.     Neck: Supple. Normal range of motion.     Pulmonary: Normal work of breathing. Crackles in left base    Cardiovascular: Regular rate and rhythm without murmur or extra heart sounds.    Abdomen: Soft and non-tender.    Extremities: No peripheral edema. No clubbing or cyanosis.     Neurologic: Awake, alert, appropriate.    Skin: Warm and dry.    Psychiatric: Normal affect and mood.     Data        Medications      Current Facility-Administered Medications   Medication Dose Route Frequency     albuterol  2 puff Inhalation Q6H     dexamethasone  6 mg Intravenous Q24H     enoxaparin ANTICOAGULANT  40 mg Subcutaneous Q24H     pantoprazole  40 mg Oral BID AC     tamsulosin  0.4 mg Oral Daily       "

## 2021-05-25 NOTE — PLAN OF CARE
"Care from 3-7p  Pt is AxOx4. Ind in room. PIV sl. Cont pulse ox. Desat with exertion. Consistently high 90s at 4L nc. CT scan ordered, no PE on study. Pt reporting abd pain, recently received oxy and not wanting anything else. Continue decadron, lovenox, albuterol inhaler. COVID precautions maintained. Pt cooperative, but is frustrated. Stating \"you know the definition of insanity is doing the same thing and expecting and different result,\" when verbalizing frustration with staff trying to wean oxygen, and \"staff not understanding the albuterol always makes my oxygen lower.\" Pt education, listening and support provided. Will continue to monitor.  "

## 2021-05-25 NOTE — PLAN OF CARE
Pt AxOx4. Ind in room. PIV sl. Cont pulse ox. Desat with exertion. Consistently low 90s at 4L nc. CT scan ordered. 18 g in R ac needed. Flyer paged. 5 mg Oxy given 1x for 6/10 pain. Continue decadron, lovenox, albuterol inhaler. Will continue to monitor.

## 2021-05-25 NOTE — PLAN OF CARE
"Pt alert and oriented x4. Having a headache this am - given oxy. Pt on 4L O2. VSS, O2 sats 93%. Up indep in room, SOB w/ exertion. Dry cough. Using ocean spray. Regular diet - eating well. SL. Continue decadron, lovenox and inhaler.     Will cont supoprotive cares.  /66 (BP Location: Left arm)   Pulse 79   Temp 96.3  F (35.7  C) (Oral)   Resp 18   Ht 1.778 m (5' 10\")   Wt 77.5 kg (170 lb 14.4 oz)   SpO2 93%   BMI 24.52 kg/m      "

## 2021-05-26 LAB
ALBUMIN SERPL-MCNC: 2.4 G/DL (ref 3.4–5)
ALP SERPL-CCNC: 108 U/L (ref 40–150)
ALT SERPL W P-5'-P-CCNC: 211 U/L (ref 0–70)
AST SERPL W P-5'-P-CCNC: 55 U/L (ref 0–45)
BASOPHILS # BLD AUTO: 0 10E9/L (ref 0–0.2)
BASOPHILS NFR BLD AUTO: 0 %
BILIRUB DIRECT SERPL-MCNC: <0.1 MG/DL (ref 0–0.2)
BILIRUB SERPL-MCNC: 0.3 MG/DL (ref 0.2–1.3)
DIFFERENTIAL METHOD BLD: ABNORMAL
EOSINOPHIL # BLD AUTO: 0 10E9/L (ref 0–0.7)
EOSINOPHIL NFR BLD AUTO: 0 %
ERYTHROCYTE [DISTWIDTH] IN BLOOD BY AUTOMATED COUNT: 13.3 % (ref 10–15)
HCT VFR BLD AUTO: 40.9 % (ref 40–53)
HGB BLD-MCNC: 13.6 G/DL (ref 13.3–17.7)
LYMPHOCYTES # BLD AUTO: 1.2 10E9/L (ref 0.8–5.3)
LYMPHOCYTES NFR BLD AUTO: 8 %
MCH RBC QN AUTO: 30.4 PG (ref 26.5–33)
MCHC RBC AUTO-ENTMCNC: 33.3 G/DL (ref 31.5–36.5)
MCV RBC AUTO: 92 FL (ref 78–100)
METAMYELOCYTES # BLD: 0.5 10E9/L
METAMYELOCYTES NFR BLD MANUAL: 3 %
MONOCYTES # BLD AUTO: 0.6 10E9/L (ref 0–1.3)
MONOCYTES NFR BLD AUTO: 4 %
NEUTROPHILS # BLD AUTO: 12.9 10E9/L (ref 1.6–8.3)
NEUTROPHILS NFR BLD AUTO: 85 %
PLATELET # BLD AUTO: 302 10E9/L (ref 150–450)
PLATELET # BLD EST: ABNORMAL 10*3/UL
PROT SERPL-MCNC: 6.3 G/DL (ref 6.8–8.8)
RBC # BLD AUTO: 4.47 10E12/L (ref 4.4–5.9)
RBC MORPH BLD: ABNORMAL
WBC # BLD AUTO: 15.2 10E9/L (ref 4–11)

## 2021-05-26 PROCEDURE — 250N000011 HC RX IP 250 OP 636: Performed by: HOSPITALIST

## 2021-05-26 PROCEDURE — 250N000013 HC RX MED GY IP 250 OP 250 PS 637: Performed by: HOSPITALIST

## 2021-05-26 PROCEDURE — 80076 HEPATIC FUNCTION PANEL: CPT | Performed by: HOSPITALIST

## 2021-05-26 PROCEDURE — 85025 COMPLETE CBC W/AUTO DIFF WBC: CPT | Performed by: HOSPITALIST

## 2021-05-26 PROCEDURE — 99233 SBSQ HOSP IP/OBS HIGH 50: CPT | Performed by: HOSPITALIST

## 2021-05-26 PROCEDURE — 36415 COLL VENOUS BLD VENIPUNCTURE: CPT | Performed by: HOSPITALIST

## 2021-05-26 PROCEDURE — 120N000004 HC R&B MS OVERFLOW

## 2021-05-26 PROCEDURE — 250N000013 HC RX MED GY IP 250 OP 250 PS 637: Performed by: INTERNAL MEDICINE

## 2021-05-26 PROCEDURE — 250N000011 HC RX IP 250 OP 636: Performed by: INTERNAL MEDICINE

## 2021-05-26 PROCEDURE — 999N000105 HC STATISTIC NO DOCUMENTATION TO SUPPORT CHARGE

## 2021-05-26 PROCEDURE — 99207 PR CDG-CUT & PASTE-POTENTIAL IMPACT ON LEVEL: CPT | Performed by: HOSPITALIST

## 2021-05-26 RX ADMIN — PANTOPRAZOLE SODIUM 40 MG: 40 TABLET, DELAYED RELEASE ORAL at 06:59

## 2021-05-26 RX ADMIN — OXYCODONE HYDROCHLORIDE 5 MG: 5 TABLET ORAL at 11:52

## 2021-05-26 RX ADMIN — ALBUTEROL SULFATE 2 PUFF: 90 AEROSOL, METERED RESPIRATORY (INHALATION) at 18:40

## 2021-05-26 RX ADMIN — POLYETHYLENE GLYCOL 3350 17 G: 17 POWDER, FOR SOLUTION ORAL at 18:38

## 2021-05-26 RX ADMIN — ALBUTEROL SULFATE 2 PUFF: 90 AEROSOL, METERED RESPIRATORY (INHALATION) at 08:52

## 2021-05-26 RX ADMIN — ENOXAPARIN SODIUM 40 MG: 40 INJECTION SUBCUTANEOUS at 14:33

## 2021-05-26 RX ADMIN — TAMSULOSIN HYDROCHLORIDE 0.4 MG: 0.4 CAPSULE ORAL at 08:48

## 2021-05-26 RX ADMIN — SALINE NASAL SPRAY 1 SPRAY: 1.5 SOLUTION NASAL at 08:54

## 2021-05-26 RX ADMIN — PANTOPRAZOLE SODIUM 40 MG: 40 TABLET, DELAYED RELEASE ORAL at 16:32

## 2021-05-26 RX ADMIN — OXYCODONE HYDROCHLORIDE 5 MG: 5 TABLET ORAL at 18:38

## 2021-05-26 RX ADMIN — ALBUTEROL SULFATE 2 PUFF: 90 AEROSOL, METERED RESPIRATORY (INHALATION) at 02:12

## 2021-05-26 RX ADMIN — ALBUTEROL SULFATE 2 PUFF: 90 AEROSOL, METERED RESPIRATORY (INHALATION) at 14:33

## 2021-05-26 RX ADMIN — DEXAMETHASONE SODIUM PHOSPHATE 6 MG: 4 INJECTION, SOLUTION INTRAMUSCULAR; INTRAVENOUS at 21:00

## 2021-05-26 NOTE — PLAN OF CARE
"Pt alert and oriented x4. Has headache pain, using oxycodone. States he is feeling much better today. O2 weaned down to 2.5 L. O2 sats are mid 90's. Some SOB when moving around his room. Receiving decadron and Lovenox. SL. Up indep. Regular diet, eating well. Will cont supportive cares.    /75 (BP Location: Left arm)   Pulse 73   Temp 96.1  F (35.6  C) (Oral)   Resp 18   Ht 1.778 m (5' 10\")   Wt 77.5 kg (170 lb 14.4 oz)   SpO2 94%   BMI 24.52 kg/m      "

## 2021-05-26 NOTE — PLAN OF CARE
"BP 98/54 (BP Location: Right arm)   Pulse 66   Temp 96.3  F (35.7  C) (Oral)   Resp 18   Ht 1.778 m (5' 10\")   Wt 77.5 kg (170 lb 14.4 oz)   SpO2 96%   BMI 24.52 kg/m        Neuro: WDL - Anxiety related COVID residual side effects reported  Cardiac: WDL  Lungs: Slight crackle heard to base - otherwise clear. Cough present. Currently on 4L NC. Continue to try weaning. Has been in the mid-90s on 4LPM most of night.  GI: WDL  : WDL  Pain: Denies currently.  IV: PIV SL  Meds: Lovenox, Decadron  Labs/tests: repeat CT shows \"No pulmonary embolism. Multifocal patchy regions of groundglass infiltrates predominantly peripheral with slight progression. The tiny pleural effusions have nearly resolved.\"  Diet: Regular   Activity: Independent in the room   Misc: Pt requesting update from providers regarding concerns of leaving AMA. Pt reports thinking about consequences, but no decision as of yet. Encouraged pt to remain. Comforted and provided support for pt.  Plan: Will continue to monitor and provide cares.   "

## 2021-05-26 NOTE — PROGRESS NOTES
CLINICAL NUTRITION SERVICES - REASSESSMENT NOTE      Malnutrition Diagnosis: Unable to determine due to lack of nutrition history and NFPE       EVALUATION OF PROGRESS TOWARD GOALS   Diet: Regular, prn Ensure    Intake/Tolerance:  100% meal consumption per flowsheet review since last RD assessment.  Consistently ordering meals TID.  Many attempts to call into room during admission without success.  Had nurse transfer call into room today, patient again did not answer.  He has weaned down to 2.5 L NC.      ASSESSED NUTRITION NEEDS PER APPROVED PRACTICE GUIDELINES:     Dosing Weight 78 kg   Estimated Energy Needs: 25-30 Kcal/Kg  Justification: maintenance with COVID+  Estimated Protein Needs: 1-1.2+ g pro/Kg  Justification: minimum for preservation of lean body mass with COVID+  Estimated Fluid Needs: per MD      NEW FINDINGS:   - Medications reviewed including:     albuterol  2 puff Inhalation Q6H     dexamethasone  6 mg Intravenous Q24H     enoxaparin ANTICOAGULANT  40 mg Subcutaneous Q24H     pantoprazole  40 mg Oral BID AC     tamsulosin  0.4 mg Oral Daily         - Labs reviewed including:  Electrolytes  Potassium (mmol/L)   Date Value   05/25/2021 4.5   05/24/2021 4.8   05/16/2021 4.0    Blood Glucose  Glucose (mg/dL)   Date Value   05/25/2021 127 (H)   05/24/2021 111 (H)   05/16/2021 115 (H)   05/12/2021 90   12/21/2020 92    Inflammatory Markers  CRP Inflammation (mg/L)   Date Value   05/23/2021 6.8   05/18/2021 15.0 (H)   05/17/2021 21.5 (H)     WBC (10e9/L)   Date Value   05/26/2021 15.2 (H)   05/25/2021 16.8 (H)   05/24/2021 15.4 (H)     Albumin (g/dL)   Date Value   05/26/2021 2.4 (L)   05/22/2021 2.4 (L)   05/21/2021 2.4 (L)      Sodium (mmol/L)   Date Value   05/25/2021 134   05/24/2021 135   05/16/2021 139    Renal  Urea Nitrogen (mg/dL)   Date Value   05/25/2021 26   05/24/2021 22   05/16/2021 11     Creatinine (mg/dL)   Date Value   05/25/2021 0.73   05/24/2021 0.72   05/16/2021 0.76      Additional  No results found for: TRIG, URINEKETONE     -  Weight trending reviewed though not able to comment with only 1 wt since admission:  Vitals:    05/16/21 1409   Weight: 77.5 kg (170 lb 14.4 oz)     - Stooling patterns reviewed.      Previous Goals:   Patient to consume at least 75% of meals or supplements TID.   Evaluation: Met    Previous Nutrition Diagnosis:   Predicted inadequate nutrient intake (energy/protein) related to potential for decline in PO intakes during admit pending  Evaluation: No change      CURRENT NUTRITION DIAGNOSIS  Predicted inadequate nutrient intake (energy/protein) related to potential for decline in PO intakes during admit pending    INTERVENTIONS  Recommendations / Nutrition Prescription  Continue diet as ordered.    No updated wt since admit.  Reweigh as able.      Implementation  Collaboration and Referral of Nutrition care: Had nurse transfer into room.    Goals  Patient to consume at least 75% of meals TID.        MONITORING AND EVALUATION:  Progress towards goals will be monitored and evaluated per protocol and Practice Guidelines      Griselda Sweeney RDN, LD  Clinical Dietitian  3rd floor/ICU: [phone]  All other floors: [phone]  Weekend/holiday: [phone]

## 2021-05-26 NOTE — PROGRESS NOTES
[facility]    Medicine Progress Note - Hospitalist Service       Date of Admission:  5/16/2021    Assessment & Plan   Pawel Vallejo is a 55-year-old male with a history of BPH admitted on 5/16 to the hospital service with acute hypoxic respiratory failure secondary to COVID-19 pneumonia. Still requiring 2.5 L O2 (down from 4), completed remdesivir.    Acute hypoxic respiratory failure due to covid 19 pneumonia    - symptoms started 8 days prior to the date of admission with covid + test on the same day (5/8)    - was in the ED on 5/12 and discharged home    - CT in ED: ground glass opacities    - started on decadron (10/10) and remdesivir (completed 5 day course on 5/20), stop decadron after tonight's dose    - on lovenox for DVT prophylaxis    - incentive spirometry    - indicated he should self-prone: he is dong this    - added albuterol inhaler    - given increasing oxygen needs and chest pain, will obtain CT/PE    - Pro-ana laura and d-dimer were normal 5/24 and 5/23, respectively    - will have completed med courses tonight, inflammatory markers had normalized, patient feeling better. Possible discharge home.    Acid reflux symptoms    - indicated he should sit for meals and then walk around    - cont home omeprazole    - added Tums and Maalox    Transaminitis    - likely due to underlying viral infection vs remdesivir     - follow- check tomorrow    BPH    - cont flomax    Called wife and updated her (PLEASE BE SURE TO CALL WIFE AND UPDATE HER DAILY)    Diet: Regular  DVT Prophylaxis: Enoxaparin (Lovenox) SQ  Malnutrition: No  Valente Catheter: No  Code Status: Full Code     Disposition Plan   Expected discharge:  Possibly tomorrow  Aramis Hunter MD  Hospitalist Service  [facility]  ______________________________________________________________________    Interval History   Patient feeling good today! Eating well. O2 itrating down    Data reviewed today: I reviewed all medications, new  "labs and imaging results over the last 24 hours.     Physical Exam   /75 (BP Location: Left arm)   Pulse 73   Temp 96.1  F (35.6  C) (Oral)   Resp 18   Ht 1.778 m (5' 10\")   Wt 77.5 kg (170 lb 14.4 oz)   SpO2 94%   BMI 24.52 kg/m    170 lbs 14.4 oz       General: Coughing occasionally.    HEENT: No scleral icterus. Oropharynx moist.     Neck: Supple. Normal range of motion.     Pulmonary: Normal work of breathing. Crackles in left base    Cardiovascular: Regular rate and rhythm without murmur or extra heart sounds.    Abdomen: Soft and non-tender.    Extremities: No peripheral edema. No clubbing or cyanosis.     Neurologic: Awake, alert, appropriate.    Skin: Warm and dry.    Psychiatric: Normal affect and mood.     Data        Medications      Current Facility-Administered Medications   Medication Dose Route Frequency     albuterol  2 puff Inhalation Q6H     dexamethasone  6 mg Intravenous Q24H     enoxaparin ANTICOAGULANT  40 mg Subcutaneous Q24H     pantoprazole  40 mg Oral BID AC     tamsulosin  0.4 mg Oral Daily       "

## 2021-05-27 VITALS
HEART RATE: 75 BPM | BODY MASS INDEX: 24.47 KG/M2 | OXYGEN SATURATION: 94 % | HEIGHT: 70 IN | WEIGHT: 170.9 LBS | RESPIRATION RATE: 16 BRPM | DIASTOLIC BLOOD PRESSURE: 74 MMHG | TEMPERATURE: 96.5 F | SYSTOLIC BLOOD PRESSURE: 120 MMHG

## 2021-05-27 PROCEDURE — 250N000013 HC RX MED GY IP 250 OP 250 PS 637: Performed by: HOSPITALIST

## 2021-05-27 PROCEDURE — 250N000013 HC RX MED GY IP 250 OP 250 PS 637: Performed by: INTERNAL MEDICINE

## 2021-05-27 PROCEDURE — 99239 HOSP IP/OBS DSCHRG MGMT >30: CPT | Performed by: HOSPITALIST

## 2021-05-27 RX ORDER — OXYCODONE HYDROCHLORIDE 5 MG/1
5 TABLET ORAL EVERY 8 HOURS PRN
Qty: 10 TABLET | Refills: 0 | Status: ON HOLD | OUTPATIENT
Start: 2021-05-27 | End: 2024-05-11

## 2021-05-27 RX ORDER — BENZONATATE 100 MG/1
100 CAPSULE ORAL 3 TIMES DAILY PRN
Qty: 30 CAPSULE | Refills: 0 | Status: ON HOLD | OUTPATIENT
Start: 2021-05-27 | End: 2024-05-11

## 2021-05-27 RX ORDER — PANTOPRAZOLE SODIUM 40 MG/1
40 TABLET, DELAYED RELEASE ORAL
Qty: 60 TABLET | Refills: 1 | Status: ON HOLD | OUTPATIENT
Start: 2021-05-27 | End: 2021-11-08

## 2021-05-27 RX ORDER — PANTOPRAZOLE SODIUM 40 MG/1
40 TABLET, DELAYED RELEASE ORAL
Qty: 60 TABLET | Refills: 1 | Status: ON HOLD | OUTPATIENT
Start: 2021-05-27 | End: 2024-05-11

## 2021-05-27 RX ADMIN — OXYCODONE HYDROCHLORIDE 5 MG: 5 TABLET ORAL at 08:27

## 2021-05-27 RX ADMIN — ALBUTEROL SULFATE 2 PUFF: 90 AEROSOL, METERED RESPIRATORY (INHALATION) at 08:08

## 2021-05-27 RX ADMIN — PANTOPRAZOLE SODIUM 40 MG: 40 TABLET, DELAYED RELEASE ORAL at 06:46

## 2021-05-27 RX ADMIN — TAMSULOSIN HYDROCHLORIDE 0.4 MG: 0.4 CAPSULE ORAL at 08:08

## 2021-05-27 RX ADMIN — SALINE NASAL SPRAY 1 SPRAY: 1.5 SOLUTION NASAL at 08:09

## 2021-05-27 NOTE — PLAN OF CARE
Temp: 96.3  F (35.7  C) Temp src: Oral BP: 117/74 Pulse: 84   Resp: 18 SpO2: 92 % O2 Device: Nasal cannula Oxygen Delivery: 2 LPM     Pt up Independently. Complaining of a headache, prn oxy given x1. Pt requesting a stool softener, miralax given. Pt completed remdesivir on 5/20. Pt practicing his incentive spirometer. Pt declined needing anything for his productive cough. Had last dose of decadron this evening. Plan- pain management, albuterol inhaler every 6 hours, lovenox injection every 24 hours, pt hopeful to discharge home on oxygen tomorrow.

## 2021-05-27 NOTE — PROGRESS NOTES
Pt does not want to be woken up for midnight vital signs 5/27, if possible, pt requesting nursing staff to cluster all cares for 2 or 3am.

## 2021-05-27 NOTE — PROGRESS NOTES
"During care rounds, CM updated that patient needs home oxygen. \"Walk test\" documented; order for home oxygen entered; face to face progress note entered. Contacted  DME ([phone] option 2). Provided patient information to staff at  DME. RT will return call to CM with delivery time.     CM will continue to follow patient until discharge for any additional needs.     Manda Villela RN, BSN, CPHN, CM  Inpatient Care Coordination-[facility]  [phone]    Addendum 12:19pm - per discussion with bedside RNKahlil, patient needs to f/u with his PCP within a week to evaluate continued need for home oxygen. CM scheduled virtual follow up appointment with Martín Phillips for next Wednesday, 6/2/21 at 10:40am. Updated AVS & bedside RN.   ds      "

## 2021-05-27 NOTE — DISCHARGE INSTRUCTIONS
A [facility] follow up appointment has been scheduled for you with Dr. Melvi Hale at [facility] on Wednesday, 6/2/21 at 10:40am. You will receive video instructions via your email. A medical assistant will call you prior to your appointment. Please note the doctor will call you between 10:25 and 10:55 to speak with you via phone. You do not need to go to the clinic unless directed by your doctor or clinic staff.  Please call the clinic at [phone] if you have questions or need to reschedule.

## 2021-05-27 NOTE — PROGRESS NOTES
Patient has been assessed for Home Oxygen needs.  Oxygen readings:   *   RA - at rest 90 Pulse oximetry SPO2 %  *   RA - during activity/with exercise SPO2 87 %  *   O2 at 2 liters/minute (at rest) ...SPO2 93 %  *   O2 at 2 liters/minute (during activity/with exercise) ...SPO2 92 %

## 2021-05-27 NOTE — PHARMACY - DISCHARGE MEDICATION RECONCILIATION AND EDUCATION
Pawel Vallejo     1965      male    [id number]                                [id number]    Allergy: Patient has no known allergies.    RX: Discharge Medication Consult by Pharmacist  EDUCATION:   Discharge Medication List   Gustavo Vallejo   Home Medication Instructions GODFREY:[id number]    Printed on:05/27/21 7511   Medication Information                      acetaminophen (TYLENOL) 325 MG tablet  Take 325-650 mg by mouth every 6 hours as needed for mild pain             albuterol (PROAIR HFA/PROVENTIL HFA/VENTOLIN HFA) 108 (90 Base) MCG/ACT inhaler  Inhale 2 puffs into the lungs every 4 hours as needed for shortness of breath / dyspnea or wheezing             Ascorbic Acid (VITAMIN C PO)  Take 1 tablet by mouth daily             benzonatate (TESSALON) 100 MG capsule  Take 1 capsule (100 mg) by mouth 3 times daily as needed for cough             Cholecalciferol (VITAMIN D3 PO)  Take 1 tablet by mouth daily             clobetasol (TEMOVATE) 0.05 % external ointment  Apply topically nightly as needed To psoriasis on foot             ibuprofen (ADVIL/MOTRIN) 100 MG tablet  Take 100 mg by mouth every 4 hours as needed             KRILL OIL PO  Take 1 capsule by mouth daily              Multiple Vitamins-Minerals (ZINC PO)  Take 1 tablet by mouth daily             oxyCODONE (ROXICODONE) 5 MG tablet  Take 1 tablet (5 mg) by mouth every 8 hours as needed for breakthrough pain             pantoprazole (PROTONIX) 40 MG EC tablet  Take 1 tablet (40 mg) by mouth 2 times daily (before meals)             rivaroxaban ANTICOAGULANT (XARELTO) 10 MG TABS tablet  Take 1 tablet (10 mg) by mouth daily (with dinner)             tamsulosin (FLOMAX) 0.4 MG capsule  Take 0.4 mg by mouth daily              VITAMIN A PO  Take 1 tablet by mouth daily                 Pharmacist assisted with medication reconciliation of discharge medications with PTA medications. MD was contacted with any questions/concerns -no. Attempted to reach  patient twice by calling the room phone and cell phone numbers - no reply, unable to complete education on rivaroxaban.

## 2021-05-27 NOTE — PLAN OF CARE
Temp: 96.6  F (35.9  C)  Temp src: Oral  BP: 108/58  Pulse: 72  Resp: 16  SpO2: 94 %  O2 Device: Nasal cannula 2L        Admit Date: 05/16/2021  Admitting Diagnosis: COVID-19  Pertinent History: Anxiety, GERD    Isolation Precautions:   Special Precautions for COVID-19.    Neuro: Alert and Oriented x4  Activity: are independent with no assistive devices   Telemetry Monitoring: No  Pain: denying pain.  Labs / Tests: Albumin 2.4, WBC 15.2  GI: WNL  : WNL  Medications: Completed Remdesivir on 5/20, completed decadron on 5/26, albuterol inhaler Q6H but states it does not help, lovenox injections Q24H  LDA's: Peripheral  Fluids: is Saline locked.  Diet: Regular  Living Situation: Home with wife  Consults: Respiratory and Nutrition  Discharge Disposition: Home with Self care + Home O2    Plan of Care: Monitor O2 sats, probably discharge home later today with home O2      Aileen Batres RN on 5/27/2021 at 6:20 AM

## 2021-05-27 NOTE — DISCHARGE SUMMARY
[facility]  Hospitalist Discharge Summary      Date of Admission:  5/16/2021  Date of Discharge:  5/27/2021  Discharging Provider: Aramis Hunter MD      Discharge Diagnoses   covid 19 pneumonia. Acute hypoxic respiratory failure.    Follow-ups Needed After Discharge   Follow-up Appointments     Discharge - COVID Patient Oximeter Discharge Instructions      COVID Patient Oximeter Discharge Instructions     Use the oximeter to measure the amount of oxygen in your blood.  Put the   clip on the tip of your pointer finger.  Turn on the device to measure   your oxygen level.  Do this at least 2 times a day. Do it more than 2   times if you feel short of breath.      It should be 90% or higher while you're at rest.  If your oxygen level is   89% or lower, change the position of the clip on your finger or try   another finger.  After 10 minutes if it's still 89% or lower, call 911/go   to the Emergency Department.       If your shortness of breath may be getting worse but the oximeter still   shows 90% or higher, call your doctor or clinic as soon as possible. If   you can't reach your doctor or clinic, or if your shortness of breath gets   very bad, call 911/go to the Emergency Department.    Note: Don't turn down your oxygen until you get instructions at your   virtual visit.         Discharge - COVID Patient Oximeter Discharge Instructions      COVID Patient Oximeter Discharge Instructions     Use the oximeter to measure the amount of oxygen in your blood.  Put the   clip on the tip of your pointer finger.  Turn on the device to measure   your oxygen level.  Do this at least 2 times a day. Do it more than 2   times if you feel short of breath.      It should be 90% or higher while you're at rest.  If your oxygen level is   89% or lower, change the position of the clip on your finger or try   another finger.  After 10 minutes if it's still 89% or lower, call 911/go   to the Emergency Department.        If your shortness of breath may be getting worse but the oximeter still   shows 90% or higher, call your doctor or clinic as soon as possible. If   you can't reach your doctor or clinic, or if your shortness of breath gets   very bad, call 911/go to the Emergency Department.    Note: Don't turn down your oxygen until you get instructions at your   virtual visit.         Follow-up and recommended labs and tests       Follow up with primary care provider, Melvi Hale, within 7 days for   hospital follow- up.  No follow up labs or test are needed.         Follow-up and recommended labs and tests       Follow up with primary care provider, Melvi Hale, within 7 days for   hospital follow- up.  No follow up labs or test are needed.             Unresulted Labs Ordered in the Past 30 Days of this Admission     No orders found from 4/16/2021 to 5/17/2021.      These results will be followed up by NA    Discharge Disposition   Discharged to home  Condition at discharge: Stable    Hospital Course   Pawel Vallejo is a 55-year-old male with a history of BPH admitted on 5/16 to the hospital service with acute hypoxic respiratory failure secondary to COVID-19 pneumonia. Still requiring 2.5 L O2 (down from 4), completed remdesivir.     Acute hypoxic respiratory failure due to covid 19 pneumonia    - symptoms started 8 days prior to the date of admission with covid + test on the same day (5/8)    - was in the ED on 5/12 and discharged home    - CT in ED: ground glass opacities    - completed 10 day course of decadron and 5 days of remdesivir.    - on lovenox for DVT prophylaxis    - incentive spirometry    - indicated he should self-prone: he is dong this    - added albuterol inhaler    - CT was repeated on 5/25, no PE    - Pro-ana laura and d-dimer were normal 5/24 and 5/23, respectively   >> patient has been doing well the past 48 hours. He is ready for discharge home. Will discharge with oxygen (likely will be able to  wean off pretty quickly). Will discharge with 1 mo xarelto.     Acid reflux symptoms    - indicated he should sit for meals and then walk around    - cont home omeprazole    - added Tums and Maalox     Transaminitis    - likely due to underlying viral infection vs remdesivir     - improved     BPH    - cont flomax    Patient feels well today. He took a shower. He really feels ready for discharge. I spoke with his wife and updated her. Once his meds are filled and oxygen arrives, he will discharge.    Consultations This Hospital Stay   SPIRITUAL HEALTH SERVICES IP CONSULT    Code Status   Full Code    Time Spent on this Encounter   I, Aramis Hunter MD, personally saw the patient today and spent greater than 30 minutes discharging this patient.       Aramis Hunter MD  [facility] OBSERVATION DEPT  [address] [place] [address]  Phone: [phone]  ______________________________________________________________________    Physical Exam   Vital Signs: Temp: 96.1  F (35.6  C) Temp src: Oral BP: 125/81 Pulse: 72   Resp: 20 SpO2: 92 % O2 Device: Nasal cannula Oxygen Delivery: 2 LPM  Weight: 170 lbs 14.4 oz  Constitutional: awake, alert, cooperative, no apparent distress, and appears stated age  Eyes: Lids and lashes normal, pupils equal, round and reactive to light, extra ocular muscles intact, sclera clear, conjunctiva normal  ENT: Normocephalic, without obvious abnormality, atraumatic, sinuses nontender on palpation, external ears without lesions, oral pharynx with moist mucous membranes, tonsils without erythema or exudates, gums normal and good dentition.  Respiratory: No increased work of breathing, good air exchange, clear to auscultation bilaterally, no crackles or wheezing  Cardiovascular: Normal apical impulse, regular rate and rhythm, normal S1 and S2, no S3 or S4, and no murmur noted  GI: No scars, normal bowel sounds, soft, non-distended, non-tender, no masses palpated, no  hepatosplenomegally  Skin: no bruising or bleeding  Musculoskeletal: no lower extremity pitting edema present       Primary Care Physician   Melvi Hale    Discharge Orders      COVID-19 GetWell Loop Referral      Reason for your hospital stay    covid 19 pneumonia     Contact provider    Contact your primary care provider if If increased trouble breathing, new arm/leg swelling, dizziness/passing out, falls, bleeding that doesn't stop, or uncontrolled pain.     Follow-up and recommended labs and tests     Follow up with primary care provider, Melvi Hale, within 7 days for hospital follow- up.  No follow up labs or test are needed.     Discharge - Quarantine/Isolation Instruction    Date of symptom onset:     Date of first positive test:    Stay home and away from others (self-isolate) for at least 20 days from when your symptoms started And...   You've had no fevers and no medicine that reduces fever for 1 full day (24 hours) And...   Your other symptoms have resolved (gotten better).     Discharge - COVID Patient Oximeter Discharge Instructions    COVID Patient Oximeter Discharge Instructions     Use the oximeter to measure the amount of oxygen in your blood.  Put the clip on the tip of your pointer finger.  Turn on the device to measure your oxygen level.  Do this at least 2 times a day. Do it more than 2 times if you feel short of breath.      It should be 90% or higher while you're at rest.  If your oxygen level is 89% or lower, change the position of the clip on your finger or try another finger.  After 10 minutes if it's still 89% or lower, call 911/go to the Emergency Department.       If your shortness of breath may be getting worse but the oximeter still shows 90% or higher, call your doctor or clinic as soon as possible. If you can't reach your doctor or clinic, or if your shortness of breath gets very bad, call 911/go to the Emergency Department.    Note: Don't turn down your oxygen until you get  instructions at your virtual visit.     Activity    Your activity upon discharge: activity as tolerated     Oxygen Adult/Peds    Oxygen Documentation:   I certify that this patient, Pawel Vallejo has been under my care (or a nurse practitioner or physican's assistant working with me). This is the face-to-face encounter for oxygen medical necessity.      Pawel Vallejo is now in a chronic stable state and continues to require supplemental oxygen. Patient has continued oxygen desaturation due to covid 19 pneumonia  Alternative treatment(s) tried or considered and deemed clinically infective for treatment of covid 19 pneumonia include inhalers and steroids.  If portability is ordered, is the patient mobile within the home? yes    **Patients who qualify for home O2 coverage under the CMS guidelines require ABG tests or O2 sat readings obtained closest to, but no earlier than 2 days prior to the discharge, as evidence of the need for home oxygen therapy. Testing must be performed while patient is in the chronic stable state. See notes for O2 sats.**    Patient has been assessed for Home Oxygen needs.  Oxygen readings:   *   RA - at rest 90 Pulse oximetry SPO2 %  *   RA - during activity/with exercise SPO2 87 %  *   O2 at 2 liters/minute (at rest) ...SPO2 93 %  *   O2 at 2 liters/minute (during activity/with exercise) ...SPO2 92 %        Diet    Follow this diet upon discharge: Orders Placed This Encounter      Snacks/Supplements Adult: Ensure Enlive; With Meals      Regular Diet Adult       Significant Results and Procedures   Most Recent 3 CBC's:  Recent Labs   Lab Test 05/26/21  0516 05/25/21  0515 05/24/21  0651   WBC 15.2* 16.8* 15.4*   HGB 13.6 14.4 14.7   MCV 92 92 91    329 305     Most Recent 3 BMP's:  Recent Labs   Lab Test 05/25/21  0515 05/24/21  0651 05/16/21  0959    135 139   POTASSIUM 4.5 4.8 4.0   CHLORIDE 101 103 107   CO2 28 27 27   BUN 26 22 11   CR 0.73 0.72 0.76   ANIONGAP 5 5 5    RASHI 8.0* 8.6 8.1*   * 111* 115*     Most Recent 2 LFT's:  Recent Labs   Lab Test 05/26/21  0516 05/22/21  0629   AST 55* 98*   * 317*   ALKPHOS 108 127   BILITOTAL 0.3 0.4   ,   Results for orders placed or performed during the hospital encounter of 05/16/21   XR Chest Port 1 View    Narrative    CHEST ONE VIEW PORTABLE May 16, 2021 11:15 AM     HISTORY: Dyspnea. Chest pain.    COMPARISON: 5/12/2021.      Impression    IMPRESSION: Mild hazy increased density in both lower lungs is new  since the previous exam, and could be infectious in etiology. Few tiny  calcified granulomas in both lungs are unchanged. No pneumothorax.  Heart size appears stable. Pulmonary vascularity is within normal  limits.    TICO POLK MD   CT Chest Pulmonary Embolism w Contrast    Narrative    CT CHEST PULMONARY EMBOLISM WITH CONTRAST 5/16/2021 1:48 PM    CLINICAL HISTORY: PE suspected, low/intermediate prob, positive  D-dimer. Shortness of breath.    TECHNIQUE: CT angiogram chest during arterial phase injection IV  contrast. 2D and 3D MIP reconstructions were performed by the CT  technologist. Dose reduction techniques were used.     CONTRAST: 71mL Isovue-370    COMPARISON: CT chest 9/6/2014.    FINDINGS:  ANGIOGRAM CHEST: Pulmonary arteries are normal caliber and negative  for pulmonary emboli. Thoracic aorta is negative for dissection.    LUNGS AND PLEURA: Patchy multifocal bilateral irregular groundglass  areas of consolidation mostly with a peripheral distribution involving  all lobes of both lungs. Bibasilar atelectasis. Trace bibasilar  pleural fluid.    MEDIASTINUM/AXILLAE: Calcified lymph nodes. A few minimally prominent  lymph nodes in the chest.    CORONARY ARTERY CALCIFICATION: Mild.    UPPER ABDOMEN: Normal.    MUSCULOSKELETAL: Normal.      Impression    IMPRESSION:  1.  Multifocal patchy bilateral irregular consolidation most  consistent with atypical pneumonia such as COVID-19 pneumonia.  2.  Trace  bibasilar pleural fluid.  3.  No evidence for pulmonary embolism.    MADELIN DANIEL MD   CT Chest Pulmonary Embolism w Contrast    Narrative    EXAM: CT CHEST PULMONARY EMBOLISM W CONTRAST  LOCATION: [facility]  DATE/TIME: 5/25/2021 5:42 PM    INDICATION: Increasing O2 needs with chest pain. COVID 19.  COMPARISON: 05/16/2021.  TECHNIQUE: CT chest pulmonary angiogram during arterial phase injection of IV contrast. Multiplanar reformats and MIP reconstructions were performed. Dose reduction techniques were used.   CONTRAST: 60 mL Isovue-370.    FINDINGS:  ANGIOGRAM CHEST: Pulmonary arteries are normal caliber and negative for pulmonary emboli. Thoracic aorta is negative for dissection. No CT evidence of right heart strain.    LUNGS AND PLEURA: Patchy multifocal bilateral groundglass infiltrates in a peripheral distribution again seen mildly progressed. Calcified granulomata. Bibasilar atelectasis. Pleural fluid nearly resolved.    MEDIASTINUM/AXILLAE: Calcified nodes.    CORONARY ARTERY CALCIFICATION: Mild.    UPPER ABDOMEN: Cholecystectomy.    MUSCULOSKELETAL: Normal.      Impression    IMPRESSION:  1.  No pulmonary embolism.    2.  Multifocal patchy regions of groundglass infiltrates predominantly peripheral with slight progression. The tiny pleural effusions have nearly resolved.    3.  Cholecystectomy.       Discharge Medications   Current Discharge Medication List      START taking these medications    Details   benzonatate (TESSALON) 100 MG capsule Take 1 capsule (100 mg) by mouth 3 times daily as needed for cough  Qty: 30 capsule, Refills: 0    Associated Diagnoses: 2019 novel coronavirus disease (COVID-19)      oxyCODONE (ROXICODONE) 5 MG tablet Take 1 tablet (5 mg) by mouth every 8 hours as needed for breakthrough pain  Qty: 10 tablet, Refills: 0    Associated Diagnoses: 2019 novel coronavirus disease (COVID-19)      pantoprazole (PROTONIX) 40 MG EC tablet Take 1 tablet (40 mg) by mouth 2 times  daily (before meals)  Qty: 60 tablet, Refills: 1    Associated Diagnoses: Gastroesophageal reflux disease without esophagitis      rivaroxaban ANTICOAGULANT (XARELTO) 10 MG TABS tablet Take 1 tablet (10 mg) by mouth daily (with dinner)  Qty: 30 tablet, Refills: 0    Associated Diagnoses: 2019 novel coronavirus disease (COVID-19); DVT prophylaxis         CONTINUE these medications which have NOT CHANGED    Details   acetaminophen (TYLENOL) 325 MG tablet Take 325-650 mg by mouth every 6 hours as needed for mild pain      albuterol (PROAIR HFA/PROVENTIL HFA/VENTOLIN HFA) 108 (90 Base) MCG/ACT inhaler Inhale 2 puffs into the lungs every 4 hours as needed for shortness of breath / dyspnea or wheezing    Comments: Pharmacy may dispense brand covered by insurance (Proair, or proventil or ventolin or generic albuterol inhaler)      Ascorbic Acid (VITAMIN C PO) Take 1 tablet by mouth daily      Cholecalciferol (VITAMIN D3 PO) Take 1 tablet by mouth daily      ibuprofen (ADVIL/MOTRIN) 100 MG tablet Take 100 mg by mouth every 4 hours as needed      KRILL OIL PO Take 1 capsule by mouth daily       Multiple Vitamins-Minerals (ZINC PO) Take 1 tablet by mouth daily      tamsulosin (FLOMAX) 0.4 MG capsule Take 0.4 mg by mouth daily       VITAMIN A PO Take 1 tablet by mouth daily      clobetasol (TEMOVATE) 0.05 % external ointment Apply topically nightly as needed To psoriasis on foot         STOP taking these medications       diphenhydrAMINE (BENADRYL) 50 MG capsule Comments:   Reason for Stopping:         metoclopramide (REGLAN) 10 MG tablet Comments:   Reason for Stopping:         predniSONE (DELTASONE) 20 MG tablet Comments:   Reason for Stopping:             Allergies   No Known Allergies

## 2021-05-27 NOTE — PLAN OF CARE
"Vitals:/74 (BP Location: Left arm)   Pulse 75   Temp 96.5  F (35.8  C) (Oral)   Resp 16   Ht 1.778 m (5' 10\")   Wt 77.5 kg (170 lb 14.4 oz)   SpO2 94%   BMI 24.52 kg/m    Labs:None drawn today  Cardiac:WDL  Resp: dyspnea on exertion continuing to improve, oxygen drops when pt is ambulatory without oxygen, see home oxygen note   Neuro:WDL  GI:WDL, LBM 5/26  :WDL  Skin:WDL  Activity:Up Ind in room   Diet:Reg  Pain:headache 6/10, improved to 4/10 with oxycodone  Plan:Discharge with home oxygen and follow up with PCP    "

## 2021-05-27 NOTE — PROGRESS NOTES
Progress Note  Oxygen Documentation:   I certify that this patient, Pawel Vallejo has been under my care (or a nurse practitioner or physican's assistant working with me). This is the face-to-face encounter for oxygen medical necessity.      Pawel Vallejo is now in a chronic stable state and continues to require supplemental oxygen. Patient has continued oxygen desaturation due to covid 19 pneumonia  Alternative treatment(s) tried or considered and deemed clinically infective for treatment of covid 19 pneumonia include inhalers and steroids.  If portability is ordered, is the patient mobile within the home? yes    **Patients who qualify for home O2 coverage under the CMS guidelines require ABG tests or O2 sat readings obtained closest to, but no earlier than 2 days prior to the discharge, as evidence of the need for home oxygen therapy. Testing must be performed while patient is in the chronic stable state. See notes for O2 sats.**    Patient has been assessed for Home Oxygen needs.  Oxygen readings:   *   RA - at rest 90 Pulse oximetry SPO2 %  *   RA - during activity/with exercise SPO2 87 %  *   O2 at 2 liters/minute (at rest) ...SPO2 93 %  *   O2 at 2 liters/minute (during activity/with exercise) ...SPO2 92 %     Aramis Hunter MD

## 2021-05-29 ENCOUNTER — APPOINTMENT (OUTPATIENT)
Dept: GENERAL RADIOLOGY | Facility: CLINIC | Age: 56
End: 2021-05-29
Attending: EMERGENCY MEDICINE
Payer: COMMERCIAL

## 2021-05-29 ENCOUNTER — HOSPITAL ENCOUNTER (EMERGENCY)
Facility: CLINIC | Age: 56
Discharge: HOME | End: 2021-05-29
Attending: EMERGENCY MEDICINE | Admitting: EMERGENCY MEDICINE
Payer: COMMERCIAL

## 2021-05-29 VITALS
OXYGEN SATURATION: 97 % | TEMPERATURE: 98.6 F | BODY MASS INDEX: 25.22 KG/M2 | WEIGHT: 175.8 LBS | SYSTOLIC BLOOD PRESSURE: 101 MMHG | RESPIRATION RATE: 20 BRPM | DIASTOLIC BLOOD PRESSURE: 72 MMHG | HEART RATE: 82 BPM

## 2021-05-29 DIAGNOSIS — R07.9 CHEST PAIN, UNSPECIFIED TYPE: ICD-10-CM

## 2021-05-29 DIAGNOSIS — R79.89 ELEVATED LFTS: ICD-10-CM

## 2021-05-29 DIAGNOSIS — G89.29 CHRONIC INTRACTABLE HEADACHE, UNSPECIFIED HEADACHE TYPE: ICD-10-CM

## 2021-05-29 DIAGNOSIS — R51.9 CHRONIC INTRACTABLE HEADACHE, UNSPECIFIED HEADACHE TYPE: ICD-10-CM

## 2021-05-29 DIAGNOSIS — U07.1 PNEUMONIA DUE TO 2019 NOVEL CORONAVIRUS: ICD-10-CM

## 2021-05-29 DIAGNOSIS — R06.00 DYSPNEA, UNSPECIFIED TYPE: ICD-10-CM

## 2021-05-29 DIAGNOSIS — J12.82 PNEUMONIA DUE TO 2019 NOVEL CORONAVIRUS: ICD-10-CM

## 2021-05-29 LAB
ALBUMIN SERPL-MCNC: 2.8 G/DL (ref 3.4–5)
ALP SERPL-CCNC: 118 U/L (ref 40–150)
ALT SERPL W P-5'-P-CCNC: 253 U/L (ref 0–70)
ANION GAP SERPL CALCULATED.3IONS-SCNC: 6 MMOL/L (ref 3–14)
AST SERPL W P-5'-P-CCNC: 107 U/L (ref 0–45)
BASOPHILS # BLD AUTO: 0.1 10E9/L (ref 0–0.2)
BASOPHILS NFR BLD AUTO: 0.5 %
BILIRUB SERPL-MCNC: 0.4 MG/DL (ref 0.2–1.3)
BUN SERPL-MCNC: 25 MG/DL (ref 7–30)
CALCIUM SERPL-MCNC: 8.3 MG/DL (ref 8.5–10.1)
CHLORIDE SERPL-SCNC: 99 MMOL/L (ref 94–109)
CO2 SERPL-SCNC: 28 MMOL/L (ref 20–32)
CREAT SERPL-MCNC: 0.85 MG/DL (ref 0.66–1.25)
DIFFERENTIAL METHOD BLD: ABNORMAL
EOSINOPHIL # BLD AUTO: 0.1 10E9/L (ref 0–0.7)
EOSINOPHIL NFR BLD AUTO: 0.4 %
ERYTHROCYTE [DISTWIDTH] IN BLOOD BY AUTOMATED COUNT: 13.7 % (ref 10–15)
GFR SERPL CREATININE-BSD FRML MDRD: >90 ML/MIN/{1.73_M2}
GLUCOSE SERPL-MCNC: 134 MG/DL (ref 70–99)
HCT VFR BLD AUTO: 45 % (ref 40–53)
HGB BLD-MCNC: 15.2 G/DL (ref 13.3–17.7)
IMM GRANULOCYTES # BLD: 0.5 10E9/L (ref 0–0.4)
IMM GRANULOCYTES NFR BLD: 3.3 %
LYMPHOCYTES # BLD AUTO: 1.8 10E9/L (ref 0.8–5.3)
LYMPHOCYTES NFR BLD AUTO: 11.8 %
MCH RBC QN AUTO: 30.8 PG (ref 26.5–33)
MCHC RBC AUTO-ENTMCNC: 33.8 G/DL (ref 31.5–36.5)
MCV RBC AUTO: 91 FL (ref 78–100)
MONOCYTES # BLD AUTO: 1.2 10E9/L (ref 0–1.3)
MONOCYTES NFR BLD AUTO: 7.6 %
NEUTROPHILS # BLD AUTO: 11.8 10E9/L (ref 1.6–8.3)
NEUTROPHILS NFR BLD AUTO: 76.4 %
NRBC # BLD AUTO: 0 10*3/UL
NRBC BLD AUTO-RTO: 0 /100
NT-PROBNP SERPL-MCNC: 50 PG/ML (ref 0–900)
PLATELET # BLD AUTO: 273 10E9/L (ref 150–450)
POTASSIUM SERPL-SCNC: 3.7 MMOL/L (ref 3.4–5.3)
PROT SERPL-MCNC: 7 G/DL (ref 6.8–8.8)
RBC # BLD AUTO: 4.94 10E12/L (ref 4.4–5.9)
SODIUM SERPL-SCNC: 133 MMOL/L (ref 133–144)
TROPONIN I SERPL-MCNC: <0.015 UG/L (ref 0–0.04)
WBC # BLD AUTO: 15.4 10E9/L (ref 4–11)

## 2021-05-29 PROCEDURE — 84484 ASSAY OF TROPONIN QUANT: CPT | Performed by: EMERGENCY MEDICINE

## 2021-05-29 PROCEDURE — 85025 COMPLETE CBC W/AUTO DIFF WBC: CPT | Performed by: EMERGENCY MEDICINE

## 2021-05-29 PROCEDURE — 80053 COMPREHEN METABOLIC PANEL: CPT | Performed by: EMERGENCY MEDICINE

## 2021-05-29 PROCEDURE — 93005 ELECTROCARDIOGRAM TRACING: CPT

## 2021-05-29 PROCEDURE — 99285 EMERGENCY DEPT VISIT HI MDM: CPT | Mod: 25

## 2021-05-29 PROCEDURE — 250N000013 HC RX MED GY IP 250 OP 250 PS 637: Performed by: EMERGENCY MEDICINE

## 2021-05-29 PROCEDURE — 71045 X-RAY EXAM CHEST 1 VIEW: CPT

## 2021-05-29 PROCEDURE — 83880 ASSAY OF NATRIURETIC PEPTIDE: CPT | Performed by: EMERGENCY MEDICINE

## 2021-05-29 RX ORDER — OXYCODONE HYDROCHLORIDE 5 MG/1
10 TABLET ORAL ONCE
Status: COMPLETED | OUTPATIENT
Start: 2021-05-29 | End: 2021-05-29

## 2021-05-29 RX ADMIN — OXYCODONE HYDROCHLORIDE 10 MG: 5 TABLET ORAL at 11:03

## 2021-05-29 ASSESSMENT — ENCOUNTER SYMPTOMS
DYSURIA: 0
WEAKNESS: 0
LIGHT-HEADEDNESS: 1
HEADACHES: 1
FEVER: 0
DIFFICULTY URINATING: 0
FATIGUE: 1
DIARRHEA: 0
SHORTNESS OF BREATH: 1
NUMBNESS: 0
PHOTOPHOBIA: 0
APNEA: 1

## 2021-05-29 NOTE — ED PROVIDER NOTES
History   Chief Complaint:  Shortness of Breath     The history is provided by the patient.      Pawel Vallejo is a 55 year old male with history of COVID 19 who presents with shortness of breath. The patient reports that he was discharged 2 days ago after being admitted on May 9th for COVID with fever to 105, shortness of breath, troubles breathing and hypoxia. He states that this morning he woke up and had an elevated temperature to 99.4 with increased troubles breathing and hypoxic on 2L of oxygen with sats that dropped to 65, 75, 72 and so they then tried to increase his supplemental oxygen but still felt shortness of breath. He states that he has a new head pain as well that radiates down his neck to his left chest. He does state that he had a headache and lightheadedness with his course of COVID. He denies any diarrhea or urine changes. He denies double vision, one sided weakness or blurry vision. He denies current tobacco use but was a former smoker around 8 years ago. He was put on a course of Remdesivir that he has finished and is still on his blood thinners. His wife states that he last took oxycodone at 0700. He denies a history of asthma, or leg swelling.    Review of Systems   Constitutional: Positive for fatigue. Negative for fever.   Eyes: Negative for photophobia and visual disturbance.   Respiratory: Positive for apnea and shortness of breath.    Cardiovascular: Positive for chest pain. Negative for leg swelling.   Gastrointestinal: Negative for diarrhea.   Genitourinary: Negative for difficulty urinating and dysuria.   Neurological: Positive for light-headedness and headaches. Negative for weakness and numbness.   All other systems reviewed and are negative.      Allergies:  No known drug allergies     Medications:  Albuterol inhaler   Protonix   Xarelto     Past Medical History:    BPH   Chronic back pain   Hyperlipidemia  Hypertension   GERD  Pleurisy  COVID 19 5/16/2021  Adenomatous polyp  of colon   Cervical spondylosis   Complete tear of left rotator cuff  Diastasis recti  Umbilical hernia  Anxiety   IBS    Past Surgical History:    Cholecystectomy   Decompression cervical fusion, left anterior   Graft bone form iliac crest, left   Hernia repair   Left shoulder arthroscopic rotator cuff repair     Family History:    Cancer  Diabetes  Stroke   Crohn's disease    Social History:  Smoking status: former smoker  PCP: Melvi Hale  Presents to the ED with his wife    Physical Exam     Patient Vitals for the past 24 hrs:   BP Temp Temp src Pulse Resp SpO2 Weight   05/29/21 1100 101/72 -- -- 82 -- 97 % --   05/29/21 1059 -- -- -- -- -- -- 79.7 kg (175 lb 12.8 oz)   05/29/21 1045 105/73 -- -- -- -- 98 % --   05/29/21 1029 104/74 98.6  F (37  C) Temporal 86 20 99 % --       Physical Exam  General: Adult male sitting upright  Eyes: PERRL, Conjunctive within normal limits  ENT: Moist mucous membranes, oropharynx clear.   CV: Normal S1S2, no murmur, rub or gallop. Regular rate and rhythm  Resp: Clear to auscultation bilaterally, no wheezes, rales or rhonchi. Normal respiratory effort.  GI: Abdomen is soft, nontender and nondistended. No palpable masses. No rebound or guarding.  MSK: No edema. Nontender. Normal active range of motion.  Skin: Warm and dry. No rashes or lesions or ecchymoses on visible skin.  Neuro: Alert and oriented. Responds appropriately to all questions and commands. No focal findings appreciated. Normal muscle tone.  Psych: Normal mood and affect.     Emergency Department Course     ECG:  ECG taken at 1045, ECG read at 1059  Normal sinus rhythm  Normal ECG  Rate 90 bpm. MS interval 152 ms. QRS duration 90 ms. QT/QTc 342/418 ms. P-R-T axes 61 27 54.    Imaging:  Chest  XR PORT:  Portable view of the chest. Patchy bilateral airspace   opacities are present concerning for viral pneumonitis. Heart is   normal in size. No pneumothorax or pleural effusions.     Reading per radiology      Laboratory:  CBC: WBC 15.4(H), HGB 15.2,    CMP: Glucose 134(H), Calcium 8.3(L), Albumin 2.8(L),  (H), (H) o/w WNL (Creatinine: 0.85)    Troponin (Collected 1047): <0.015  BNP: 50    Emergency Department Course:  Reviewed:  I reviewed the patient's nursing notes, vitals, past medical records, Care Everywhere.     Assessments:  1043 I performed an assessment and examination of the patient as noted above.      1135 Findings and plan explained to the Patient.  He denies any new concerns.  Patient discharged home with instructions regarding supportive care, medications, and reasons to return. The importance of close follow-up was reviewed.     Interventions:  1103 Oxycodone 10 mg, PO     Disposition:  The patient was discharged to home.       Impression & Plan   Medical Decision Making:  Pawel Vallejo is a 55 year old male with known COVID-19 pneumonia recently discharged from the hospital on oxygen who presents emergency department with slight increase in oxygen needs and slight elevation of body temperature.  He was worried about the elevation of his temperature.  Here he has baseline oxygen needs at 2 L.  His oxygenation is 95 to 98% throughout.  He was afebrile here.  He has had no new symptoms noting ongoing but improved left-sided chest pain and ongoing headache.  He has no focal neurologic deficits and no stroke symptoms.  No clinical signs or symptoms to suggest elevated intracranial pressure.  There does not seem emergent indication for advanced head imaging.  His laboratory evaluation shows stable leukocytosis and ongoing elevation of LFTs but no worrisome change.  His chest x-ray does not show evidence of focal pneumonia and I do not suspect at this time superimposed bacterial infection.  He overall had improvement in pain with medications here.  He has these pain medications at home.  He has Covid get well loop in place.  He has follow-up with his PCP in a few days.  He feels  comfortable to plan for discharge home, noting a preference for this rather than admission.  This seems reasonable.  He should return should he worsen.  All questions were answered prior to discharge.      Covid-19  Pawel Vallejo was evaluated during a global COVID-19 pandemic, which necessitated consideration that the patient might be at risk for infection with the SARS-CoV-2 virus that causes COVID-19.   Applicable protocols for evaluation were followed during the patient's care.       Diagnosis:    ICD-10-CM    1. Pneumonia due to 2019 novel coronavirus  U07.1     J12.82    2. Dyspnea, unspecified type  R06.00    3. Chronic intractable headache, unspecified headache type  R51.9     G89.29    4. Chest pain, unspecified type  R07.9    5. Elevated LFTs  R79.89        Scribe Disclosure:  VON, Louise Ramires, am serving as a scribe at 10:43 AM on 5/29/2021 to document services personally performed by Mel Sharma MD based on my observations and the provider's statements to me.        Mel Sharma MD  05/29/21 6618

## 2021-05-29 NOTE — DISCHARGE INSTRUCTIONS
Discharge Instructions  Headache    You were seen today for a headache. Headaches may be caused by many different things such as muscle tension, sinus inflammation, anxiety and stress, having too little sleep, too much alcohol, some medical conditions or injury. You may have a migraine, which is caused by changes in the blood vessels in your head.  At this time your provider does not find that your headache is a sign of anything dangerous or life-threatening.  However, sometimes the signs of serious illness do not show up right away.      Generally, every Emergency Department visit should have a follow-up clinic visit with either a primary or a specialty clinic/provider. Please follow-up as instructed by your emergency provider today.    Return to the Emergency Department if:  You get a new fever of 100.4 F or higher.  Your headache gets much worse.  You get a stiff neck with your headache.  You get a new headache that is significantly different or worse than headaches you have had before.  You are vomiting (throwing up) and cannot keep food or water down.  You have blurry or double vision or other problems with your eyes.  You have a new weakness on one side of your body.  You have difficulty with balance which is new.  You or your family thinks you are confused.  You have a seizure.    What can I do to help myself?  Pain medications - You may take a pain medication such as Tylenol  (acetaminophen), Advil , Motrin  (ibuprofen) or Aleve  (naproxen).  Take a pain reliever as soon as you notice symptoms.  Starting medications as soon as you start to have symptoms may lessen the amount of pain you have.  Relaxing in a quiet, dark room may help.  Get enough sleep and eat meals regularly.  You may need to watch for certain foods or other things which may trigger your headaches.  Keeping a journal of your headaches and possible triggers may help you and your primary provider to identify things which you should avoid which  may be causing your headaches.  If you were given a prescription for medicine here today, be sure to read all of the information (including the package insert) that comes with your prescription.  This will include important information about the medicine, its side effects, and any warnings that you need to know about.  The pharmacist who fills the prescription can provide more information and answer questions you may have about the medicine.  If you have questions or concerns that the pharmacist cannot address, please call or return to the Emergency Department.   Remember that you can always come back to the Emergency Department if you are not able to see your regular provider in the amount of time listed above, if you get any new symptoms, or if there is anything that worries you.     The elevation in your white blood cell count and mild elevation of liver function tests are essentially unchanged but should be rechecked with your primary clinic on follow-up.

## 2021-05-29 NOTE — ED TRIAGE NOTES
Presents with with increased oxygen dependence, SOB worsening on exertion and low grade fevers at home. Visually demonstrating labored breathing in triage. 98% on 2.5 lpm at this time. Recently discharged. Hx of covid may 9th.

## 2021-06-01 LAB — INTERPRETATION ECG - MUSE: NORMAL

## 2021-06-05 ENCOUNTER — NURSE TRIAGE (OUTPATIENT)
Dept: NURSING | Facility: CLINIC | Age: 56
End: 2021-06-05

## 2021-06-05 NOTE — TELEPHONE ENCOUNTER
"Patient's wife calling reporting patient has a nose bleed and had about \"small cup\" of blood loss. States they have tried pinching the nose and the bleeding is not stopping for the past 18-20 minutes. Reports patient is on Oxygen and patient's O2 saturation has dropped down to 70s due to patient pinching his nose and unable to get his O2 in. Per guideline, advised patient to call 911. Patient's wife Ann will assist calling 911 for patient.     Mich Gutierrez RN  [facility]     COVID 19 Nurse Triage Plan/Patient Instructions    Please be aware that novel coronavirus (COVID-19) may be circulating in the community. If you develop symptoms such as fever, cough, or SOB or if you have concerns about the presence of another infection including coronavirus (COVID-19), please contact your health care provider or visit [email or web address].     Disposition/Instructions    Call to EMS/911 recommended. Follow protocol based instructions.     Bring Your Own Device:  Please also bring your smart device(s) (smart phones, tablets, laptops) and their charging cables for your personal use and to communicate with your care team during your visit.    Thank you for taking steps to prevent the spread of this virus.  o Limit your contact with others.  o Wear a simple mask to cover your cough.  o Wash your hands well and often.    Resources    [facility]: About COVID-19: [email or web address]    CDC: What to Do If You're Sick: www.cdc.gov/coronavirus/2019-ncov/about/steps-when-sick.html    CDC: Ending Home Isolation: www.cdc.gov/coronavirus/2019-ncov/hcp/disposition-in-home-patients.html     CDC: Caring for Someone: www.cdc.gov/coronavirus/2019-ncov/if-you-are-sick/care-for-someone.html     [facility]: Interim Guidance for Hospital Discharge to Home: www.health.[facility].mn.us/diseases/coronavirus/hcp/hospdischarge.pdf    [facility] clinical trials (COVID-19 research studies): " [email or web address]-trials     Below are the COVID-19 hotlines at the [place] Department of [facility] ([facility]). Interpreters are available.   o For health questions: Call 348-927-7996 or 1-889.547.7504 (7 a.m. to 7 p.m.)  o For questions about schools and childcare: Call 192-377-3574 or 1-753.702.4380 (7 a.m. to 7 p.m.)                       Reason for Disposition    Sounds like a life-threatening emergency to the triager    Additional Information    Negative: Fainted or too weak to stand following large blood loss    Protocols used: [facility]-AH

## 2021-06-07 DIAGNOSIS — M54.12 CERVICAL RADICULOPATHY: ICD-10-CM

## 2021-06-11 ENCOUNTER — TRANSCRIBE ORDERS (OUTPATIENT)
Dept: OTHER | Age: 56
End: 2021-06-11

## 2021-06-11 DIAGNOSIS — Z86.16 PERSONAL HISTORY OF COVID-19: Primary | ICD-10-CM

## 2021-07-03 DIAGNOSIS — M54.12 CERVICAL RADICULOPATHY: ICD-10-CM

## 2021-07-05 ENCOUNTER — VIRTUAL VISIT (OUTPATIENT)
Dept: NEUROLOGY | Facility: CLINIC | Age: 56
End: 2021-07-05
Attending: PEDIATRICS
Payer: COMMERCIAL

## 2021-07-05 DIAGNOSIS — Z86.16 PERSONAL HISTORY OF COVID-19: ICD-10-CM

## 2021-07-05 PROCEDURE — 99207 PR SATISFY VISIT NUMBER: CPT | Performed by: PSYCHIATRY & NEUROLOGY

## 2021-07-08 DIAGNOSIS — M54.12 CERVICAL RADICULOPATHY: Primary | ICD-10-CM

## 2021-07-13 ENCOUNTER — OFFICE VISIT (OUTPATIENT)
Dept: ORTHOPEDICS | Facility: CLINIC | Age: 56
End: 2021-07-13
Payer: COMMERCIAL

## 2021-07-13 VITALS — WEIGHT: 180 LBS | BODY MASS INDEX: 25.77 KG/M2 | HEIGHT: 70 IN

## 2021-07-13 DIAGNOSIS — M54.12 CERVICAL RADICULOPATHY: Primary | ICD-10-CM

## 2021-07-13 PROCEDURE — 99213 OFFICE O/P EST LOW 20 MIN: CPT | Performed by: ORTHOPAEDIC SURGERY

## 2021-07-13 ASSESSMENT — MIFFLIN-ST. JEOR: SCORE: 1647.72

## 2021-07-13 NOTE — LETTER
7/13/2021         RE: Pawel Vallejo  [address] [address]        Dear Colleague,    Thank you for referring your patient, Pawel Vallejo, to the [facility] [place]. Please see a copy of my visit note below.    Spine Surgery Return Clinic Visit      Chief Complaint:   No chief complaint on file.      Interval HPI:  Symptom Profile Including: location of symptoms, onset, severity, exacerbating/alleviating factors, previous treatments:        Pawel Vallejo is a 55 year old male who underwent Cervical 4 to 6 Anterior Cervical Decompression and Fusion with Medtronic Plate and Screws, Interbody Device (DOS 12/31/20) for left C6 radicular complaints. He was last seen in April for right neck and periscapular pain that appeared to be muscular in origin. He was prescribed tizanidine and was referred to physical therapy to work on periscapular strengthening and stabilizing.     He has been doing really well.  He says he has minimal neck pain.  Unfortunately he was hospitalized for over a month with a Covid diagnosis.  He is currently on oxygen still recovering from a pulmonary status and he notes he did get deconditioned from this.  However he says his neck and arm feel great.          Past Medical History:     Past Medical History:   Diagnosis Date     BPH (benign prostatic hyperplasia)      Chronic back pain      Gastroesophageal reflux disease with esophagitis      History of hypertension      HLD (hyperlipidemia)      Pleurisy             Past Surgical History:     Past Surgical History:   Procedure Laterality Date     CHOLECYSTECTOMY       DECOMPRESSION, FUSION CERVICAL ANTERIOR TWO LEVELS, COMBINED Left 12/31/2020    Procedure: Cervical 4 to 6 Anterior Cervical Decompression and Fusion with Medtronic Plate and Screws, Interbody Device, use of Fluoroscopy, Microscope;  Surgeon: Pawel Irizarry MD;  Location: [facility] OR     GRAFT BONE FROM ILIAC  CREST Left 2020    Procedure: and Left Sided Iliac Crest Autograft;  Surgeon: Pawel Irizarry MD;  Location: [facility] OR     HERNIA REPAIR      Diastasis recti/ abdominal     LEFT shoulder arthroscopic rotator cuff repair, subacromial decompression and bicep tenodesis              Social History:     Social History     Tobacco Use     Smoking status: Former Smoker     Packs/day: 0.25     Years: 30.00     Pack years: 7.50     Quit date: 10/2020     Years since quittin.7     Smokeless tobacco: Never Used     Tobacco comment: quit in . restarted in the past couple of years and then smoked more socially.   Substance Use Topics     Alcohol use: Yes     Comment: socially            Family History:   No family history on file.         Allergies:   No Known Allergies         Medications:     Current Outpatient Medications   Medication     acetaminophen (TYLENOL) 325 MG tablet     albuterol (PROAIR HFA/PROVENTIL HFA/VENTOLIN HFA) 108 (90 Base) MCG/ACT inhaler     Ascorbic Acid (VITAMIN C PO)     benzonatate (TESSALON) 100 MG capsule     benzonatate (TESSALON) 100 MG capsule     Cholecalciferol (VITAMIN D3 PO)     clobetasol (TEMOVATE) 0.05 % external ointment     ibuprofen (ADVIL/MOTRIN) 100 MG tablet     KRILL OIL PO     Multiple Vitamins-Minerals (ZINC PO)     oxyCODONE (ROXICODONE) 5 MG tablet     pantoprazole (PROTONIX) 40 MG EC tablet     pantoprazole (PROTONIX) 40 MG EC tablet     rivaroxaban ANTICOAGULANT (XARELTO) 10 MG TABS tablet     tamsulosin (FLOMAX) 0.4 MG capsule     tiZANidine (ZANAFLEX) 4 MG tablet     VITAMIN A PO     No current facility-administered medications for this visit.             Review of Systems:   A focused musculoskeletal and neurologic ROS was performed with pertinent positives and negatives noted in the HPI.  Additional systems were also reviewed and are documented at the bottom of the note.         Physical Exam:   Vitals: There were no vitals taken for this  visit.  Musculoskeletal, Neurologic, and Spine:     Incision c/d/I, no drainage, erythema noted    Cervical spine:    Appearance -no gross step-offs, kyphosis.    Motor -     C5: Deltoids R 5/5 and L 5/5 strength    C6: Biceps R 5/5 and L 5/5 strength     C7: Triceps R 5/5 and L 5/5 strength     C8:  R 5/5 and L 5/5 strength         Sensation: intact to light touch in C5-T1                Imaging:       None new today.  I ordered a new CT scan at today's visit but it was not yet available for review     Assessment and Plan:     55 year old male with healing cervical fusion.  Is currently recovering from Covid.     Plan:  I will review the CT scan when it is a available.  If it shows a solid fusion I get discussed we could advance his activities as tolerated he could return to golf.  I will send him a [employer] message once the CT is available.    Sincerely,        Pawel Irizarry MD

## 2021-07-13 NOTE — NURSING NOTE
"Reason For Visit:   Chief Complaint   Patient presents with     RECHECK     follow up on cervical surgery 3 month since last visit        Primary MD: Melvi Hale  Ref. MD: Self     ?  No.  Date of surgery: Dr. Irizarry   Type of surgery: Dr. Irizarry .  Smoker: No  Request smoking cessation information: No    Ht 1.778 m (5' 10\")   Wt 81.6 kg (180 lb)   BMI 25.83 kg/m           Oswestry ([place]) Questionnaire    No flowsheet data found.         Neck Disability Index (NDI) Questionnaire    Neck Disability Index (NDI) 2/9/2021   Neck Disability Index: Count 7   NDI: Total Score = SUM (points for all 10 findings) 3   Neck Disability in Percent = (Total Score) / 50 * 100 8.57 (%)                   Promis 10 Assessment    PROMIS 10 2/9/2021   In general, would you say your health is: Good   In general, would you say your quality of life is: Good   In general, how would you rate your physical health? Good   In general, how would you rate your mental health, including your mood and your ability to think? Very good   In general, how would you rate your satisfaction with your social activities and relationships? Very good   In general, please rate how well you carry out your usual social activities and roles Very good   To what extent are you able to carry out your everyday physical activities such as walking, climbing stairs, carrying groceries, or moving a chair? Mostly   How often have you been bothered by emotional problems such as feeling anxious, depressed or irritable? Never   How would you rate your fatigue on average? Mild   How would you rate your pain on average?   0 = No Pain  to  10 = Worst Imaginable Pain 1   In general, would you say your health is: 3   In general, would you say your quality of life is: 3   In general, how would you rate your physical health? 3   In general, how would you rate your mental health, including your mood and your ability to think? 4   In general, how would you rate your " satisfaction with your social activities and relationships? 4   In general, please rate how well you carry out your usual social activities and roles. (This includes activities at home, at work and in your community, and responsibilities as a parent, child, spouse, employee, friend, etc.) 4   To what extent are you able to carry out your everyday physical activities such as walking, climbing stairs, carrying groceries, or moving a chair? 4   In the past 7 days, how often have you been bothered by emotional problems such as feeling anxious, depressed, or irritable? 1   In the past 7 days, how would you rate your fatigue on average? 2   In the past 7 days, how would you rate your pain on average, where 0 means no pain, and 10 means worst imaginable pain? 1   Global Mental Health Score 16   Global Physical Health Score 15   PROMIS TOTAL - SUBSCORES 31   Some recent data might be hidden                Carlos Yoo ATC

## 2021-07-13 NOTE — PROGRESS NOTES
Spine Surgery Return Clinic Visit      Chief Complaint:   No chief complaint on file.      Interval HPI:  Symptom Profile Including: location of symptoms, onset, severity, exacerbating/alleviating factors, previous treatments:        Pawel Vallejo is a 55 year old male who underwent Cervical 4 to 6 Anterior Cervical Decompression and Fusion with Medtronic Plate and Screws, Interbody Device (DOS 12/31/20) for left C6 radicular complaints. He was last seen in April for right neck and periscapular pain that appeared to be muscular in origin. He was prescribed tizanidine and was referred to physical therapy to work on periscapular strengthening and stabilizing.     He has been doing really well.  He says he has minimal neck pain.  Unfortunately he was hospitalized for over a month with a Covid diagnosis.  He is currently on oxygen still recovering from a pulmonary status and he notes he did get deconditioned from this.  However he says his neck and arm feel great.          Past Medical History:     Past Medical History:   Diagnosis Date     BPH (benign prostatic hyperplasia)      Chronic back pain      Gastroesophageal reflux disease with esophagitis      History of hypertension      HLD (hyperlipidemia)      Pleurisy             Past Surgical History:     Past Surgical History:   Procedure Laterality Date     CHOLECYSTECTOMY       DECOMPRESSION, FUSION CERVICAL ANTERIOR TWO LEVELS, COMBINED Left 12/31/2020    Procedure: Cervical 4 to 6 Anterior Cervical Decompression and Fusion with Medtronic Plate and Screws, Interbody Device, use of Fluoroscopy, Microscope;  Surgeon: Pawel Irizarry MD;  Location: [facility] OR     GRAFT BONE FROM ILIAC CREST Left 12/31/2020    Procedure: and Left Sided Iliac Crest Autograft;  Surgeon: Pawel Irizarry MD;  Location: [facility] OR     HERNIA REPAIR      Diastasis recti/ abdominal     LEFT shoulder arthroscopic rotator cuff repair[facility]ac[facility]ial decomp[facility]              Social History:     Social History     Tobacco Use     Smoking status: Former Smoker     Packs/day: 0.25     Years: 30.00     Pack years: 7.50     Quit date: 10/2020     Years since quittin.7     Smokeless tobacco: Never Used     Tobacco comment: quit in . restarted in the past couple of years and then smoked more socially.   Substance Use Topics     Alcohol use: Yes     Comment: socially            Family History:   No family history on file.         Allergies:   No Known Allergies         Medications:     Current Outpatient Medications   Medication     acetaminophen (TYLENOL) 325 MG tablet     albuterol (PROAIR HFA/PROVENTIL HFA/VENTOLIN HFA) 108 (90 Base) MCG/ACT inhaler     Ascorbic Acid (VITAMIN C PO)     benzonatate (TESSALON) 100 MG capsule     benzonatate (TESSALON) 100 MG capsule     Cholecalciferol (VITAMIN D3 PO)     clobetasol (TEMOVATE) 0.05 % external ointment     ibuprofen (ADVIL/MOTRIN) 100 MG tablet     KRILL OIL PO     Multiple Vitamins-Minerals (ZINC PO)     oxyCODONE (ROXICODONE) 5 MG tablet     pantoprazole (PROTONIX) 40 MG EC tablet     pantoprazole (PROTONIX) 40 MG EC tablet     rivaroxaban ANTICOAGULANT (XARELTO) 10 MG TABS tablet     tamsulosin (FLOMAX) 0.4 MG capsule     tiZANidine (ZANAFLEX) 4 MG tablet     VITAMIN A PO     No current facility-administered medications for this visit.             Review of Systems:   A focused musculoskeletal and neurologic ROS was performed with pertinent positives and negatives noted in the HPI.  Additional systems were also reviewed and are documented at the bottom of the note.         Physical Exam:   Vitals: There were no vitals taken for this visit.  Musculoskeletal, Neurologic, and Spine:     Incision c/d/I, no drainage, erythema noted    Cervical spine:    Appearance -no gross step-offs, kyphosis.    Motor -     C5: Deltoids R 5/5 and L 5/5 strength    C6: Biceps R 5/5 and L 5/5 strength     C7: Triceps R 5/5 and L  5/5 strength     C8:  R 5/5 and L 5/5 strength         Sensation: intact to light touch in C5-T1                Imaging:       None new today.  I ordered a new CT scan at today's visit but it was not yet available for review     Assessment and Plan:     55 year old male with healing cervical fusion.  Is currently recovering from Covid.     Plan:  I will review the CT scan when it is a available.  If it shows a solid fusion I get discussed we could advance his activities as tolerated he could return to golf.  I will send him a [employer] message once the CT is available.

## 2021-07-15 ENCOUNTER — HOSPITAL ENCOUNTER (OUTPATIENT)
Dept: CT IMAGING | Facility: CLINIC | Age: 56
Discharge: HOME | End: 2021-07-15
Attending: ORTHOPAEDIC SURGERY | Admitting: ORTHOPAEDIC SURGERY
Payer: COMMERCIAL

## 2021-07-15 DIAGNOSIS — M54.12 CERVICAL RADICULOPATHY: ICD-10-CM

## 2021-07-15 PROCEDURE — 72125 CT NECK SPINE W/O DYE: CPT

## 2021-08-17 DIAGNOSIS — M54.12 CERVICAL RADICULOPATHY: ICD-10-CM

## 2021-08-17 NOTE — TELEPHONE ENCOUNTER
RN send Tizanidine per Dr. Irizarry to patient's pharmacy. [facility]. RN called and verified meds with patient.     Star Lewis RN

## 2021-10-02 ENCOUNTER — HEALTH MAINTENANCE LETTER (OUTPATIENT)
Age: 56
End: 2021-10-02

## 2021-10-22 DIAGNOSIS — M54.12 CERVICAL RADICULOPATHY: Primary | ICD-10-CM

## 2021-11-02 ENCOUNTER — OFFICE VISIT (OUTPATIENT)
Dept: ORTHOPEDICS | Facility: CLINIC | Age: 56
End: 2021-11-02
Payer: COMMERCIAL

## 2021-11-02 ENCOUNTER — ANCILLARY PROCEDURE (OUTPATIENT)
Dept: GENERAL RADIOLOGY | Facility: CLINIC | Age: 56
End: 2021-11-02
Attending: ORTHOPAEDIC SURGERY
Payer: COMMERCIAL

## 2021-11-02 VITALS — WEIGHT: 170 LBS | HEIGHT: 70 IN | BODY MASS INDEX: 24.34 KG/M2

## 2021-11-02 DIAGNOSIS — M54.12 CERVICAL RADICULOPATHY: Primary | ICD-10-CM

## 2021-11-02 DIAGNOSIS — Z11.59 ENCOUNTER FOR SCREENING FOR OTHER VIRAL DISEASES: ICD-10-CM

## 2021-11-02 DIAGNOSIS — M54.2 CERVICAL PAIN: ICD-10-CM

## 2021-11-02 PROCEDURE — 72040 X-RAY EXAM NECK SPINE 2-3 VW: CPT | Mod: GC | Performed by: RADIOLOGY

## 2021-11-02 PROCEDURE — 99214 OFFICE O/P EST MOD 30 MIN: CPT | Mod: GC | Performed by: ORTHOPAEDIC SURGERY

## 2021-11-02 RX ORDER — GABAPENTIN 300 MG/1
300 CAPSULE ORAL 3 TIMES DAILY PRN
Qty: 90 CAPSULE | Refills: 0 | Status: SHIPPED | OUTPATIENT
Start: 2021-11-02 | End: 2022-01-05

## 2021-11-02 ASSESSMENT — MIFFLIN-ST. JEOR: SCORE: 1597.36

## 2021-11-02 NOTE — LETTER
"    11/2/2021         RE: Pawel Vallejo  [address] [address]        Dear Colleague,    Thank you for referring your patient, Pawel Vallejo, to the [facility] [place]. Please see a copy of my visit note below.    Spine Surgery Return Clinic Visit      Chief Complaint:   RECHECK (Neck pain, was closing down his pool and carrying something that was a little too heavy and he started to having right neck tingling and dull ache at the base of his neck, and goes down to his right clavicle. )      Interval HPI:  Symptom Profile Including: location of symptoms, onset, severity, exacerbating/alleviating factors, previous treatments:        Pawel Vallejo is a 55 year old male who underwent Cervical 4 to 6 Anterior Cervical Decompression and Fusion with Medtronic Plate and Screws, Interbody Device (DOS 12/31/20) for left C6 radicular complaints. He had been doing well.    1 month ago he noticed worsening pain of his neck which seem be positional in nature.  Pain is isolated to the right neck, radiates down the clavicle, and the posterior neck.he notices a burning with the neck posteriorly when leaning forward, no occasional numbness in the region of the clavicle.    Modalities for treatment of included oxycodone, Tylenol, ibuprofen, naproxen, hot and cold packs.  Also saw his primary care provider where work-up was done including EKG and labs which were all negative.  Of note has not done any physical therapy.    Of note, he did have Covid in May and was hospitalized for long period of time.  Reports that he was deconditioned after this.  He only recently came off the oxygen 1-1/2 months ago.  Since then he has been increasing his activity, doing work around the house, lifting heavy things to move including his swimming pool equipment.             Physical Exam:   Vitals: Ht 1.778 m (5' 10\")   Wt 77.1 kg (170 lb)   BMI 24.39 kg/m    Musculoskeletal, Neurologic, " and Spine:     Incision c/d/I, no drainage, erythema noted    Cervical spine:    Appearance -no gross step-offs, kyphosis.    Motor -     C5: Deltoids R 5/5 and L 5/5 strength    C6: Biceps R 5/5 and L 5/5 strength     C7: Triceps R 5/5 and L 5/5 strength     C8:  R 5/5 and L 5/5 strength         Sensation: intact to light touch in C5-T1    Negative Spurling's bilaterally.  Mild tenderness to palpation of the posterior neck within the paraspinal muscles.    No tenderness to palpation of the clavicle.  No pain with range of motion of the shoulder.  Negative Neer and Nobles.            Imaging:   AP lateral C-spine x-rays today demonstrate C4-6 ACDF which is well-appearing.  No evidence of hardware failure, screw back out. Mild bony lucency of C5-C6 which may represent delayed union at that level. C4-5 appear healed. No fractures nor dislocation.  Alignment is unchanged from prior imaging.  Overall well-appearing postoperative ACDF x-ray\.     Assessment and Plan:     55 year old male 10 mo s/p C4-6 ACDF on DOS 12/31/20 now w/ R neck pain.  DDx: muscle degeneration/atrophy requiring PT, vs C3-C4 degenerative disease, vs symptomatic C5-C6 nonunion (low liklihood).    Discussed conservative option of PT alone vs aggressive option of PT, medication, injection, and MRI. Patient's symptoms are very bothersome to him so he wishes to pursue the later.    1. PT  2. Continue tizanidine, naproxen, tylenol  3. Start 300mg gabapentin TID  4. MRI C-spine to assess C3-C4  5. C3-C4 diagnostic/therapeutic left SNRI   6. F/u via MyChart after injection.    Seen and d/w Dr. Juan C Mccall MD  Orthopaedic Surgery, PGY-4  Pager: [phone]    Attending MD (Dr. Pawel Irizarry) :  I reviewed and verified the history and physical exam of the patient and discussed the patient's management with the other clinical providers involved in this patient's care including any involved residents or physicians assistants. I  reviewed the above note and agree with the documented findings and plan of care, which were communicated to the patient.      Pawel Irizarry MD

## 2021-11-02 NOTE — PROGRESS NOTES
"Spine Surgery Return Clinic Visit      Chief Complaint:   RECHECK (Neck pain, was closing down his pool and carrying something that was a little too heavy and he started to having right neck tingling and dull ache at the base of his neck, and goes down to his right clavicle. )      Interval HPI:  Symptom Profile Including: location of symptoms, onset, severity, exacerbating/alleviating factors, previous treatments:        Pawel Vallejo is a 55 year old male who underwent Cervical 4 to 6 Anterior Cervical Decompression and Fusion with Medtronic Plate and Screws, Interbody Device (DOS 12/31/20) for left C6 radicular complaints. He had been doing well.    1 month ago he noticed worsening pain of his neck which seem be positional in nature.  Pain is isolated to the right neck, radiates down the clavicle, and the posterior neck.he notices a burning with the neck posteriorly when leaning forward, no occasional numbness in the region of the clavicle.    Modalities for treatment of included oxycodone, Tylenol, ibuprofen, naproxen, hot and cold packs.  Also saw his primary care provider where work-up was done including EKG and labs which were all negative.  Of note has not done any physical therapy.    Of note, he did have Covid in May and was hospitalized for long period of time.  Reports that he was deconditioned after this.  He only recently came off the oxygen 1-1/2 months ago.  Since then he has been increasing his activity, doing work around the house, lifting heavy things to move including his swimming pool equipment.             Physical Exam:   Vitals: Ht 1.778 m (5' 10\")   Wt 77.1 kg (170 lb)   BMI 24.39 kg/m    Musculoskeletal, Neurologic, and Spine:     Incision c/d/I, no drainage, erythema noted    Cervical spine:    Appearance -no gross step-offs, kyphosis.    Motor -     C5: Deltoids R 5/5 and L 5/5 strength    C6: Biceps R 5/5 and L 5/5 strength     C7: Triceps R 5/5 and L 5/5 strength     C8:  R " 5/5 and L 5/5 strength         Sensation: intact to light touch in C5-T1    Negative Spurling's bilaterally.  Mild tenderness to palpation of the posterior neck within the paraspinal muscles.    No tenderness to palpation of the clavicle.  No pain with range of motion of the shoulder.  Negative Neer and Nobles.            Imaging:   AP lateral C-spine x-rays today demonstrate C4-6 ACDF which is well-appearing.  No evidence of hardware failure, screw back out. Mild bony lucency of C5-C6 which may represent delayed union at that level. C4-5 appear healed. No fractures nor dislocation.  Alignment is unchanged from prior imaging.  Overall well-appearing postoperative ACDF x-ray\.     Assessment and Plan:     55 year old male 10 mo s/p C4-6 ACDF on DOS 12/31/20 now w/ R neck pain.  DDx: muscle degeneration/atrophy requiring PT, vs C3-C4 degenerative disease, vs symptomatic C5-C6 nonunion (low liklihood).    Discussed conservative option of PT alone vs aggressive option of PT, medication, injection, and MRI. Patient's symptoms are very bothersome to him so he wishes to pursue the later.    1. PT  2. Continue tizanidine, naproxen, tylenol  3. Start 300mg gabapentin TID  4. MRI C-spine to assess C3-C4  5. C3-C4 diagnostic/therapeutic left SNRI   6. F/u via MyChart after injection.    Seen and d/w Dr. Juan C Mccall MD  Orthopaedic Surgery, PGY-4  Pager: [phone]    Attending MD (Dr. Pawel Irizarry) :  I reviewed and verified the history and physical exam of the patient and discussed the patient's management with the other clinical providers involved in this patient's care including any involved residents or physicians assistants. I reviewed the above note and agree with the documented findings and plan of care, which were communicated to the patient.      Pawel Irizarry MD

## 2021-11-02 NOTE — NURSING NOTE
"Reason For Visit:   Chief Complaint   Patient presents with     RECHECK     Neck pain, was closing down his pool and carrying something that was a little too heavy and he started to having right neck tingling and dull ache at the base of his neck, and goes down to his right clavicle.        Primary MD: Melvi Hale  Ref. MD: Geoffrey    ?  No  Date of injury: about 1 month ago   Type of injury: chronic .  Date of surgery: Dr. Irizarry   Type of surgery: Dr. Irizarry .  Smoker: No  Request smoking cessation information: No    Ht 1.778 m (5' 10\")   Wt 77.1 kg (170 lb)   BMI 24.39 kg/m           Oswestry (CAROLINA) Questionnaire    No flowsheet data found.         Neck Disability Index (NDI) Questionnaire    Neck Disability Index (NDI) 2/9/2021   Neck Disability Index: Count 7   NDI: Total Score = SUM (points for all 10 findings) 3   Neck Disability in Percent = (Total Score) / 50 * 100 8.57 (%)                   Promis 10 Assessment    PROMIS 10 2/9/2021   In general, would you say your health is: Good   In general, would you say your quality of life is: Good   In general, how would you rate your physical health? Good   In general, how would you rate your mental health, including your mood and your ability to think? Very good   In general, how would you rate your satisfaction with your social activities and relationships? Very good   In general, please rate how well you carry out your usual social activities and roles Very good   To what extent are you able to carry out your everyday physical activities such as walking, climbing stairs, carrying groceries, or moving a chair? Mostly   How often have you been bothered by emotional problems such as feeling anxious, depressed or irritable? Never   How would you rate your fatigue on average? Mild   How would you rate your pain on average?   0 = No Pain  to  10 = Worst Imaginable Pain 1   In general, would you say your health is: 3   In general, would you say your quality " of life is: 3   In general, how would you rate your physical health? 3   In general, how would you rate your mental health, including your mood and your ability to think? 4   In general, how would you rate your satisfaction with your social activities and relationships? 4   In general, please rate how well you carry out your usual social activities and roles. (This includes activities at home, at work and in your community, and responsibilities as a parent, child, spouse, employee, friend, etc.) 4   To what extent are you able to carry out your everyday physical activities such as walking, climbing stairs, carrying groceries, or moving a chair? 4   In the past 7 days, how often have you been bothered by emotional problems such as feeling anxious, depressed, or irritable? 1   In the past 7 days, how would you rate your fatigue on average? 2   In the past 7 days, how would you rate your pain on average, where 0 means no pain, and 10 means worst imaginable pain? 1   Global Mental Health Score 16   Global Physical Health Score 15   PROMIS TOTAL - SUBSCORES 31   Some recent data might be hidden                Carlos Yoo, VAUGHN

## 2021-11-03 ENCOUNTER — TELEPHONE (OUTPATIENT)
Dept: MEDSURG UNIT | Facility: CLINIC | Age: 56
End: 2021-11-03
Payer: COMMERCIAL

## 2021-11-04 ENCOUNTER — ANCILLARY PROCEDURE (OUTPATIENT)
Dept: MRI IMAGING | Facility: CLINIC | Age: 56
End: 2021-11-04
Attending: ORTHOPAEDIC SURGERY
Payer: COMMERCIAL

## 2021-11-04 DIAGNOSIS — M54.2 CERVICAL PAIN: ICD-10-CM

## 2021-11-04 DIAGNOSIS — M54.12 CERVICAL RADICULOPATHY: ICD-10-CM

## 2021-11-04 PROCEDURE — 72141 MRI NECK SPINE W/O DYE: CPT | Performed by: RADIOLOGY

## 2021-11-05 ENCOUNTER — LAB (OUTPATIENT)
Dept: URGENT CARE | Facility: URGENT CARE | Age: 56
End: 2021-11-05
Payer: COMMERCIAL

## 2021-11-05 DIAGNOSIS — Z11.59 ENCOUNTER FOR SCREENING FOR OTHER VIRAL DISEASES: ICD-10-CM

## 2021-11-05 PROCEDURE — U0005 INFEC AGEN DETEC AMPLI PROBE: HCPCS

## 2021-11-05 PROCEDURE — U0003 INFECTIOUS AGENT DETECTION BY NUCLEIC ACID (DNA OR RNA); SEVERE ACUTE RESPIRATORY SYNDROME CORONAVIRUS 2 (SARS-COV-2) (CORONAVIRUS DISEASE [COVID-19]), AMPLIFIED PROBE TECHNIQUE, MAKING USE OF HIGH THROUGHPUT TECHNOLOGIES AS DESCRIBED BY CMS-2020-01-R: HCPCS

## 2021-11-06 LAB — SARS-COV-2 RNA RESP QL NAA+PROBE: NEGATIVE

## 2021-11-08 ENCOUNTER — HOSPITAL ENCOUNTER (OUTPATIENT)
Dept: GENERAL RADIOLOGY | Facility: CLINIC | Age: 56
End: 2021-11-08
Attending: ORTHOPAEDIC SURGERY
Payer: COMMERCIAL

## 2021-11-08 ENCOUNTER — HOSPITAL ENCOUNTER (OUTPATIENT)
Facility: CLINIC | Age: 56
Discharge: HOME | End: 2021-11-08
Admitting: PHYSICIAN ASSISTANT
Payer: COMMERCIAL

## 2021-11-08 ENCOUNTER — TELEPHONE (OUTPATIENT)
Dept: MEDSURG UNIT | Facility: CLINIC | Age: 56
End: 2021-11-08
Payer: COMMERCIAL

## 2021-11-08 VITALS
OXYGEN SATURATION: 94 % | DIASTOLIC BLOOD PRESSURE: 69 MMHG | SYSTOLIC BLOOD PRESSURE: 124 MMHG | HEART RATE: 68 BPM | RESPIRATION RATE: 16 BRPM | TEMPERATURE: 96.9 F

## 2021-11-08 DIAGNOSIS — M54.2 CERVICAL PAIN: ICD-10-CM

## 2021-11-08 DIAGNOSIS — M54.12 CERVICAL RADICULOPATHY: ICD-10-CM

## 2021-11-08 PROCEDURE — 250N000011 HC RX IP 250 OP 636: Performed by: ORTHOPAEDIC SURGERY

## 2021-11-08 PROCEDURE — 999N000154 HC STATISTIC RADIOLOGY XRAY, US, CT, MAR, NM

## 2021-11-08 PROCEDURE — 64479 NJX AA&/STRD TFRM EPI C/T 1: CPT

## 2021-11-08 PROCEDURE — 250N000009 HC RX 250: Performed by: PHYSICIAN ASSISTANT

## 2021-11-08 PROCEDURE — 255N000002 HC RX 255 OP 636: Performed by: ORTHOPAEDIC SURGERY

## 2021-11-08 PROCEDURE — 250N000011 HC RX IP 250 OP 636: Performed by: PHYSICIAN ASSISTANT

## 2021-11-08 RX ORDER — LIDOCAINE HYDROCHLORIDE 10 MG/ML
30 INJECTION, SOLUTION EPIDURAL; INFILTRATION; INTRACAUDAL; PERINEURAL ONCE
Status: DISCONTINUED | OUTPATIENT
Start: 2021-11-08 | End: 2021-11-08

## 2021-11-08 RX ORDER — NALOXONE HYDROCHLORIDE 0.4 MG/ML
0.2 INJECTION, SOLUTION INTRAMUSCULAR; INTRAVENOUS; SUBCUTANEOUS
Status: DISCONTINUED | OUTPATIENT
Start: 2021-11-08 | End: 2021-11-09 | Stop reason: HOSPADM

## 2021-11-08 RX ORDER — IOPAMIDOL 408 MG/ML
10 INJECTION, SOLUTION INTRATHECAL ONCE
Status: COMPLETED | OUTPATIENT
Start: 2021-11-08 | End: 2021-11-08

## 2021-11-08 RX ORDER — FENTANYL CITRATE 50 UG/ML
25-50 INJECTION, SOLUTION INTRAMUSCULAR; INTRAVENOUS EVERY 5 MIN PRN
Status: DISCONTINUED | OUTPATIENT
Start: 2021-11-08 | End: 2021-11-09 | Stop reason: HOSPADM

## 2021-11-08 RX ORDER — LIDOCAINE HYDROCHLORIDE 10 MG/ML
30 INJECTION, SOLUTION EPIDURAL; INFILTRATION; INTRACAUDAL; PERINEURAL ONCE
Status: DISCONTINUED | OUTPATIENT
Start: 2021-11-08 | End: 2021-11-09 | Stop reason: HOSPADM

## 2021-11-08 RX ORDER — FLUMAZENIL 0.1 MG/ML
0.2 INJECTION, SOLUTION INTRAVENOUS
Status: DISCONTINUED | OUTPATIENT
Start: 2021-11-08 | End: 2021-11-09 | Stop reason: HOSPADM

## 2021-11-08 RX ORDER — NALOXONE HYDROCHLORIDE 0.4 MG/ML
0.4 INJECTION, SOLUTION INTRAMUSCULAR; INTRAVENOUS; SUBCUTANEOUS
Status: DISCONTINUED | OUTPATIENT
Start: 2021-11-08 | End: 2021-11-09 | Stop reason: HOSPADM

## 2021-11-08 RX ORDER — DEXAMETHASONE SODIUM PHOSPHATE 10 MG/ML
10 INJECTION, SOLUTION INTRAMUSCULAR; INTRAVENOUS ONCE
Status: COMPLETED | OUTPATIENT
Start: 2021-11-08 | End: 2021-11-08

## 2021-11-08 RX ORDER — NAPROXEN 500 MG/1
500 TABLET ORAL
Status: ON HOLD | COMMUNITY
End: 2024-05-11

## 2021-11-08 RX ADMIN — FENTANYL CITRATE 50 MCG: 50 INJECTION, SOLUTION INTRAMUSCULAR; INTRAVENOUS at 15:10

## 2021-11-08 RX ADMIN — IOPAMIDOL 0.5 ML: 408 INJECTION, SOLUTION INTRATHECAL at 15:24

## 2021-11-08 RX ADMIN — DEXAMETHASONE SODIUM PHOSPHATE 10 MG: 10 INJECTION, SOLUTION INTRAMUSCULAR; INTRAVENOUS at 15:25

## 2021-11-08 RX ADMIN — MIDAZOLAM HYDROCHLORIDE 0.5 MG: 1 INJECTION, SOLUTION INTRAMUSCULAR; INTRAVENOUS at 15:16

## 2021-11-08 RX ADMIN — MIDAZOLAM HYDROCHLORIDE 1 MG: 1 INJECTION, SOLUTION INTRAMUSCULAR; INTRAVENOUS at 15:10

## 2021-11-08 RX ADMIN — MIDAZOLAM HYDROCHLORIDE 0.5 MG: 1 INJECTION, SOLUTION INTRAMUSCULAR; INTRAVENOUS at 15:14

## 2021-11-08 RX ADMIN — FENTANYL CITRATE 50 MCG: 50 INJECTION, SOLUTION INTRAMUSCULAR; INTRAVENOUS at 15:16

## 2021-11-08 RX ADMIN — LIDOCAINE HYDROCHLORIDE 4 ML: 10 INJECTION, SOLUTION EPIDURAL; INFILTRATION; INTRACAUDAL; PERINEURAL at 15:25

## 2021-11-08 NOTE — TELEPHONE ENCOUNTER
Pre-Procedure Negative COVID Test Results    Results Reviewed  The patient has a negative COVID test result within the required timeframe for the scheduled procedure.     No COVID pre-call needed.     Richa Monterroso RN

## 2021-11-08 NOTE — PRE-PROCEDURE
GENERAL PRE-PROCEDURE:   Procedure:  Right C3-C4 nerve root block    Written consent obtained?: Yes    Risks and benefits: Risks, benefits and alternatives were discussed    Consent given by:  Patient  Patient states understanding of procedure being performed: Yes    Patient's understanding of procedure matches consent: Yes    Procedure consent matches procedure scheduled: Yes    Expected level of sedation:  Moderate  Appropriately NPO:  Yes  Mallampati  :  Grade 1- soft palate, uvula, tonsillar pillars, and posterior pharyngeal wall visible  Lungs:  Lungs clear with good breath sounds bilaterally  Heart:  Normal heart sounds and rate  History & Physical reviewed:  History and physical reviewed and no updates needed  Statement of review:  I have reviewed the lab findings, diagnostic data, medications, and the plan for sedation

## 2021-11-08 NOTE — PROCEDURES
[facility]    Procedure: Right C3-C4 (C4) nerve root block    Date/Time: 11/8/2021 3:29 PM  Performed by: Og Oshea PA-C  Authorized by: Og Oshea PA-C     UNIVERSAL PROTOCOL   Site Marked: Yes  Prior Images Obtained and Reviewed:  Yes  Required items: Required blood products, implants, devices and special equipment available    Patient identity confirmed:  Verbally with patient  Patient was reevaluated immediately before administering moderate or deep sedation or anesthesia  Confirmation Checklist:  Patient's identity using two indicators, relevant allergies, procedure was appropriate and matched the consent or emergent situation and correct equipment/implants were available  Time out: Immediately prior to the procedure a time out was called    Universal Protocol: the Joint Commission Universal Protocol was followed    Preparation: Patient was prepped and draped in usual sterile fashion           ANESTHESIA    Local Anesthetic: Lidocaine 1% without epinephrine      SEDATION    Patient Sedated: Yes    Sedation:  Fentanyl and midazolam  Vital signs: Vital signs monitored during sedation    See dictated procedure note for full details.  PROCEDURE   Patient Tolerance:  Patient tolerated the procedure well with no immediate complications  Describe Procedure: No complications.  No EBL.  Length of time physician/provider present for 1:1 monitoring during sedation: 15

## 2021-11-08 NOTE — PROGRESS NOTES
[facility] Procedure Nursing Note    Patient Information  Name: Pawel Vallejo  Age: 56 year old    Procedure  Procedure: Nerve Root Block with sedation  Procedure start time: 15:10  Procedure complete time: 15:30  Concerns/abnormal assessment: None  Pt tolerated well. VSS. Total sedation given 100 mcg Fentanyl and 2 mg Versed.   Pt back to [facility].   Continue to monitor patient.  Discharge to home when patient meets criteria one hour post sedation.        [facility] Discharge Nursing Note    Discharge Education:  Discharge instructions reviewed: Yes  Patient/patient representative verbalizes understanding: Yes  Patient discharging on new medications: No  Medication education completed: Yes    Discharge Plans:   Discharge location: home  Discharge ride contacted: Yes  Approximate discharge time: 16:27    Discharge Criteria:  Discharge criteria met and vital signs stable: Yes    Patient Belongs:  Patient belongings returned to patient: Yes

## 2021-11-08 NOTE — PROGRESS NOTES
PATIENT/VISITOR WELLNESS SCREENING    Step 1 Patient Screening    1. In the last month, have you been in contact with someone who was confirmed or suspected to have Coronavirus/COVID-19? No    2. Do you have the following symptoms?  Fever/Chills? No   Cough? No   Shortness of breath? No   New loss of taste or smell? No  Sore throat? No  Muscle or body aches? No  Headaches? No  Fatigue? No  Vomiting or diarrhea? No    Step 2 Visitor Screening    1. Name of Visitor (1 visitor per patient): spouse    2. In the last month, have you been in contact with someone who was confirmed or suspected to have Coronavirus/COVID-19? No    3. Do you have the following symptoms?  Fever/Chills? No   Cough? No   Shortness of breath? No   Skin rash? No   Loss of taste or smell? No  Sore throat? No  Runny or stuffy nose? No  Muscle or body aches? No  Headaches? No  Fatigue? No  Vomiting or diarrhea? No    If the visitor has positive symptoms, notify supervisor/manger  Per policy, the visitor will need to leave the facility     Step 3 Refer to logic grid below for actions    NO SYMPTOM(S)    ACTIONS:  1. Standard rooming process  2. Provider to assess per normal protocol  3. Implement precautions as needed and per guidelines     POSITIVE SYMPTOM(S)  If positive for ANY of the following symptoms: fever, cough, shortness of breath, rash    ACTION:  1. Continue to have the patient wear a mask   2. Room patient as soon as possible  3. Don appropriate PPE when entering room  4. Provider evaluation

## 2021-11-08 NOTE — DISCHARGE INSTRUCTIONS
Sedation Discharge Instructions     After you go home:      You may resume your normal diet    Have an adult stay with you for 6 hours if you received sedation       For 24 hours - due to the sedation you received:    Relax and take it easy    Do NOT make any important or legal decisions    Do NOT drive or operate machines at home or at work    Do NOT drink alcohol    Activity       You may go back to normal activity in 24 hours    Medicines:      You may resume all medications                   If you have questions call:          [facility] Radiology Dept @ [phone]      Nerve Root Injection Discharge Instructions     After you go home:      You may resume your normal diet.    Care of Puncture Site:      If you have a bandaid on your puncture site, you may remove it the next morning    You may shower tomorrow    No bath tubs, whirlpools or swimming pool for at least 48 hours    Use ice packs as needed for discomfort     Activity:      Minimize your activity today. You may gradually resume your normal activity as tolerated    Avoid vigorous or strenuous activity until your symptoms improve or as directed by your doctor    Do NOT drive a vehicle for a few hours after the injection - or longer if you develop numbness in your arm or leg    Medicines:      You may resume all medications, including blood thinners    Resume your Platelet Inhibitors and Aspirin tomorrow at your regular dose    For minor discomfort, you may take Acetaminophen (Tylenol) or Ibuprofen (Advil)    Pain:       You may experience increased or different pain over the next 24-48 hours    For the next 48 hrs - you may use ice packs for discomfort     Call your primary care doctor if:      You have severe pain that does not improve with pain medication    You have chills or a fever greater than 101 F (38 C)    The site is red, swollen, hot or tender    Increase in pain, weakness or numbness    New problems with your bowel or  bladder    Any questions or concerns    What to watch for:      It can be normal to have some bruising or slight swelling at the puncture site.     After the procedure, you may have some new weakness or numbness down your arm/leg from the numbing medicine. This should resolve in a few hours.     You may feel some temporary relief from the numbing medicine, but that will wear off within a few hours.  Your symptoms may return to pre procedure level, or can even be worse for the first 1-2 days.    For many people, the steroid begins to provide some relief within 2-3 days, but it can take up to 2 weeks to obtain the full results.    Some people will get lasting relief from a single injection. Others may require up to 3 injections to get results. If you have more than one steroid injection, they should be given 2 weeks apart.    If you have no improvement in your symptoms after two weeks, please contact the doctor who ordered this procedure to discuss the next steps.    Side effects of your steroid injection are mild and will go away in 2-3 days  - Insomnia  - Irritability  - Flushed face  - Water retention  - Restlessness  - Difficulty sleeping  - Increased appetite  - Increased blood sugar    If you have questions or concerns call:                  [facility] Radiology Dept @ [phone]                                    between 8am-4:30pm Mon-Fri    If you have urgent questions outside of these normal business hours, please contact the [facility] on call doctor @ [phone]      The provider who performed your procedure was _________________.

## 2022-01-05 DIAGNOSIS — M54.12 CERVICAL RADICULOPATHY: ICD-10-CM

## 2022-01-05 DIAGNOSIS — M54.2 CERVICAL PAIN: ICD-10-CM

## 2022-01-05 RX ORDER — GABAPENTIN 300 MG/1
300 CAPSULE ORAL 3 TIMES DAILY PRN
Qty: 90 CAPSULE | Refills: 0 | Status: ON HOLD | OUTPATIENT
Start: 2022-01-05 | End: 2024-05-11

## 2022-01-22 ENCOUNTER — HEALTH MAINTENANCE LETTER (OUTPATIENT)
Age: 57
End: 2022-01-22

## 2022-09-03 ENCOUNTER — HEALTH MAINTENANCE LETTER (OUTPATIENT)
Age: 57
End: 2022-09-03

## 2023-04-29 ENCOUNTER — HEALTH MAINTENANCE LETTER (OUTPATIENT)
Age: 58
End: 2023-04-29

## 2023-05-31 DIAGNOSIS — M54.2 CERVICAL PAIN: Primary | ICD-10-CM

## 2023-06-06 ENCOUNTER — ANCILLARY PROCEDURE (OUTPATIENT)
Dept: GENERAL RADIOLOGY | Facility: CLINIC | Age: 58
End: 2023-06-06
Attending: ORTHOPAEDIC SURGERY
Payer: COMMERCIAL

## 2023-06-06 ENCOUNTER — OFFICE VISIT (OUTPATIENT)
Dept: ORTHOPEDICS | Facility: CLINIC | Age: 58
End: 2023-06-06
Payer: COMMERCIAL

## 2023-06-06 DIAGNOSIS — M54.12 CERVICAL RADICULOPATHY: Primary | ICD-10-CM

## 2023-06-06 DIAGNOSIS — M54.2 CERVICAL PAIN: ICD-10-CM

## 2023-06-06 PROCEDURE — 99214 OFFICE O/P EST MOD 30 MIN: CPT | Mod: GC | Performed by: ORTHOPAEDIC SURGERY

## 2023-06-06 PROCEDURE — 72050 X-RAY EXAM NECK SPINE 4/5VWS: CPT | Performed by: RADIOLOGY

## 2023-06-06 NOTE — PROGRESS NOTES
Spine Surgery Return Clinic Visit      Chief Complaint:   RECHECK (Patient returns to clinic for recheck cervical spine.  Last seen 11/2/21.  Currently c/o left hand numbness/tingling, lower cervical spine pain, and positional vertigo.  )      Interval HPI:  Symptom Profile Including: location of symptoms, onset, severity, exacerbating/alleviating factors, previous treatments:        Pawel Vallejo is a 57 year old male who is presenting to clinic for follow-up regarding his cervical spine.  Patient underwent a C4-6 ACDF and December 2020.  Patient reports that he has been experiencing complaints of buzzing sensation in the index and thumb on the left upper extremity, point tenderness along the lower cervical spine and paraspinal musculature along with some vertigo symptoms over the last several weeks.  Patient denies any recent falls or injuries.  Patient is currently undergoing further work-up with his primary care provider but wanted to seek consultation with his spine surgeon given his past surgical spine history.  Patient reports that the vertigo is triggered with certain range of motion maneuvers of the neck particularly when he is looking to the right.  Patient states that he feels unbalanced and has to catch himself and then eventually it resolves.  Patient denies any complaints of numbness in the upper extremities.  The only complaint is the burning/tingling sensation that he notes that goes into the thumb and index of the left upper extremity.  Patient is currently not taking any new medications.  He has not been involved in any physical therapy.            Past Medical History:     Past Medical History:   Diagnosis Date     BPH (benign prostatic hyperplasia)      Chronic back pain      Gastroesophageal reflux disease with esophagitis      History of hypertension      HLD (hyperlipidemia)      Pleurisy             Past Surgical History:     Past Surgical History:   Procedure Laterality Date      CHOLECYSTECTOMY       DECOMPRESSION, FUSION CERVICAL ANTERIOR TWO LEVELS, COMBINED Left 2020    Procedure: Cervical 4 to 6 Anterior Cervical Decompression and Fusion with Medtronic Plate and Screws, Interbody Device, use of Fluoroscopy, Microscope;  Surgeon: Pawel Irizarry MD;  Location: [facility] OR     GRAFT BONE FROM ILIAC CREST Left 2020    Procedure: and Left Sided Iliac Crest Autograft;  Surgeon: Pawel Irizarry MD;  Location: [facility] OR     HERNIA REPAIR      Diastasis recti/ abdominal     LEFT shoulder arthroscopic rotator cuff repair, subacromial decompression and bicep tenodesis              Social History:     Social History     Tobacco Use     Smoking status: Former     Packs/day: 0.25     Years: 30.00     Pack years: 7.50     Types: Cigarettes     Quit date: 10/2020     Years since quittin.6     Smokeless tobacco: Never     Tobacco comments:     quit in . restarted in the past couple of years and then smoked more socially.   Vaping Use     Vaping status: Not on file   Substance Use Topics     Alcohol use: Yes     Comment: socially            Family History:   No family history on file.         Allergies:   No Known Allergies         Medications:     Current Outpatient Medications   Medication     acetaminophen (TYLENOL) 325 MG tablet     albuterol (PROAIR HFA/PROVENTIL HFA/VENTOLIN HFA) 108 (90 Base) MCG/ACT inhaler     Ascorbic Acid (VITAMIN C PO)     Cholecalciferol (VITAMIN D3 PO)     clobetasol (TEMOVATE) 0.05 % external ointment     KRILL OIL PO     Multiple Vitamins-Minerals (ZINC PO)     naproxen (NAPROSYN) 500 MG tablet     tamsulosin (FLOMAX) 0.4 MG capsule     VITAMIN A PO     benzonatate (TESSALON) 100 MG capsule     benzonatate (TESSALON) 100 MG capsule     gabapentin (NEURONTIN) 300 MG capsule     oxyCODONE (ROXICODONE) 5 MG tablet     pantoprazole (PROTONIX) 40 MG EC tablet     tiZANidine (ZANAFLEX) 4 MG tablet     No current facility-administered  medications for this visit.             Review of Systems:   A focused musculoskeletal and neurologic ROS was performed with pertinent positives and negatives noted in the HPI.  Additional systems were also reviewed and are documented at the bottom of the note.         Physical Exam:   Vitals: There were no vitals taken for this visit.  Musculoskeletal, Neurologic, and Spine:   Cervical spine:    Appearance -no gross step-offs, kyphosis.    Motor -     C5: Deltoids R 5/5 and L 5/5 strength    C6: Biceps R 5/5 and L 5/5 strength     C7: Triceps R 5/5 and L 5/5 strength     C8:  R 5/5 and L 5/5 strength     T1: Dorsal interossei R 5/5 and L 5/5 strength      Sensation: intact to light touch in C5-T1    Special Tests -      Lhermitte's Test - Negative     Spurling's Test - Negative                    Imaging:   We ordered and independently reviewed new radiographs at this clinic visit. The results were discussed with the patient. Findings include: X-rays of the cervical spine reviewed.  Implants are in appropriate position without any evidence of compromise or fracture.  There does look like there has been some progression of arthritis at the C6-7 vertebrae segment.  No malalignment or fractures noted.  X-rays were compared to the x-rays taken in November 2021.  They are comparable alignment and implant position. MRI from 2021 also reviewed.       Assessment and Plan:     57 year old male who is presenting to clinic approximately 2 years and 3 months status post a C4-C6 ACDF of his cervical spine.  Overall patient reports he has been doing well and has postsurgical recovery.  Patient has had recent onset of radicular type symptoms in the left upper extremity along with reports of vertigo type sensation with maneuvers of the neck.  X-rays were reviewed and there is evidence of some of arthritis at the adjacent C6-7 vertebral segment.  The alignment of the cervical spine appears similar to imaging that was obtained  in November 2021.  Nature of patient's symptoms discussed in depth and patient at this time expresses that his symptoms are not debilitating or significantly impairing patient's activities of daily living.  The more pressing concern for the patient has continued work-up of vertigo that he has been experiencing and is ongoing with his primary care provider.  Options as far as the tingling and the thumb and index finger include observation versus further work-up I did not find a potential etiology.  At this time patient would like to continue with observation course of care.  We feel this is a acceptable decision and we will proceed forward.  The patient reports that symptoms are beginning worse or persistent at that time the neck steps would be to obtain MRI/EMG to help determine potential source of this radiculopathy type sensation.  Patient also was presented with options of physical therapy versus injections into the neck and at this time he would like to hold off on that.  Patient will follow-up on an as-needed basis at this time.     Thank you,    Dalton Flores MD   PGY1  Department of Orthopaedic Surgery  Pager [phone]    Attending MD (Dr. Pawel Irizarry) :  I reviewed and verified the history and physical exam of the patient and discussed the patient's management with the other clinical providers involved in this patient's care including any involved residents or physicians assistants. I reviewed the above note and agree with the documented findings and plan of care, which were communicated to the patient.      Pawel Irizarry MD    Respectfully,  Pawel Irizarry MD  Spine Surgery  [facility]

## 2023-06-06 NOTE — NURSING NOTE
Reason For Visit:   Chief Complaint   Patient presents with     RECHECK     Patient returns to clinic for recheck cervical spine.  Last seen 11/2/21.  Currently c/o left hand numbness/tingling, lower cervical spine pain, and positional vertigo.         Primary MD: Melvi Hale  Ref. MD: jagjit    ?  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  Work status?  Full time.  Date of injury: NA  Type of injury: Chronic.  Date of surgery: 12/31/20  Type of surgery: C4-6 ACDF.      There were no vitals taken for this visit.    Pain Assessment  Patient Currently in Pain: Yes  0-10 Pain Scale: 4  Primary Pain Location: Neck    Oswestry (CAROLINA) Questionnaire        5/30/2023     9:27 AM   OSWESTRY DISABILITY INDEX   Count 10   Sum 3   Oswestry Score (%) 6 %            Neck Disability Index (NDI) Questionnaire        5/30/2023     9:41 AM   Neck Disability Index (NDI)   Neck Disability Index: Count 10   NDI: Total Score = SUM (points for all 10 findings) 5   Neck Disability in Percent = (Total Score) / 50 * 100 10 (%)                   Promis 10 Assessment        5/30/2023     9:30 AM   PROMIS 10   In general, would you say your health is: Good   In general, would you say your quality of life is: Good   In general, how would you rate your physical health? Fair   In general, how would you rate your mental health, including your mood and your ability to think? Very good   In general, how would you rate your satisfaction with your social activities and relationships? Very good   In general, please rate how well you carry out your usual social activities and roles Good   To what extent are you able to carry out your everyday physical activities such as walking, climbing stairs, carrying groceries, or moving a chair? Completely   In the past 7 days, how often have you been bothered by emotional problems such as feeling anxious, depressed, or irritable? Rarely   In the past 7 days, how would you rate your  fatigue on average? Moderate   In the past 7 days, how would you rate your pain on average, where 0 means no pain, and 10 means worst imaginable pain? 3   In general, would you say your health is: 3   In general, would you say your quality of life is: 3   In general, how would you rate your physical health? 2   In general, how would you rate your mental health, including your mood and your ability to think? 4   In general, how would you rate your satisfaction with your social activities and relationships? 4   In general, please rate how well you carry out your usual social activities and roles. (This includes activities at home, at work and in your community, and responsibilities as a parent, child, spouse, employee, friend, etc.) 3   To what extent are you able to carry out your everyday physical activities such as walking, climbing stairs, carrying groceries, or moving a chair? 5   In the past 7 days, how often have you been bothered by emotional problems such as feeling anxious, depressed, or irritable? 2   In the past 7 days, how would you rate your fatigue on average? 3   In the past 7 days, how would you rate your pain on average, where 0 means no pain, and 10 means worst imaginable pain? 3   Global Mental Health Score 15   Global Physical Health Score 14   PROMIS TOTAL - SUBSCORES 29                Bernardo Mejia, ATC

## 2023-06-06 NOTE — LETTER
6/6/2023         RE: Pawel Vallejo  [address] [address]        Dear Colleague,    Thank you for referring your patient, Pawel Vallejo, to the [facility] [place]. Please see a copy of my visit note below.    Spine Surgery Return Clinic Visit      Chief Complaint:   RECHECK (Patient returns to clinic for recheck cervical spine.  Last seen 11/2/21.  Currently c/o left hand numbness/tingling, lower cervical spine pain, and positional vertigo.  )      Interval HPI:  Symptom Profile Including: location of symptoms, onset, severity, exacerbating/alleviating factors, previous treatments:        Pawel Vallejo is a 57 year old male who is presenting to clinic for follow-up regarding his cervical spine.  Patient underwent a C4-6 ACDF and December 2020.  Patient reports that he has been experiencing complaints of buzzing sensation in the index and thumb on the left upper extremity, point tenderness along the lower cervical spine and paraspinal musculature along with some vertigo symptoms over the last several weeks.  Patient denies any recent falls or injuries.  Patient is currently undergoing further work-up with his primary care provider but wanted to seek consultation with his spine surgeon given his past surgical spine history.  Patient reports that the vertigo is triggered with certain range of motion maneuvers of the neck particularly when he is looking to the right.  Patient states that he feels unbalanced and has to catch himself and then eventually it resolves.  Patient denies any complaints of numbness in the upper extremities.  The only complaint is the burning/tingling sensation that he notes that goes into the thumb and index of the left upper extremity.  Patient is currently not taking any new medications.  He has not been involved in any physical therapy.            Past Medical History:     Past Medical History:   Diagnosis Date    BPH (benign  prostatic hyperplasia)     Chronic back pain     Gastroesophageal reflux disease with esophagitis     History of hypertension     HLD (hyperlipidemia)     Pleurisy             Past Surgical History:     Past Surgical History:   Procedure Laterality Date    CHOLECYSTECTOMY      DECOMPRESSION, FUSION CERVICAL ANTERIOR TWO LEVELS, COMBINED Left 2020    Procedure: Cervical 4 to 6 Anterior Cervical Decompression and Fusion with Medtronic Plate and Screws, Interbody Device, use of Fluoroscopy, Microscope;  Surgeon: Pawel Irizarry MD;  [facility]: [facility]    GRAFT BONE FROM ILIAC CREST Left 2020    Procedure: and Left Sided Iliac Crest Autograft;  Surgeon: Pawel Irizarry MD;  Location: [facility] OR    HERNIA REPAIR      Diastasis recti/ abdominal    LEFT shoulder arthroscopic rotator cuff repair, subacromial decompression and bicep tenodesis              Social History:     Social History     Tobacco Use    Smoking status: Former     Packs/day: 0.25     Years: 30.00     Pack years: 7.50     Types: Cigarettes     Quit date: 10/2020     Years since quittin.6    Smokeless tobacco: Never    Tobacco comments:     quit in . restarted in the past couple of years and then smoked more socially.   Vaping Use    Vaping status: Not on file   Substance Use Topics    Alcohol use: Yes     Comment: socially            Family History:   No family history on file.         Allergies:   No Known Allergies         Medications:     Current Outpatient Medications   Medication    acetaminophen (TYLENOL) 325 MG tablet    albuterol (PROAIR HFA/PROVENTIL HFA/VENTOLIN HFA) 108 (90 Base) MCG/ACT inhaler    Ascorbic Acid (VITAMIN C PO)    Cholecalciferol (VITAMIN D3 PO)    clobetasol (TEMOVATE) 0.05 % external ointment    KRILL OIL PO    Multiple Vitamins-Minerals (ZINC PO)    naproxen (NAPROSYN) 500 MG tablet    tamsulosin (FLOMAX) 0.4 MG capsule    VITAMIN A PO    benzonatate (TESSALON) 100 MG capsule     benzonatate (TESSALON) 100 MG capsule    gabapentin (NEURONTIN) 300 MG capsule    oxyCODONE (ROXICODONE) 5 MG tablet    pantoprazole (PROTONIX) 40 MG EC tablet    tiZANidine (ZANAFLEX) 4 MG tablet     No current facility-administered medications for this visit.             Review of Systems:   A focused musculoskeletal and neurologic ROS was performed with pertinent positives and negatives noted in the HPI.  Additional systems were also reviewed and are documented at the bottom of the note.         Physical Exam:   Vitals: There were no vitals taken for this visit.  Musculoskeletal, Neurologic, and Spine:   Cervical spine:    Appearance -no gross step-offs, kyphosis.    Motor -     C5: Deltoids R 5/5 and L 5/5 strength    C6: Biceps R 5/5 and L 5/5 strength     C7: Triceps R 5/5 and L 5/5 strength     C8:  R 5/5 and L 5/5 strength     T1: Dorsal interossei R 5/5 and L 5/5 strength      Sensation: intact to light touch in C5-T1    Special Tests -      Lhermitte's Test - Negative     Spurling's Test - Negative                    Imaging:   We ordered and independently reviewed new radiographs at this clinic visit. The results were discussed with the patient. Findings include: X-rays of the cervical spine reviewed.  Implants are in appropriate position without any evidence of compromise or fracture.  There does look like there has been some progression of arthritis at the C6-7 vertebrae segment.  No malalignment or fractures noted.  X-rays were compared to the x-rays taken in November 2021.  They are comparable alignment and implant position. MRI from 2021 also reviewed.       Assessment and Plan:     57 year old male who is presenting to clinic approximately 2 years and 3 months status post a C4-C6 ACDF of his cervical spine.  Overall patient reports he has been doing well and has postsurgical recovery.  Patient has had recent onset of radicular type symptoms in the left upper extremity along with reports of  vertigo type sensation with maneuvers of the neck.  X-rays were reviewed and there is evidence of some of arthritis at the adjacent C6-7 vertebral segment.  The alignment of the cervical spine appears similar to imaging that was obtained in November 2021.  Nature of patient's symptoms discussed in depth and patient at this time expresses that his symptoms are not debilitating or significantly impairing patient's activities of daily living.  The more pressing concern for the patient has continued work-up of vertigo that he has been experiencing and is ongoing with his primary care provider.  Options as far as the tingling and the thumb and index finger include observation versus further work-up I did not find a potential etiology.  At this time patient would like to continue with observation course of care.  We feel this is a acceptable decision and we will proceed forward.  The patient reports that symptoms are beginning worse or persistent at that time the neck steps would be to obtain MRI/EMG to help determine potential source of this radiculopathy type sensation.  Patient also was presented with options of physical therapy versus injections into the neck and at this time he would like to hold off on that.  Patient will follow-up on an as-needed basis at this time.     Thank you,    Dalton Flores MD   PGY1  Department of Orthopaedic Surgery  Pager [phone]    Attending MD (Dr. Pawel Irizarry) :  I reviewed and verified the history and physical exam of the patient and discussed the patient's management with the other clinical providers involved in this patient's care including any involved residents or physicians assistants. I reviewed the above note and agree with the documented findings and plan of care, which were communicated to the patient.      Pawel Irizarry MD    Respectfully,  Pawel Irizarry MD  Spine Surgery  [facility]

## 2024-05-11 ENCOUNTER — APPOINTMENT (OUTPATIENT)
Dept: CT IMAGING | Facility: CLINIC | Age: 59
DRG: 392 | End: 2024-05-11
Attending: EMERGENCY MEDICINE
Payer: COMMERCIAL

## 2024-05-11 ENCOUNTER — HOSPITAL ENCOUNTER (INPATIENT)
Facility: CLINIC | Age: 59
LOS: 7 days | Discharge: HOME | DRG: 392 | End: 2024-05-18
Attending: EMERGENCY MEDICINE | Admitting: INTERNAL MEDICINE
Payer: COMMERCIAL

## 2024-05-11 DIAGNOSIS — J45.20 MILD INTERMITTENT ASTHMA WITHOUT COMPLICATION: Primary | ICD-10-CM

## 2024-05-11 DIAGNOSIS — K57.32 DIVERTICULITIS OF COLON: ICD-10-CM

## 2024-05-11 DIAGNOSIS — R20.0 BILATERAL FINGER NUMBNESS: ICD-10-CM

## 2024-05-11 DIAGNOSIS — K57.92 DIVERTICULITIS: ICD-10-CM

## 2024-05-11 LAB
ABO/RH(D): NORMAL
ALBUMIN SERPL BCG-MCNC: 4.4 G/DL (ref 3.5–5.2)
ALBUMIN UR-MCNC: NEGATIVE MG/DL
ALP SERPL-CCNC: 107 U/L (ref 40–150)
ALT SERPL W P-5'-P-CCNC: 18 U/L (ref 0–70)
ANION GAP SERPL CALCULATED.3IONS-SCNC: 13 MMOL/L (ref 7–15)
ANTIBODY SCREEN: NEGATIVE
APPEARANCE UR: CLEAR
AST SERPL W P-5'-P-CCNC: 18 U/L (ref 0–45)
BASOPHILS # BLD AUTO: 0 10E3/UL (ref 0–0.2)
BASOPHILS NFR BLD AUTO: 0 %
BILIRUB SERPL-MCNC: 0.9 MG/DL
BILIRUB UR QL STRIP: NEGATIVE
BUN SERPL-MCNC: 10.3 MG/DL (ref 8–23)
CALCIUM SERPL-MCNC: 9.3 MG/DL (ref 8.8–10.2)
CHLORIDE SERPL-SCNC: 98 MMOL/L (ref 98–107)
COLOR UR AUTO: ABNORMAL
CREAT SERPL-MCNC: 0.95 MG/DL (ref 0.67–1.17)
DEPRECATED HCO3 PLAS-SCNC: 24 MMOL/L (ref 22–29)
EGFRCR SERPLBLD CKD-EPI 2021: >90 ML/MIN/1.73M2
EOSINOPHIL # BLD AUTO: 0 10E3/UL (ref 0–0.7)
EOSINOPHIL NFR BLD AUTO: 0 %
ERYTHROCYTE [DISTWIDTH] IN BLOOD BY AUTOMATED COUNT: 13 % (ref 10–15)
GLUCOSE SERPL-MCNC: 87 MG/DL (ref 70–99)
GLUCOSE UR STRIP-MCNC: NEGATIVE MG/DL
HCT VFR BLD AUTO: 45.6 % (ref 40–53)
HGB BLD-MCNC: 15.2 G/DL (ref 13.3–17.7)
HGB UR QL STRIP: NEGATIVE
HOLD SPECIMEN: NORMAL
HOLD SPECIMEN: NORMAL
IMM GRANULOCYTES # BLD: 0 10E3/UL
IMM GRANULOCYTES NFR BLD: 0 %
KETONES UR STRIP-MCNC: 40 MG/DL
LACTATE SERPL-SCNC: 1 MMOL/L (ref 0.7–2)
LEUKOCYTE ESTERASE UR QL STRIP: NEGATIVE
LIPASE SERPL-CCNC: 15 U/L (ref 13–60)
LYMPHOCYTES # BLD AUTO: 2 10E3/UL (ref 0.8–5.3)
LYMPHOCYTES NFR BLD AUTO: 15 %
MAGNESIUM SERPL-MCNC: 2.1 MG/DL (ref 1.7–2.3)
MCH RBC QN AUTO: 30.2 PG (ref 26.5–33)
MCHC RBC AUTO-ENTMCNC: 33.3 G/DL (ref 31.5–36.5)
MCV RBC AUTO: 91 FL (ref 78–100)
MONOCYTES # BLD AUTO: 1.2 10E3/UL (ref 0–1.3)
MONOCYTES NFR BLD AUTO: 9 %
NEUTROPHILS # BLD AUTO: 10.2 10E3/UL (ref 1.6–8.3)
NEUTROPHILS NFR BLD AUTO: 76 %
NITRATE UR QL: NEGATIVE
NRBC # BLD AUTO: 0 10E3/UL
NRBC BLD AUTO-RTO: 0 /100
PH UR STRIP: 7 [PH] (ref 5–7)
PLATELET # BLD AUTO: 289 10E3/UL (ref 150–450)
POTASSIUM SERPL-SCNC: 3.8 MMOL/L (ref 3.4–5.3)
PROT SERPL-MCNC: 7.9 G/DL (ref 6.4–8.3)
RBC # BLD AUTO: 5.04 10E6/UL (ref 4.4–5.9)
RBC URINE: 1 /HPF
SODIUM SERPL-SCNC: 135 MMOL/L (ref 135–145)
SP GR UR STRIP: 1.01 (ref 1–1.03)
SPECIMEN EXPIRATION DATE: NORMAL
TROPONIN T SERPL HS-MCNC: 8 NG/L
UROBILINOGEN UR STRIP-MCNC: NORMAL MG/DL
WBC # BLD AUTO: 13.4 10E3/UL (ref 4–11)
WBC URINE: 1 /HPF

## 2024-05-11 PROCEDURE — 99223 1ST HOSP IP/OBS HIGH 75: CPT | Mod: AI | Performed by: INTERNAL MEDICINE

## 2024-05-11 PROCEDURE — 250N000013 HC RX MED GY IP 250 OP 250 PS 637: Performed by: INTERNAL MEDICINE

## 2024-05-11 PROCEDURE — 258N000003 HC RX IP 258 OP 636: Performed by: EMERGENCY MEDICINE

## 2024-05-11 PROCEDURE — 71260 CT THORAX DX C+: CPT

## 2024-05-11 PROCEDURE — 83735 ASSAY OF MAGNESIUM: CPT | Performed by: INTERNAL MEDICINE

## 2024-05-11 PROCEDURE — 83605 ASSAY OF LACTIC ACID: CPT | Performed by: EMERGENCY MEDICINE

## 2024-05-11 PROCEDURE — 96375 TX/PRO/DX INJ NEW DRUG ADDON: CPT

## 2024-05-11 PROCEDURE — 83690 ASSAY OF LIPASE: CPT | Performed by: EMERGENCY MEDICINE

## 2024-05-11 PROCEDURE — 96374 THER/PROPH/DIAG INJ IV PUSH: CPT | Mod: 59

## 2024-05-11 PROCEDURE — 51798 US URINE CAPACITY MEASURE: CPT

## 2024-05-11 PROCEDURE — 36415 COLL VENOUS BLD VENIPUNCTURE: CPT | Performed by: EMERGENCY MEDICINE

## 2024-05-11 PROCEDURE — 85025 COMPLETE CBC W/AUTO DIFF WBC: CPT | Performed by: EMERGENCY MEDICINE

## 2024-05-11 PROCEDURE — 96376 TX/PRO/DX INJ SAME DRUG ADON: CPT

## 2024-05-11 PROCEDURE — 93005 ELECTROCARDIOGRAM TRACING: CPT

## 2024-05-11 PROCEDURE — 81001 URINALYSIS AUTO W/SCOPE: CPT | Performed by: EMERGENCY MEDICINE

## 2024-05-11 PROCEDURE — 70450 CT HEAD/BRAIN W/O DYE: CPT

## 2024-05-11 PROCEDURE — 250N000011 HC RX IP 250 OP 636: Performed by: EMERGENCY MEDICINE

## 2024-05-11 PROCEDURE — 120N000001 HC R&B MED SURG/OB

## 2024-05-11 PROCEDURE — 99285 EMERGENCY DEPT VISIT HI MDM: CPT | Mod: 25

## 2024-05-11 PROCEDURE — 84484 ASSAY OF TROPONIN QUANT: CPT | Performed by: EMERGENCY MEDICINE

## 2024-05-11 PROCEDURE — 80053 COMPREHEN METABOLIC PANEL: CPT | Performed by: EMERGENCY MEDICINE

## 2024-05-11 PROCEDURE — 87040 BLOOD CULTURE FOR BACTERIA: CPT | Performed by: EMERGENCY MEDICINE

## 2024-05-11 PROCEDURE — 250N000011 HC RX IP 250 OP 636: Performed by: INTERNAL MEDICINE

## 2024-05-11 PROCEDURE — 86900 BLOOD TYPING SEROLOGIC ABO: CPT | Performed by: EMERGENCY MEDICINE

## 2024-05-11 RX ORDER — PIPERACILLIN SODIUM, TAZOBACTAM SODIUM 3; .375 G/15ML; G/15ML
3.38 INJECTION, POWDER, LYOPHILIZED, FOR SOLUTION INTRAVENOUS EVERY 6 HOURS
Status: DISCONTINUED | OUTPATIENT
Start: 2024-05-11 | End: 2024-05-18 | Stop reason: HOSPADM

## 2024-05-11 RX ORDER — IOPAMIDOL 755 MG/ML
500 INJECTION, SOLUTION INTRAVASCULAR ONCE
Status: COMPLETED | OUTPATIENT
Start: 2024-05-11 | End: 2024-05-11

## 2024-05-11 RX ORDER — AMOXICILLIN 250 MG
2 CAPSULE ORAL 2 TIMES DAILY PRN
Status: DISCONTINUED | OUTPATIENT
Start: 2024-05-11 | End: 2024-05-18 | Stop reason: HOSPADM

## 2024-05-11 RX ORDER — PROCHLORPERAZINE 25 MG
25 SUPPOSITORY, RECTAL RECTAL EVERY 12 HOURS PRN
Status: DISCONTINUED | OUTPATIENT
Start: 2024-05-11 | End: 2024-05-18 | Stop reason: HOSPADM

## 2024-05-11 RX ORDER — LIDOCAINE 40 MG/G
CREAM TOPICAL
Status: DISCONTINUED | OUTPATIENT
Start: 2024-05-11 | End: 2024-05-18 | Stop reason: HOSPADM

## 2024-05-11 RX ORDER — OXYCODONE HYDROCHLORIDE 5 MG/1
5 TABLET ORAL EVERY 8 HOURS PRN
Status: DISCONTINUED | OUTPATIENT
Start: 2024-05-11 | End: 2024-05-13

## 2024-05-11 RX ORDER — ACETAMINOPHEN 325 MG/1
325-650 TABLET ORAL EVERY 6 HOURS PRN
Status: DISCONTINUED | OUTPATIENT
Start: 2024-05-11 | End: 2024-05-11

## 2024-05-11 RX ORDER — ACETAMINOPHEN 650 MG/1
650 SUPPOSITORY RECTAL EVERY 4 HOURS PRN
Status: DISCONTINUED | OUTPATIENT
Start: 2024-05-11 | End: 2024-05-18 | Stop reason: HOSPADM

## 2024-05-11 RX ORDER — ACETAMINOPHEN 325 MG/1
650 TABLET ORAL EVERY 4 HOURS PRN
Status: DISCONTINUED | OUTPATIENT
Start: 2024-05-11 | End: 2024-05-18 | Stop reason: HOSPADM

## 2024-05-11 RX ORDER — PROCHLORPERAZINE MALEATE 10 MG
10 TABLET ORAL EVERY 6 HOURS PRN
Status: DISCONTINUED | OUTPATIENT
Start: 2024-05-11 | End: 2024-05-18 | Stop reason: HOSPADM

## 2024-05-11 RX ORDER — ALBUTEROL SULFATE 90 UG/1
2 AEROSOL, METERED RESPIRATORY (INHALATION) EVERY 4 HOURS PRN
Status: DISCONTINUED | OUTPATIENT
Start: 2024-05-11 | End: 2024-05-18 | Stop reason: HOSPADM

## 2024-05-11 RX ORDER — TAMSULOSIN HYDROCHLORIDE 0.4 MG/1
0.4 CAPSULE ORAL AT BEDTIME
Status: DISCONTINUED | OUTPATIENT
Start: 2024-05-11 | End: 2024-05-18 | Stop reason: HOSPADM

## 2024-05-11 RX ORDER — ONDANSETRON 4 MG/1
4 TABLET, ORALLY DISINTEGRATING ORAL EVERY 6 HOURS PRN
Status: DISCONTINUED | OUTPATIENT
Start: 2024-05-11 | End: 2024-05-18 | Stop reason: HOSPADM

## 2024-05-11 RX ORDER — PIPERACILLIN SODIUM, TAZOBACTAM SODIUM 4; .5 G/20ML; G/20ML
4.5 INJECTION, POWDER, LYOPHILIZED, FOR SOLUTION INTRAVENOUS ONCE
Status: COMPLETED | OUTPATIENT
Start: 2024-05-11 | End: 2024-05-11

## 2024-05-11 RX ORDER — PANTOPRAZOLE SODIUM 40 MG/1
40 TABLET, DELAYED RELEASE ORAL
Status: DISCONTINUED | OUTPATIENT
Start: 2024-05-11 | End: 2024-05-11

## 2024-05-11 RX ORDER — ONDANSETRON 2 MG/ML
4 INJECTION INTRAMUSCULAR; INTRAVENOUS EVERY 6 HOURS PRN
Status: DISCONTINUED | OUTPATIENT
Start: 2024-05-11 | End: 2024-05-18 | Stop reason: HOSPADM

## 2024-05-11 RX ORDER — NALOXONE HYDROCHLORIDE 0.4 MG/ML
0.4 INJECTION, SOLUTION INTRAMUSCULAR; INTRAVENOUS; SUBCUTANEOUS
Status: DISCONTINUED | OUTPATIENT
Start: 2024-05-11 | End: 2024-05-18 | Stop reason: HOSPADM

## 2024-05-11 RX ORDER — GABAPENTIN 300 MG/1
300 CAPSULE ORAL 3 TIMES DAILY PRN
Status: DISCONTINUED | OUTPATIENT
Start: 2024-05-11 | End: 2024-05-11

## 2024-05-11 RX ORDER — HYDROMORPHONE HCL IN WATER/PF 6 MG/30 ML
0.4 PATIENT CONTROLLED ANALGESIA SYRINGE INTRAVENOUS
Status: DISCONTINUED | OUTPATIENT
Start: 2024-05-11 | End: 2024-05-18

## 2024-05-11 RX ORDER — NALOXONE HYDROCHLORIDE 0.4 MG/ML
0.2 INJECTION, SOLUTION INTRAMUSCULAR; INTRAVENOUS; SUBCUTANEOUS
Status: DISCONTINUED | OUTPATIENT
Start: 2024-05-11 | End: 2024-05-18 | Stop reason: HOSPADM

## 2024-05-11 RX ORDER — AMOXICILLIN 250 MG
1 CAPSULE ORAL 2 TIMES DAILY PRN
Status: DISCONTINUED | OUTPATIENT
Start: 2024-05-11 | End: 2024-05-18 | Stop reason: HOSPADM

## 2024-05-11 RX ORDER — HYDROMORPHONE HYDROCHLORIDE 1 MG/ML
0.5 INJECTION, SOLUTION INTRAMUSCULAR; INTRAVENOUS; SUBCUTANEOUS ONCE
Status: COMPLETED | OUTPATIENT
Start: 2024-05-11 | End: 2024-05-11

## 2024-05-11 RX ORDER — BENZONATATE 100 MG/1
100 CAPSULE ORAL 3 TIMES DAILY PRN
Status: DISCONTINUED | OUTPATIENT
Start: 2024-05-11 | End: 2024-05-11

## 2024-05-11 RX ORDER — HYDROMORPHONE HCL IN WATER/PF 6 MG/30 ML
0.2 PATIENT CONTROLLED ANALGESIA SYRINGE INTRAVENOUS
Status: DISCONTINUED | OUTPATIENT
Start: 2024-05-11 | End: 2024-05-18

## 2024-05-11 RX ORDER — CALCIUM CARBONATE 500 MG/1
1000 TABLET, CHEWABLE ORAL 4 TIMES DAILY PRN
Status: DISCONTINUED | OUTPATIENT
Start: 2024-05-11 | End: 2024-05-18 | Stop reason: HOSPADM

## 2024-05-11 RX ADMIN — PIPERACILLIN AND TAZOBACTAM 3.38 G: 3; .375 INJECTION, POWDER, FOR SOLUTION INTRAVENOUS at 22:13

## 2024-05-11 RX ADMIN — PIPERACILLIN AND TAZOBACTAM 4.5 G: 4; .5 INJECTION, POWDER, FOR SOLUTION INTRAVENOUS at 15:32

## 2024-05-11 RX ADMIN — IOPAMIDOL 72 ML: 755 INJECTION, SOLUTION INTRAVENOUS at 14:51

## 2024-05-11 RX ADMIN — HYDROMORPHONE HYDROCHLORIDE 0.5 MG: 1 INJECTION, SOLUTION INTRAMUSCULAR; INTRAVENOUS; SUBCUTANEOUS at 13:48

## 2024-05-11 RX ADMIN — SODIUM CHLORIDE 1000 ML: 9 INJECTION, SOLUTION INTRAVENOUS at 13:47

## 2024-05-11 RX ADMIN — HYDROMORPHONE HYDROCHLORIDE 0.5 MG: 1 INJECTION, SOLUTION INTRAMUSCULAR; INTRAVENOUS; SUBCUTANEOUS at 15:31

## 2024-05-11 RX ADMIN — TAMSULOSIN HYDROCHLORIDE 0.4 MG: 0.4 CAPSULE ORAL at 22:13

## 2024-05-11 ASSESSMENT — ACTIVITIES OF DAILY LIVING (ADL)
ADLS_ACUITY_SCORE: 35
ADLS_ACUITY_SCORE: 35
ADLS_ACUITY_SCORE: 22
ADLS_ACUITY_SCORE: 22
ADLS_ACUITY_SCORE: 35
ADLS_ACUITY_SCORE: 22
ADLS_ACUITY_SCORE: 35
ADLS_ACUITY_SCORE: 22
ADLS_ACUITY_SCORE: 33
ADLS_ACUITY_SCORE: 22
ADLS_ACUITY_SCORE: 22

## 2024-05-11 ASSESSMENT — COLUMBIA-SUICIDE SEVERITY RATING SCALE - C-SSRS
1. IN THE PAST MONTH, HAVE YOU WISHED YOU WERE DEAD OR WISHED YOU COULD GO TO SLEEP AND NOT WAKE UP?: NO
2. HAVE YOU ACTUALLY HAD ANY THOUGHTS OF KILLING YOURSELF IN THE PAST MONTH?: NO
6. HAVE YOU EVER DONE ANYTHING, STARTED TO DO ANYTHING, OR PREPARED TO DO ANYTHING TO END YOUR LIFE?: NO

## 2024-05-11 NOTE — ED NOTES
Pt put on call light, stating he was having numbness-tingling and difficulty feeling in fingers. Pointer finger the most significant. Pt stated left hand felt worst and numbness or tingling didn't pass into the hands. Pt also reported a brief episode of head pain aver left eye that has since resolved. MD notified and at bedside to assess. Nurse informed.

## 2024-05-11 NOTE — ED PROVIDER NOTES
Emergency Department Note      History of Present Illness     Chief Complaint  Abdominal Pain    HPI  Pawel Vallejo is a 58 year old male presenting to the emergency department for evaluation of abdominal pain.  Patient reports he has been experiencing abdominal pain for the past 3-4 days.  He notes pain primarily to his lower abdomen.  This has been persistent.  He has not noted any clear exacerbation with consumption of food.  He does acknowledge some decreased urinary frequency, and difficulties emptying his bladder.  Last evening, he noted a feeling of subjective fevers and chills.  He notes a prior history of small bowel obstruction 11 years previous, though states current symptoms feel different than that.  Past surgical history significant for cholecystectomy and repair of diastases rectus.    Independent Historian  None    Review of External Notes  Office visit note reviewed from 4/4/24 for thumb arthritis.    Past Medical History   Medical History and Problem List  Past Medical History:   Diagnosis Date    BPH (benign prostatic hyperplasia)     Chronic back pain     Gastroesophageal reflux disease with esophagitis     History of hypertension     HLD (hyperlipidemia)     Pleurisy        Medications  No current outpatient medications on file.      Surgical History   Past Surgical History:   Procedure Laterality Date    CHOLECYSTECTOMY      DECOMPRESSION, FUSION CERVICAL ANTERIOR TWO LEVELS, COMBINED Left 12/31/2020    Procedure: Cervical 4 to 6 Anterior Cervical Decompression and Fusion with [employer] Plate and Screws, Interbody Device, use of Fluoroscopy, Microscope;  Surgeon: Pawel Irizarry MD;  Location: [facility] OR    GRAFT BONE FROM ILIAC CREST Left 12/31/2020    Procedure: and Left Sided Iliac Crest Autograft;  Surgeon: Pawel Irizarry MD;  Location: [facility] OR    HERNIA REPAIR      Diastasis recti/ abdominal    LEFT shoulder arthroscopic rotator cuff repair, subacromial  decompression and bicep tenodesis  2018     Physical Exam   Patient Vitals for the past 24 hrs:   BP Temp Temp src Pulse Resp SpO2 Weight   05/11/24 1649 -- -- -- -- -- 95 % --   05/11/24 1629 (!) 150/80 99.6  F (37.6  C) Oral 84 22 98 % 77.9 kg (171 lb 11.2 oz)   05/11/24 1233 -- -- -- -- -- -- 77.9 kg (171 lb 11.8 oz)   05/11/24 1231 (!) 134/96 99.5  F (37.5  C) Oral 95 22 98 % --     Physical Exam  General:   Well-nourished   Speaking in full sentences   Appears uncomfortable  Eyes:   Conjunctiva without injection or scleral icterus  ENT:   Moist mucous membranes   Nares patent   Pinnae normal  Neck:   Full ROM   No stiffness appreciated  Resp:   Lungs CTAB   No crackles, wheezing or audible rubs   Good air movement  CV:    Normal rate, regular rhythm   S1 and S2 present   No murmur, gallop or rub  GI:   BS present, hypoactive   Abdomen soft without distention   Diffuse mild tenderness throughout, maximal to lower abdomen   No guarding or rebound tenderness  Skin:   Warm, dry, well perfused   No rashes or open wounds on exposed skin  MSK:   Moves all extremities   No focal deformities or swelling  Neuro:   Alert   Answers questions appropriately   Moves all extremities equally   Gait stable  Psych:   Normal affect, normal mood    Diagnostics   Lab Results   Labs Ordered and Resulted from Time of ED Arrival to Time of ED Departure   CBC WITH PLATELETS AND DIFFERENTIAL - Abnormal       Result Value    WBC Count 13.4 (*)     RBC Count 5.04      Hemoglobin 15.2      Hematocrit 45.6      MCV 91      MCH 30.2      MCHC 33.3      RDW 13.0      Platelet Count 289      % Neutrophils 76      % Lymphocytes 15      % Monocytes 9      % Eosinophils 0      % Basophils 0      % Immature Granulocytes 0      NRBCs per 100 WBC 0      Absolute Neutrophils 10.2 (*)     Absolute Lymphocytes 2.0      Absolute Monocytes 1.2      Absolute Eosinophils 0.0      Absolute Basophils 0.0      Absolute Immature Granulocytes 0.0      Absolute  NRBCs 0.0     ROUTINE UA WITH MICROSCOPIC REFLEX TO CULTURE - Abnormal    Color Urine Light Yellow      Appearance Urine Clear      Glucose Urine Negative      Bilirubin Urine Negative      Ketones Urine 40 (*)     Specific Gravity Urine 1.009      Blood Urine Negative      pH Urine 7.0      Protein Albumin Urine Negative      Urobilinogen Urine Normal      Nitrite Urine Negative      Leukocyte Esterase Urine Negative      RBC Urine 1      WBC Urine 1     COMPREHENSIVE METABOLIC PANEL - Normal    Sodium 135      Potassium 3.8      Carbon Dioxide (CO2) 24      Anion Gap 13      Urea Nitrogen 10.3      Creatinine 0.95      GFR Estimate >90      Calcium 9.3      Chloride 98      Glucose 87      Alkaline Phosphatase 107      AST 18      ALT 18      Protein Total 7.9      Albumin 4.4      Bilirubin Total 0.9     LIPASE - Normal    Lipase 15     LACTIC ACID WHOLE BLOOD - Normal    Lactic Acid 1.0     TROPONIN T, HIGH SENSITIVITY - Normal    Troponin T, High Sensitivity 8     TYPE AND SCREEN, ADULT    ABO/RH(D) O POS      Antibody Screen Negative      SPECIMEN EXPIRATION DATE [id number]     BLOOD CULTURE   BLOOD CULTURE   ABO/RH TYPE AND SCREEN       Imaging  CT Aortic Survey w Contrast   Final Result   IMPRESSION:   1.  No aortic dissection, aneurysm, or pulmonary embolus.   2.  Sigmoid diverticulitis. No organized fluid collection.   3.  Atherosclerotic vascular disease.   4.  Mild emphysematous change.   5.  Ancient granulomatous disease/infection.         Head CT w/o contrast   Final Result   IMPRESSION:   1.  No acute intracranial process.          EKG   ECG results from 05/11/24   EKG 12 lead     Value    Systolic Blood Pressure     Diastolic Blood Pressure     Ventricular Rate 85    Atrial Rate 85    NV Interval 200    QRS Duration 100        QTc 435    P Axis 61    R AXIS 30    T Axis 43    Interpretation ECG      Sinus rhythm  Normal ECG  When compared with ECG of 29-MAY-2021 10:45,  No significant  change was found           Independent Interpretation  None  ED Course    Medications Administered  Medications   oxyCODONE (ROXICODONE) tablet 5 mg (has no administration in time range)   albuterol (PROVENTIL HFA/VENTOLIN HFA) inhaler (has no administration in time range)   tamsulosin (FLOMAX) capsule 0.4 mg (has no administration in time range)   senna-docusate (SENOKOT-S/PERICOLACE) 8.6-50 MG per tablet 1 tablet (has no administration in time range)     Or   senna-docusate (SENOKOT-S/PERICOLACE) 8.6-50 MG per tablet 2 tablet (has no administration in time range)   calcium carbonate (TUMS) chewable tablet 1,000 mg (has no administration in time range)   ondansetron (ZOFRAN ODT) ODT tab 4 mg (has no administration in time range)     Or   ondansetron (ZOFRAN) injection 4 mg (has no administration in time range)   prochlorperazine (COMPAZINE) injection 10 mg (has no administration in time range)     Or   prochlorperazine (COMPAZINE) tablet 10 mg (has no administration in time range)     Or   prochlorperazine (COMPAZINE) suppository 25 mg (has no administration in time range)   piperacillin-tazobactam (ZOSYN) 3.375 g vial to attach to  mL bag (has no administration in time range)   lidocaine 1 % 0.1-1 mL (has no administration in time range)   lidocaine (LMX4) cream (has no administration in time range)   sodium chloride (PF) 0.9% PF flush 3 mL (3 mLs Intracatheter $Given 5/11/24 3098)   sodium chloride (PF) 0.9% PF flush 3 mL (has no administration in time range)   Patient is already receiving mechanical prophylaxis (has no administration in time range)   acetaminophen (TYLENOL) tablet 650 mg (has no administration in time range)     Or   acetaminophen (TYLENOL) Suppository 650 mg (has no administration in time range)   HYDROmorphone (DILAUDID) injection 0.2 mg (has no administration in time range)   HYDROmorphone (DILAUDID) injection 0.4 mg (has no administration in time range)   naloxone (NARCAN) injection  0.2 mg (has no administration in time range)     Or   naloxone (NARCAN) injection 0.4 mg (has no administration in time range)     Or   naloxone (NARCAN) injection 0.2 mg (has no administration in time range)     Or   naloxone (NARCAN) injection 0.4 mg (has no administration in time range)   sodium chloride 0.9% BOLUS 1,000 mL (1,000 mLs Intravenous $New Bag 5/11/24 1347)   HYDROmorphone (PF) (DILAUDID) injection 0.5 mg (0.5 mg Intravenous $Given 5/11/24 1348)   iopamidol (ISOVUE-370) solution 500 mL (72 mLs Intravenous $Given 5/11/24 1451)   sodium chloride (PF) 0.9% PF flush 100 mL (60 mLs Intravenous $Given 5/11/24 1455)   piperacillin-tazobactam (ZOSYN) 4.5 g vial to attach to  mL bag (4.5 g Intravenous $New Bag 5/11/24 1532)   HYDROmorphone (PF) (DILAUDID) injection 0.5 mg (0.5 mg Intravenous $Given 5/11/24 1531)       Procedures  Procedures     Discussion of Management  Admitting Hospitalist, Dr. Chadwick    Social Determinants of Health adding to complexity of care  None    ED Course  ED Course as of 05/11/24 1732   Sat May 11, 2024   1341 Bladder scan reveals 31 ml   1442 Patient re-assessed.  I was notified by nursing staff that patient was complaining of tingling to his left hand.  On further assessment, the patient had described numbness and tingling involving both the right hand and the left hand.  The right hand is currently feeling improved.  Tingling does appear to be improving on his left hand.  He noted this primarily just to the digits 1, 3, and 4 of both hands.  He denies any overt weakness.  Numbness/tingling did not extend more proximally to the MCP joints.  On exam, he demonstrated intact sensation, though felt later on the digits affected.  He exhibited 5/5  strength and no arm drift.  He also complained of a transient right-sided headache that has since resolved.   1520 Patient re-checked.  Results and plan of care reviewed   1537 Spoke with Dr. Chadwick for admission      Medical  Decision Making / Diagnosis   CMS Diagnoses: None    MIPS     None    ANNY  Pawel Vallejo is a 58 year old male presenting to the emergency department for evaluation of abdominal pain.  VS on presentation reveal elevated BP though otherwise are unremarkable.  History, exam, and ED course as outlined above.  With regards to patient's abdominal pain, I suspect this is largely driven from acute diverticulitis, as evidenced by inflammatory changes noted on CT.  Fortunately, no evidence of perforation, or organized fluid collection.  Labs reveal leukocytosis, though normal lactate.  Given symptoms of shaking chills, I considered bacteremia and blood cultures were obtained.  Patient was started empirically on Zosyn.  He has remained hemodynamically stable during his ED course.  During his ED course, he did describe feelings of bilateral finger tingling/numbness.  This was transient and ultimately resolved.  Given the bilateral nature as well as distribution of findings I feel this is unlikely to represent acute cerebral ischemia.  I did consider vascular etiologies, though capillary refill remained less than 3 seconds and radial pulses remain strong.  I did extend imaging to include the chest, and fortunately, no evidence of acute aortic pathology, aneurysm, or other acute abnormality is noted.  Patient also described a headache earlier today in conjunction with this.  Noncontrast head CT negative for acute intracranial hemorrhage.  Results and clinical impression were discussed with patient and spouse.  Although bladder scan did not reveal evidence of significant fluid within the bladder, I question if this is an inaccurate result, as he does have a fairly prominent bladder on CT imaging.  I question if this may be contributing to some of his discomfort, and possible irritation secondary to agitation inflammatory changes from diverticulitis.  A straight cath was performed for bladder decompression.  Urinalysis not  suggestive of infection.  He has acknowledged a mild headache over the past couple of days which I suspect to be largely secondary to the above inflammatory process in his abdomen.  Low suspicion for intracranial catastrophe at this time.  Given his degree of abdominal discomfort, will plan hospital observation for IV antibiotics and close supportive cares.  Case discussed with hospitalist team who has accepted patient for admission.    Disposition  The patient was admitted to the hospital under the care of Dr. Chadwick.     ICD-10 Codes:    ICD-10-CM    1. Diverticulitis  K57.92       2. Bilateral finger numbness  R20.0          [employer]        Carlos Goff MD  05/11/24 1738

## 2024-05-11 NOTE — H&P
[facility]    Hospitalist Admission Note    Name: Pawel Vallejo    MRN: [id number]  YOB: 1965    Age: 58 year old  Date of admission: 5/11/2024  Primary care provider: No Ref-Primary, Physician  Admitting physician : Vishnu Chadwick M.D. ,M.B.A.       Brief summary of admission assessment/MDM:    Pawel Vallejo is a 58 year old  male  with past medical history is significant  for  BPH , chronic back pain , GERD  who presents with 3 days history of abdominal pain.  Evaluation in the emergency department revealed evidence of acute sigmoid diverticulitis.  Patient was admitted to our service for further care.      Assessment and Plan       #1.  Acute sigmoid diverticulitis  -- Presents with few days history of abdominal pain, dark stool, fatigue and chills.  -- Had low-grade fever and leukocytosis in the emergency department.  -- Patient was started on Zosyn in the emergency department, will continue Zosyn, pain control with IV pain medications, IV fluid hydration, monitor closely for complications    Incidental findings that need to be followed by patient's PCP as an outpatient :   Atherosclerotic vascular disease  Mild emphysematous changes and granulomatous disease    Chronic  comorbid medical conditions:    BPH-on tamsulosin, will continue.  Intermittent catheterization as needed for urinary retention  Chronic back pain  Gastroesophageal reflux disease with esophagitis  Hyperlipidemia  Left wrist and hand pain planning surgery in the coming weeks        Care plan   # Admission Status: Will admit patient to hospitalist service as inpatient as patient likely need over two mid night stay  in the hospital.    # Diet:Diet advanced    # Activity:Advance activity as tolerated    # Education/Counseling :Discussed treatment plan with the patient    # Consults:none     # VTE prophylactic measures:prophylaxis against venous thromboembolism with PCD       # Code  Status:Full Code    # Disposition:anticipate discharge to home and Anticipate discharge in 2-4 days    Disclaimer: This note consists of symbols derived from keyboarding, dictation and/or voice recognition software. As a result, there may be errors in the script that have gone undetected. Please consider this when interpreting information found in this chart.             Chief Complaint:     Abdominal pain  History is obtained from the patient          History of Present Illness:      This patient is a 58 year old  male with a significant past medical history of BPH, degenerative joint disease, chronic back pain, hyperlipidemia who presents with the following condition requiring a hospital admission:    Acute sigmoid diverticulitis    Gustavo presents with abdominal pain which has been present for the last 3 to 4 days.  His abdominal pain was lower abdominal pain moderate to severe more on the left side in the lower abdomen.  He has been having some fever and chills but he did not document his temperature.  No nausea or vomiting.  No diarrhea.  Patient has history of colonoscopy but no diagnosis of diverticulosis.  His past medical history is significant for abdominal surgery for rectus muscle tear secondary to severe strength training.  He also had cholecystectomy.  He is planning to get left hand surgery in the coming weeks.                                     Past Medical History:     Past Medical History:   Diagnosis Date    BPH (benign prostatic hyperplasia)     Chronic back pain     Gastroesophageal reflux disease with esophagitis     History of hypertension     HLD (hyperlipidemia)     Pleurisy             Past Surgical History:     Past Surgical History:   Procedure Laterality Date    CHOLECYSTECTOMY      DECOMPRESSION, FUSION CERVICAL ANTERIOR TWO LEVELS, COMBINED Left 12/31/2020    Procedure: Cervical 4 to 6 Anterior Cervical Decompression and Fusion with [employer] Plate and Screws, Interbody Device, use of  Fluoroscopy, Microscope;  Surgeon: Pawel Irizarry MD;  Location: [facility] OR    GRAFT BONE FROM ILIAC CREST Left 12/31/2020    Procedure: and Left Sided Iliac Crest Autograft;  Surgeon: Pawel Irizarry MD;  Location: [facility] OR    HERNIA REPAIR      Diastasis recti/ abdominal    LEFT shoulder arthroscopic rotator cuff repair, subacromial decompression and bicep tenodesis  2018             Social History:     Social History     Tobacco Use    Smoking status: Former     Current packs/day: 0.00     Average packs/day: 0.3 packs/day for 30.0 years (7.5 ttl pk-yrs)     Types: Cigarettes     Start date: 10/1990     Quit date: 10/2020     Years since quitting: 3.6    Smokeless tobacco: Never    Tobacco comments:     quit in 2012. restarted in the past couple of years and then smoked more socially.   Substance Use Topics    Alcohol use: Yes     Comment: socially             Family History:     Reviewed and non contributory        Allergies:   No Known Allergies          Medications:        Prior to Admission medications    Medication Sig Last Dose Taking? Auth Provider Long Term End Date   acetaminophen (TYLENOL) 325 MG tablet Take 325-650 mg by mouth every 6 hours as needed for mild pain   Reported, Patient     albuterol (PROAIR HFA/PROVENTIL HFA/VENTOLIN HFA) 108 (90 Base) MCG/ACT inhaler Inhale 2 puffs into the lungs every 4 hours as needed for shortness of breath / dyspnea or wheezing   Unknown, Entered By History Yes    Ascorbic Acid (VITAMIN C PO) Take 1 tablet by mouth daily   Unknown, Entered By History     benzonatate (TESSALON) 100 MG capsule Take 1 capsule (100 mg) by mouth 3 times daily as needed for cough   Aramis Hunter MD     benzonatate (TESSALON) 100 MG capsule Take 1 capsule (100 mg) by mouth 3 times daily as needed for cough   Aramis Hunter MD     Cholecalciferol (VITAMIN D3 PO) Take 1 tablet by mouth daily   Unknown, Entered By History No    clobetasol (TEMOVATE) 0.05 % external  ointment Apply topically nightly as needed To psoriasis on foot   Unknown, Entered By History     gabapentin (NEURONTIN) 300 MG capsule TAKE 1 CAPSULE (300 MG) BY MOUTH 3 TIMES DAILY AS NEEDED FOR NEUROPATHIC PAIN (CERVICAL NERVE PAIN)   Pawel Irizarry MD Yes    KRILL OIL PO Take 1 capsule by mouth daily    Unknown, Entered By History     Multiple Vitamins-Minerals (ZINC PO) Take 1 tablet by mouth daily   Unknown, Entered By History     naproxen (NAPROSYN) 500 MG tablet Take 500 mg by mouth once as needed for moderate pain   Reported, Patient No    oxyCODONE (ROXICODONE) 5 MG tablet Take 1 tablet (5 mg) by mouth every 8 hours as needed for breakthrough pain   Aramis Hunter MD No    pantoprazole (PROTONIX) 40 MG EC tablet Take 1 tablet (40 mg) by mouth 2 times daily (before meals)   Aramis Hunter MD     tamsulosin (FLOMAX) 0.4 MG capsule Take 0.4 mg by mouth daily    Reported, Patient No    tiZANidine (ZANAFLEX) 4 MG tablet Take 1 tablet (4 mg) by mouth 3 times daily   Pawel Irizarry MD No    VITAMIN A PO Take 1 tablet by mouth daily   Unknown, Entered By History No           Review of Systems:     A Comprehensive greater than 10 system review of systems was carried out.  Pertinent positives and negatives are noted above in HPI.  Otherwise negative for contributory information.           Physical Exam:     Vital signs were reviewed    Temp:  [99.5  F (37.5  C)] 99.5  F (37.5  C)  Pulse:  [95] 95  Resp:  [22] 22  BP: (134)/(96) 134/96  SpO2:  [98 %] 98 %        GEN: awake, alert, cooperative, no apparent distress, oriented x 3    NECK:Supple ,no mass or thyromegaly     HEENT:  Normocephalic/atraumatic, no scleral icterus, no nasal discharge, mouth moist.    CV:  Regular rate and rhythm, no murmur or JVD.  S1 + S2 noted, no S3 or S4.    LUNGS:  Clear to auscultation bilaterally without rales/rhonchi/wheezing/retractions.  Symmetric chest rise on inhalation noted.    ABD: Abdomen is  soft, nondistended.  Tenderness noted into the lower abdomen more on the left lower quadrant.  No rebound tenderness, rigidity or guarding.  Bowel sounds normoactive.  EXT:  No edema.  No cyanosis.  No joint synovitis noted.Lower extremity pulses are normal bilaterally and     LGS: No cervical or axillary lymphadenopathy     SKIN:  Dry to touch, warm ,no exanthems noted in the visualized areas.    Neurologic:Grossly intact,non focal . No acute focal neurologic deficit     Psychaitric exam: Mood and affect normal     Additional significant  Findings:             Data:       All laboratory and imaging data in the past 24 hours reviewed     All laboratory data reviewed  All laboratory and imaging data in the past 24 hours reviewed  Results for orders placed or performed during the hospital encounter of 05/11/24   Head CT w/o contrast     Status: None    Narrative    EXAM: CT HEAD W/O CONTRAST  LOCATION: [facility]  DATE: 5/11/2024    INDICATION: headache, finger numbness  COMPARISON: None  TECHNIQUE: Routine CT Head without IV contrast. Multiplanar reformats. Dose reduction techniques were used.    FINDINGS:  INTRACRANIAL CONTENTS: No intracranial hemorrhage, extraaxial collection, or mass effect.  No CT evidence of acute infarct. Normal parenchymal attenuation. Normal ventricles and sulci.     VISUALIZED ORBITS/SINUSES/MASTOIDS: No intraorbital abnormality. No paranasal sinus mucosal disease. No middle ear or mastoid effusion.    BONES/SOFT TISSUES: No acute abnormality.       Impression    IMPRESSION:  1.  No acute intracranial process.   CT Aortic Survey w Contrast     Status: None    Narrative    EXAM: CT AORTIC SURVEY WITH CONTRAST  LOCATION: [facility]  DATE: 05/11/2024    INDICATION: Abdomen pain. ?Upper extremity symptoms.  COMPARISON: CT protocol performed 05/25/2021.  TECHNIQUE: CT angiogram chest abdomen pelvis during arterial phase of injection of IV contrast.  2D and 3D MIP reconstructions were performed by the CT technologist. Dose reduction techniques were used.   CONTRAST: 72 mL Isovue 370.    FINDINGS:   CT ANGIOGRAM CHEST, ABDOMEN, AND PELVIS: No mural hematoma. Mildly tortuous atherosclerotic aorta. No aortic dissection or aneurysm. Patent celiac, superior mesenteric, renal, and inferior mesenteric arteries. Patent iliac arteries. No central pulmonary   embolus.    LUNGS AND PLEURA: Mild centrilobular emphysematous change. Scattered calcified granulomata. No pneumothorax or pleural effusion.    MEDIASTINUM/AXILLAE: Normal size heart. No pericardial effusion. No hilar or mediastinal lymphadenopathy. Calcified mediastinal and hilar lymph nodes.    CORONARY ARTERY CALCIFICATION: None.    HEPATOBILIARY: Status post cholecystectomy.    PANCREAS: Normal.    SPLEEN: Normal.    ADRENAL GLANDS: Normal.    KIDNEYS/BLADDER: Normal.    BOWEL: Short segment mural thickening with an inflamed diverticulum of the sigmoid colon and adjacent inflammatory change, highly concerning for diverticulitis. No organized fluid collection. No free air. Normal appendix. No obstruction.    LYMPH NODES: Normal.    PELVIC ORGANS: Normal.    MUSCULOSKELETAL: Mild multilevel discogenic degenerative change.      Impression    IMPRESSION:  1.  No aortic dissection, aneurysm, or pulmonary embolus.  2.  Sigmoid diverticulitis. No organized fluid collection.  3.  Atherosclerotic vascular disease.  4.  Mild emphysematous change.  5.  Ancient granulomatous disease/infection.     Comprehensive metabolic panel     Status: Normal   Result Value Ref Range    Sodium 135 135 - 145 mmol/L    Potassium 3.8 3.4 - 5.3 mmol/L    Carbon Dioxide (CO2) 24 22 - 29 mmol/L    Anion Gap 13 7 - 15 mmol/L    Urea Nitrogen 10.3 8.0 - 23.0 mg/dL    Creatinine 0.95 0.67 - 1.17 mg/dL    GFR Estimate >90 >60 mL/min/1.73m2    Calcium 9.3 8.8 - 10.2 mg/dL    Chloride 98 98 - 107 mmol/L    Glucose 87 70 - 99 mg/dL    Alkaline  Phosphatase 107 40 - 150 U/L    AST 18 0 - 45 U/L    ALT 18 0 - 70 U/L    Protein Total 7.9 6.4 - 8.3 g/dL    Albumin 4.4 3.5 - 5.2 g/dL    Bilirubin Total 0.9 <=1.2 mg/dL   Lipase     Status: Normal   Result Value Ref Range    Lipase 15 13 - 60 U/L   [place] Draw     Status: None    Narrative    The following orders were created for panel order [place] Draw.  Procedure                               Abnormality         Status                     ---------                               -----------         ------                     Extra Blue Top Tube[[id number]]                              Final result               Extra Red Top Tube[[id number]]                               Final result                 Please view results for these tests on the individual orders.   CBC with platelets and differential     Status: Abnormal   Result Value Ref Range    WBC Count 13.4 (H) 4.0 - 11.0 10e3/uL    RBC Count 5.04 4.40 - 5.90 10e6/uL    Hemoglobin 15.2 13.3 - 17.7 g/dL    Hematocrit 45.6 40.0 - 53.0 %    MCV 91 78 - 100 fL    MCH 30.2 26.5 - 33.0 pg    MCHC 33.3 31.5 - 36.5 g/dL    RDW 13.0 10.0 - 15.0 %    Platelet Count 289 150 - 450 10e3/uL    % Neutrophils 76 %    % Lymphocytes 15 %    % Monocytes 9 %    % Eosinophils 0 %    % Basophils 0 %    % Immature Granulocytes 0 %    NRBCs per 100 WBC 0 <1 /100    Absolute Neutrophils 10.2 (H) 1.6 - 8.3 10e3/uL    Absolute Lymphocytes 2.0 0.8 - 5.3 10e3/uL    Absolute Monocytes 1.2 0.0 - 1.3 10e3/uL    Absolute Eosinophils 0.0 0.0 - 0.7 10e3/uL    Absolute Basophils 0.0 0.0 - 0.2 10e3/uL    Absolute Immature Granulocytes 0.0 <=0.4 10e3/uL    Absolute NRBCs 0.0 10e3/uL   Extra Blue Top Tube     Status: None   Result Value Ref Range    Hold Specimen JIC    Extra Red Top Tube     Status: None   Result Value Ref Range    Hold Specimen JI    Lactic acid whole blood     Status: Normal   Result Value Ref Range    Lactic Acid 1.0 0.7 - 2.0 mmol/L   UA with Microscopic reflex to Culture      Status: Abnormal    Specimen: Urine, Catheter   Result Value Ref Range    Color Urine Light Yellow Colorless, Straw, Light Yellow, Yellow    Appearance Urine Clear Clear    Glucose Urine Negative Negative mg/dL    Bilirubin Urine Negative Negative    Ketones Urine 40 (A) Negative mg/dL    Specific Gravity Urine 1.009 1.003 - 1.035    Blood Urine Negative Negative    pH Urine 7.0 5.0 - 7.0    Protein Albumin Urine Negative Negative mg/dL    Urobilinogen Urine Normal Normal, 2.0 mg/dL    Nitrite Urine Negative Negative    Leukocyte Esterase Urine Negative Negative    RBC Urine 1 <=2 /HPF    WBC Urine 1 <=5 /HPF    Narrative    Urine Culture not indicated   Troponin T, High Sensitivity (now)     Status: Normal   Result Value Ref Range    Troponin T, High Sensitivity 8 <=22 ng/L   Magnesium     Status: Normal   Result Value Ref Range    Magnesium 2.1 1.7 - 2.3 mg/dL   EKG 12 lead     Status: None (Preliminary result)   Result Value Ref Range    Systolic Blood Pressure  mmHg    Diastolic Blood Pressure  mmHg    Ventricular Rate 85 BPM    Atrial Rate 85 BPM    ID Interval 200 ms    QRS Duration 100 ms     ms    QTc 435 ms    P Axis 61 degrees    R AXIS 30 degrees    T Axis 43 degrees    Interpretation ECG       Sinus rhythm  Normal ECG  When compared with ECG of 29-MAY-2021 10:45,  No significant change was found     Adult Type and Screen     Status: None   Result Value Ref Range    ABO/RH(D) O POS     Antibody Screen Negative Negative    SPECIMEN EXPIRATION DATE [id number]    CBC + differential     Status: Abnormal    Narrative    The following orders were created for panel order CBC + differential.  Procedure                               Abnormality         Status                     ---------                               -----------         ------                     CBC with platelets and d...[[id number]]  Abnormal            Final result                 Please view results for these tests on the  individual orders.   ABO/Rh type and screen     Status: None    Narrative    The following orders were created for panel order ABO/Rh type and screen.  Procedure                               Abnormality         Status                     ---------                               -----------         ------                     Adult Type and Screen[[id number]]                            Final result                 Please view results for these tests on the individual orders.          Recent Results (from the past 48 hour(s))   Head CT w/o contrast    Narrative    EXAM: CT HEAD W/O CONTRAST  LOCATION: [facility]  DATE: 5/11/2024    INDICATION: headache, finger numbness  COMPARISON: None  TECHNIQUE: Routine CT Head without IV contrast. Multiplanar reformats. Dose reduction techniques were used.    FINDINGS:  INTRACRANIAL CONTENTS: No intracranial hemorrhage, extraaxial collection, or mass effect.  No CT evidence of acute infarct. Normal parenchymal attenuation. Normal ventricles and sulci.     VISUALIZED ORBITS/SINUSES/MASTOIDS: No intraorbital abnormality. No paranasal sinus mucosal disease. No middle ear or mastoid effusion.    BONES/SOFT TISSUES: No acute abnormality.       Impression    IMPRESSION:  1.  No acute intracranial process.   CT Aortic Survey w Contrast    Narrative    EXAM: CT AORTIC SURVEY WITH CONTRAST  LOCATION: [facility]  DATE: 05/11/2024    INDICATION: Abdomen pain. ?Upper extremity symptoms.  COMPARISON: CT protocol performed 05/25/2021.  TECHNIQUE: CT angiogram chest abdomen pelvis during arterial phase of injection of IV contrast. 2D and 3D MIP reconstructions were performed by the CT technologist. Dose reduction techniques were used.   CONTRAST: 72 mL Isovue 370.    FINDINGS:   CT ANGIOGRAM CHEST, ABDOMEN, AND PELVIS: No mural hematoma. Mildly tortuous atherosclerotic aorta. No aortic dissection or aneurysm. Patent celiac, superior mesenteric, renal,  and inferior mesenteric arteries. Patent iliac arteries. No central pulmonary   embolus.    LUNGS AND PLEURA: Mild centrilobular emphysematous change. Scattered calcified granulomata. No pneumothorax or pleural effusion.    MEDIASTINUM/AXILLAE: Normal size heart. No pericardial effusion. No hilar or mediastinal lymphadenopathy. Calcified mediastinal and hilar lymph nodes.    CORONARY ARTERY CALCIFICATION: None.    HEPATOBILIARY: Status post cholecystectomy.    PANCREAS: Normal.    SPLEEN: Normal.    ADRENAL GLANDS: Normal.    KIDNEYS/BLADDER: Normal.    BOWEL: Short segment mural thickening with an inflamed diverticulum of the sigmoid colon and adjacent inflammatory change, highly concerning for diverticulitis. No organized fluid collection. No free air. Normal appendix. No obstruction.    LYMPH NODES: Normal.    PELVIC ORGANS: Normal.    MUSCULOSKELETAL: Mild multilevel discogenic degenerative change.      Impression    IMPRESSION:  1.  No aortic dissection, aneurysm, or pulmonary embolus.  2.  Sigmoid diverticulitis. No organized fluid collection.  3.  Atherosclerotic vascular disease.  4.  Mild emphysematous change.  5.  Ancient granulomatous disease/infection.          All imaging studies reviewed by me.         Patient`s old medical records reviewed and case discussed with the ED physician.    ED course-Reviewed  and care plan discussed with Dr. Goff

## 2024-05-11 NOTE — PHARMACY-ADMISSION MEDICATION HISTORY
Pharmacist Admission Medication History    Admission medication history is complete. The information provided in this note is only as accurate as the sources available at the time of the update.    Information Source(s): Patient, Family member, and CareEverywhere/SureScripts via in-person    Pertinent Information: patient confirms the only prescription med he is taking is Flomax.    Changes made to PTA medication list:  Added: None  Deleted: Tylenol, albuterol inhaler, benzonatate caps, gabapentin, MVT, naproxen, oxycodone, Protonix, tizanidine, vitamin A  Changed: None    Allergies reviewed with patient and updates made in EHR: yes    Medication History Completed By: Deric Hua RPH 5/11/2024 4:43 PM    PTA Med List   Medication Sig Last Dose    Ascorbic Acid (VITAMIN C PO) Take 1 tablet by mouth daily 5/10/2024    Cholecalciferol (VITAMIN D3 PO) Take 1 tablet by mouth daily 5/10/2024    clobetasol (TEMOVATE) 0.05 % external ointment Apply topically nightly as needed To psoriasis on foot Past Week at PRN    KRILL OIL PO Take 1 capsule by mouth daily  5/10/2024    tamsulosin (FLOMAX) 0.4 MG capsule Take 0.4 mg by mouth daily  5/10/2024 at ?

## 2024-05-11 NOTE — ED TRIAGE NOTES
Pt reports severe abdominal pain x3 days. Dark black stool x2 days. Felt very chilled and fatigued yesterday. Hx sbo many years ago. Alert and ambulatory. Appears uncomfortable.

## 2024-05-11 NOTE — ED NOTES
[facility]  ED Nurse Handoff Report    ED Chief complaint: Abdominal Pain  . ED Diagnosis:   Final diagnoses:   Diverticulitis   Bilateral finger numbness       Allergies: No Known Allergies    Code Status: Full Code    Activity level - Baseline/Home:  independent.  Activity Level - Current:   standby.   Lift room needed: No.   Bariatric: No   Needed: No   Isolation: No.   Infection: Not Applicable.     Respiratory status: Room air    Vital Signs (within 30 minutes):   Vitals:    05/11/24 1231 05/11/24 1233   BP: (!) 134/96    Pulse: 95    Resp: 22    Temp: 99.5  F (37.5  C)    TempSrc: Oral    SpO2: 98%    Weight:  77.9 kg (171 lb 11.8 oz)       Cardiac Rhythm:  ,      Pain level:    Patient confused: No.   Patient Falls Risk: patient and family education.   Elimination Status: Has voided     Patient Report - Initial Complaint: Generalized abdominal pain, dark stools   Focused Assessment: nausea, tender acute abdomen, melena     Abnormal Results:   Labs Ordered and Resulted from Time of ED Arrival to Time of ED Departure   CBC WITH PLATELETS AND DIFFERENTIAL - Abnormal       Result Value    WBC Count 13.4 (*)     RBC Count 5.04      Hemoglobin 15.2      Hematocrit 45.6      MCV 91      MCH 30.2      MCHC 33.3      RDW 13.0      Platelet Count 289      % Neutrophils 76      % Lymphocytes 15      % Monocytes 9      % Eosinophils 0      % Basophils 0      % Immature Granulocytes 0      NRBCs per 100 WBC 0      Absolute Neutrophils 10.2 (*)     Absolute Lymphocytes 2.0      Absolute Monocytes 1.2      Absolute Eosinophils 0.0      Absolute Basophils 0.0      Absolute Immature Granulocytes 0.0      Absolute NRBCs 0.0     ROUTINE UA WITH MICROSCOPIC REFLEX TO CULTURE - Abnormal    Color Urine Light Yellow      Appearance Urine Clear      Glucose Urine Negative      Bilirubin Urine Negative      Ketones Urine 40 (*)     Specific Gravity Urine 1.009      Blood Urine Negative      pH Urine  7.0      Protein Albumin Urine Negative      Urobilinogen Urine Normal      Nitrite Urine Negative      Leukocyte Esterase Urine Negative      RBC Urine 1      WBC Urine 1     COMPREHENSIVE METABOLIC PANEL - Normal    Sodium 135      Potassium 3.8      Carbon Dioxide (CO2) 24      Anion Gap 13      Urea Nitrogen 10.3      Creatinine 0.95      GFR Estimate >90      Calcium 9.3      Chloride 98      Glucose 87      Alkaline Phosphatase 107      AST 18      ALT 18      Protein Total 7.9      Albumin 4.4      Bilirubin Total 0.9     LIPASE - Normal    Lipase 15     LACTIC ACID WHOLE BLOOD - Normal    Lactic Acid 1.0     TROPONIN T, HIGH SENSITIVITY - Normal    Troponin T, High Sensitivity 8     TYPE AND SCREEN, ADULT    ABO/RH(D) O POS      Antibody Screen Negative      SPECIMEN EXPIRATION DATE [id number]     BLOOD CULTURE   BLOOD CULTURE   ABO/RH TYPE AND SCREEN        CT Aortic Survey w Contrast   Final Result   IMPRESSION:   1.  No aortic dissection, aneurysm, or pulmonary embolus.   2.  Sigmoid diverticulitis. No organized fluid collection.   3.  Atherosclerotic vascular disease.   4.  Mild emphysematous change.   5.  Ancient granulomatous disease/infection.         Head CT w/o contrast   Final Result   IMPRESSION:   1.  No acute intracranial process.          Treatments provided: diagnostics, pain medication, straight catheter  Family Comments: Wife at bedside  OBS brochure/video discussed/provided to patient:  N/A  ED Medications:   Medications   piperacillin-tazobactam (ZOSYN) 4.5 g vial to attach to  mL bag (4.5 g Intravenous $New Bag 5/11/24 1532)   sodium chloride 0.9% BOLUS 1,000 mL (1,000 mLs Intravenous $New Bag 5/11/24 1347)   HYDROmorphone (PF) (DILAUDID) injection 0.5 mg (0.5 mg Intravenous $Given 5/11/24 1348)   iopamidol (ISOVUE-370) solution 500 mL (72 mLs Intravenous $Given 5/11/24 1451)   sodium chloride (PF) 0.9% PF flush 100 mL (60 mLs Intravenous $Given 5/11/24 1455)   HYDROmorphone  (PF) (DILAUDID) injection 0.5 mg (0.5 mg Intravenous $Given 5/11/24 9837)       Drips infusing:  No  For the majority of the shift this patient was Green.   Interventions performed were N/A.    Sepsis treatment initiated: No    Cares/treatment/interventions/medications to be completed following ED care: education, pain control, reassurance, diet control    ED Nurse Name: Jase Landa RN  4:01 PM    RECEIVING UNIT ED HANDOFF REVIEW    Above ED Nurse Handoff Report was reviewed: Yes  Reviewed by: JERMAINE ACEVES RN on May 11, 2024 at 4:18 PM   VON Douglas called the ED to inform them the note was read: Yes

## 2024-05-12 LAB
ANION GAP SERPL CALCULATED.3IONS-SCNC: 9 MMOL/L (ref 7–15)
BUN SERPL-MCNC: 9.5 MG/DL (ref 8–23)
CALCIUM SERPL-MCNC: 8.5 MG/DL (ref 8.8–10.2)
CHLORIDE SERPL-SCNC: 103 MMOL/L (ref 98–107)
CREAT SERPL-MCNC: 1 MG/DL (ref 0.67–1.17)
DEPRECATED HCO3 PLAS-SCNC: 26 MMOL/L (ref 22–29)
EGFRCR SERPLBLD CKD-EPI 2021: 86 ML/MIN/1.73M2
ERYTHROCYTE [DISTWIDTH] IN BLOOD BY AUTOMATED COUNT: 12.8 % (ref 10–15)
GLUCOSE SERPL-MCNC: 102 MG/DL (ref 70–99)
HCT VFR BLD AUTO: 40.3 % (ref 40–53)
HGB BLD-MCNC: 13.5 G/DL (ref 13.3–17.7)
MAGNESIUM SERPL-MCNC: 2.2 MG/DL (ref 1.7–2.3)
MCH RBC QN AUTO: 30.3 PG (ref 26.5–33)
MCHC RBC AUTO-ENTMCNC: 33.5 G/DL (ref 31.5–36.5)
MCV RBC AUTO: 91 FL (ref 78–100)
PLATELET # BLD AUTO: 241 10E3/UL (ref 150–450)
POTASSIUM SERPL-SCNC: 4.3 MMOL/L (ref 3.4–5.3)
RBC # BLD AUTO: 4.45 10E6/UL (ref 4.4–5.9)
SODIUM SERPL-SCNC: 138 MMOL/L (ref 135–145)
WBC # BLD AUTO: 7.9 10E3/UL (ref 4–11)

## 2024-05-12 PROCEDURE — 99232 SBSQ HOSP IP/OBS MODERATE 35: CPT | Performed by: INTERNAL MEDICINE

## 2024-05-12 PROCEDURE — 250N000011 HC RX IP 250 OP 636: Performed by: INTERNAL MEDICINE

## 2024-05-12 PROCEDURE — 120N000001 HC R&B MED SURG/OB

## 2024-05-12 PROCEDURE — 36415 COLL VENOUS BLD VENIPUNCTURE: CPT | Performed by: INTERNAL MEDICINE

## 2024-05-12 PROCEDURE — 85014 HEMATOCRIT: CPT | Performed by: INTERNAL MEDICINE

## 2024-05-12 PROCEDURE — 80048 BASIC METABOLIC PNL TOTAL CA: CPT | Performed by: INTERNAL MEDICINE

## 2024-05-12 PROCEDURE — 83735 ASSAY OF MAGNESIUM: CPT | Performed by: INTERNAL MEDICINE

## 2024-05-12 PROCEDURE — 250N000013 HC RX MED GY IP 250 OP 250 PS 637: Performed by: INTERNAL MEDICINE

## 2024-05-12 RX ADMIN — HYDROMORPHONE HYDROCHLORIDE 0.2 MG: 0.2 INJECTION, SOLUTION INTRAMUSCULAR; INTRAVENOUS; SUBCUTANEOUS at 12:40

## 2024-05-12 RX ADMIN — HYDROMORPHONE HYDROCHLORIDE 0.4 MG: 0.2 INJECTION, SOLUTION INTRAMUSCULAR; INTRAVENOUS; SUBCUTANEOUS at 06:17

## 2024-05-12 RX ADMIN — HYDROMORPHONE HYDROCHLORIDE 0.2 MG: 0.2 INJECTION, SOLUTION INTRAMUSCULAR; INTRAVENOUS; SUBCUTANEOUS at 16:01

## 2024-05-12 RX ADMIN — TAMSULOSIN HYDROCHLORIDE 0.4 MG: 0.4 CAPSULE ORAL at 21:54

## 2024-05-12 RX ADMIN — HYDROMORPHONE HYDROCHLORIDE 0.4 MG: 0.2 INJECTION, SOLUTION INTRAMUSCULAR; INTRAVENOUS; SUBCUTANEOUS at 20:54

## 2024-05-12 RX ADMIN — PIPERACILLIN AND TAZOBACTAM 3.38 G: 3; .375 INJECTION, POWDER, FOR SOLUTION INTRAVENOUS at 10:12

## 2024-05-12 RX ADMIN — HYDROMORPHONE HYDROCHLORIDE 0.4 MG: 0.2 INJECTION, SOLUTION INTRAMUSCULAR; INTRAVENOUS; SUBCUTANEOUS at 09:51

## 2024-05-12 RX ADMIN — OXYCODONE HYDROCHLORIDE 5 MG: 5 TABLET ORAL at 07:54

## 2024-05-12 RX ADMIN — PIPERACILLIN AND TAZOBACTAM 3.38 G: 3; .375 INJECTION, POWDER, FOR SOLUTION INTRAVENOUS at 04:09

## 2024-05-12 RX ADMIN — PIPERACILLIN AND TAZOBACTAM 3.38 G: 3; .375 INJECTION, POWDER, FOR SOLUTION INTRAVENOUS at 21:54

## 2024-05-12 RX ADMIN — HYDROMORPHONE HYDROCHLORIDE 0.2 MG: 0.2 INJECTION, SOLUTION INTRAMUSCULAR; INTRAVENOUS; SUBCUTANEOUS at 04:14

## 2024-05-12 RX ADMIN — SENNOSIDES AND DOCUSATE SODIUM 1 TABLET: 8.6; 5 TABLET ORAL at 14:41

## 2024-05-12 RX ADMIN — SENNOSIDES AND DOCUSATE SODIUM 2 TABLET: 8.6; 5 TABLET ORAL at 20:54

## 2024-05-12 RX ADMIN — PIPERACILLIN AND TAZOBACTAM 3.38 G: 3; .375 INJECTION, POWDER, FOR SOLUTION INTRAVENOUS at 16:02

## 2024-05-12 ASSESSMENT — ACTIVITIES OF DAILY LIVING (ADL)
ADLS_ACUITY_SCORE: 22

## 2024-05-12 NOTE — PLAN OF CARE
"Pertinent assessments: Assumed cares from ED approximately 1620 until 2330. Pt A&Ox4. VSS with BP slightly elevated, on RA. C/o pain in abd,declines pain medication at this time. SBA to BR. Mag and K+ protocols, recheck in AM. Regular diet, well tolerated. PIV SL. Psoriasis on feet.   Major Shift Events admit to unit  Treatment Plan: IV zosyn, pain management  Bedside Nurse: JERMAINE ACEVES RN       Goal Outcome Evaluation:      Plan of Care Reviewed With: patient    Overall Patient Progress: improvingOverall Patient Progress: improving    Outcome Evaluation: pain tolerable, tolerating reg diet      Problem: Adult Inpatient Plan of Care  Goal: Plan of Care Review  Description: The Plan of Care Review/Shift note should be completed every shift.  The Outcome Evaluation is a brief statement about your assessment that the patient is improving, declining, or no change.  This information will be displayed automatically on your shift  note.  Outcome: Progressing  Flowsheets (Taken 5/11/2024 2310)  Outcome Evaluation: pain tolerable, tolerating reg diet  Plan of Care Reviewed With: patient  Overall Patient Progress: improving  Goal: Patient-Specific Goal (Individualized)  Description: You can add care plan individualizations to a care plan. Examples of Individualization might be:  \"Parent requests to be called daily at 9am for status\", \"I have a hard time hearing out of my right ear\", or \"Do not touch me to wake me up as it startles  me\".  Outcome: Progressing  Goal: Absence of Hospital-Acquired Illness or Injury  Outcome: Progressing  Intervention: Identify and Manage Fall Risk  Recent Flowsheet Documentation  Taken 5/11/2024 1649 by Jermaine Aceves, RN  Safety Promotion/Fall Prevention:   activity supervised   nonskid shoes/slippers when out of bed   room door open   room near nurse's station   safety round/check completed  Intervention: Prevent Skin Injury  Recent Flowsheet Documentation  Taken 5/11/2024 1649 by Garth, " JOSE Cabrera  Body Position: position changed independently  Intervention: Prevent Infection  Recent Flowsheet Documentation  Taken 5/11/2024 1649 by Deborah Liang RN  Infection Prevention:   cohorting utilized   hand hygiene promoted   rest/sleep promoted  Goal: Optimal Comfort and Wellbeing  Outcome: Progressing  Intervention: Monitor Pain and Promote Comfort  Recent Flowsheet Documentation  Taken 5/11/2024 1649 by Deborah Liang RN  Pain Management Interventions: medication offered but refused  Goal: Readiness for Transition of Care  Outcome: Progressing  Intervention: Mutually Develop Transition Plan  Recent Flowsheet Documentation  Taken 5/11/2024 1700 by Deborah Liang RN  Equipment Currently Used at Home: none     Problem: Intestinal Obstruction  Goal: Optimal Bowel Function  Outcome: Progressing  Intervention: Promote Bowel Function  Recent Flowsheet Documentation  Taken 5/11/2024 1649 by Deborah Liang RN  Body Position: position changed independently  Head of Bed (HOB) Positioning: HOB at 20 degrees  Positioning/Transfer Devices:   pillows   in use  Goal: Fluid and Electrolyte Balance  Outcome: Progressing  Goal: Absence of Infection Signs and Symptoms  Outcome: Progressing  Goal: Optimize Nutrition Status  Outcome: Progressing  Goal: Optimal Pain Control and Function  Outcome: Progressing  Intervention: Prevent or Manage Pain  Recent Flowsheet Documentation  Taken 5/11/2024 1649 by Deborah Liang RN  Pain Management Interventions: medication offered but refused     Problem: Pain Acute  Goal: Optimal Pain Control and Function  Outcome: Progressing  Intervention: Develop Pain Management Plan  Recent Flowsheet Documentation  Taken 5/11/2024 1649 by Deborah Liang RN  Pain Management Interventions: medication offered but refused  Intervention: Prevent or Manage Pain  Recent Flowsheet Documentation  Taken 5/11/2024 1649 by Deborah Liang RN  Medication Review/Management: medications reviewed

## 2024-05-12 NOTE — PROGRESS NOTES
[facility]    Hospitalist Progress Note  Name: Pawel Vallejo    MRN: [id number]  Provider: Radha Ryan MD  Date of Service: 05/12/2024    Assessment & Plan   Summary of Stay: Pawel Vallejo is a 58 year old male who was admitted on 5/11/2024 for sigmoid diverticulitis.  Patient's past medical history significant for BPH, chronic back pain, GERD, constipation presented with right lower quadrant abdominal pain.  Valuation the emergency room showed sigmoid diverticulitis.    Acute sigmoid diverticulitis  -Pain is now better controlled  -Tolerating p.o.  -Continue as needed pain meds  -IV antibiotics    Imaging in ED showed/incidental findings  Atherosclerotic vascular disease  Emphysematous change and granulomatous change involving lungs  -Will defer to PCP on discharge further workup and restratification    DVT Prophylaxis: Pneumatic Compression Devices  Code Status: Full Code    Disposition: Expected discharge likely tomorrow if continues to improve      Interval History   Assumed care reviewed chart.  Pain is somewhat better still has about 3 out of 10 pain in right lower quadrant no nausea no vomiting tolerating p.o.  This is his first diagnosis of diverticulitis was concerned about his diet and constipation.  Had detailed counseling about future ways to prevent recurrence.    Time spent in direct patient care is more than 45min    -Data reviewed today: I reviewed all new labs and imaging reports over the last 24 hours. I personally reviewed no images or EKG's today.    Physical Exam   Temp: 97.8  F (36.6  C) Temp src: Oral BP: 125/72 Pulse: 74   Resp: 20 SpO2: 96 % O2 Device: None (Room air) Oxygen Delivery: 2 LPM  Vitals:    05/11/24 1233 05/11/24 1629   Weight: 77.9 kg (171 lb 11.8 oz) 77.9 kg (171 lb 11.2 oz)     Vital Signs with Ranges  Temp:  [97.8  F (36.6  C)-99.6  F (37.6  C)] 97.8  F (36.6  C)  Pulse:  [72-84] 74  Resp:  [20-24] 20  BP: ()/(54-80) 125/72  SpO2:  [94 %-98  %] 96 %  I/O last 3 completed shifts:  In: 240 [P.O.:240]  Out: -       GEN:  Alert, oriented x 3, appears comfortable, NAD.  HEENT:  Normocephalic/atraumatic, no scleral icterus, no nasal discharge, mouth moist.  CV:  Regular rate and rhythm, no murmur or JVD.  S1 + S2 noted, no S3 or S4.  LUNGS:  Clear to auscultation bilaterally without rales/rhonchi/wheezing/retractions.  Symmetric chest rise on inhalation noted.  ABD:  Active bowel sounds, soft, right lower quadrant tenderness.  No rebound/guarding/rigidity.  EXT:  No edema.  No cyanosis.  No joint synovitis noted.  SKIN:  Dry to touch, no exanthems noted in the visualized areas.    Medications   Current Facility-Administered Medications   Medication Dose Route Frequency Provider Last Rate Last Admin    Patient is already receiving mechanical prophylaxis   Does not apply Continuous PRN Vishnu Chadwick MD         Current Facility-Administered Medications   Medication Dose Route Frequency Provider Last Rate Last Admin    piperacillin-tazobactam (ZOSYN) 3.375 g vial to attach to  mL bag  3.375 g Intravenous Q6H Vishnu Chadwick MD   3.375 g at 05/12/24 1012    sodium chloride (PF) 0.9% PF flush 3 mL  3 mL Intracatheter Q8H Vishnu Chadwick MD   3 mL at 05/12/24 1012    tamsulosin (FLOMAX) capsule 0.4 mg  0.4 mg Oral At Bedtime Vishnu Chadwick MD   0.4 mg at 05/11/24 2213     Data     Recent Labs   Lab 05/12/24  0555 05/11/24  1246   WBC 7.9 13.4*   HGB 13.5 15.2   HCT 40.3 45.6   MCV 91 91    289     Recent Labs   Lab 05/12/24  0555 05/11/24  1246    135   POTASSIUM 4.3 3.8   CHLORIDE 103 98   CO2 26 24   ANIONGAP 9 13   * 87   BUN 9.5 10.3   CR 1.00 0.95   GFRESTIMATED 86 >90   RASHI 8.5* 9.3     7-Day Micro Results       Collected Updated Procedure Result Status      05/11/2024 1514 05/12/2024 0805 Blood Culture Arm, Left [[id number]]   Blood from Arm, Left    Preliminary result Component Value   Culture No growth after 12  "hours  [P]                05/11/2024 1344 05/12/2024 0346 Blood Culture Peripheral Blood [[id number]]   Peripheral Blood    Preliminary result Component Value   Culture No growth after 12 hours  [P]                      Recent Labs   Lab 05/11/24  1246   AST 18   ALT 18   ALKPHOS 107   BILITOTAL 0.9     No results for input(s): \"INR\" in the last 168 hours.  Recent Labs   Lab 05/11/24  1344   LACT 1.0     Recent Labs   Lab 05/11/24  1246   LIPASE 15       Recent Results (from the past 24 hour(s))   Head CT w/o contrast    Narrative    EXAM: CT HEAD W/O CONTRAST  LOCATION: [facility]  DATE: 5/11/2024    INDICATION: headache, finger numbness  COMPARISON: None  TECHNIQUE: Routine CT Head without IV contrast. Multiplanar reformats. Dose reduction techniques were used.    FINDINGS:  INTRACRANIAL CONTENTS: No intracranial hemorrhage, extraaxial collection, or mass effect.  No CT evidence of acute infarct. Normal parenchymal attenuation. Normal ventricles and sulci.     VISUALIZED ORBITS/SINUSES/MASTOIDS: No intraorbital abnormality. No paranasal sinus mucosal disease. No middle ear or mastoid effusion.    BONES/SOFT TISSUES: No acute abnormality.       Impression    IMPRESSION:  1.  No acute intracranial process.   CT Aortic Survey w Contrast    Narrative    EXAM: CT AORTIC SURVEY WITH CONTRAST  LOCATION: [facility]  DATE: 05/11/2024    INDICATION: Abdomen pain. ?Upper extremity symptoms.  COMPARISON: CT protocol performed 05/25/2021.  TECHNIQUE: CT angiogram chest abdomen pelvis during arterial phase of injection of IV contrast. 2D and 3D MIP reconstructions were performed by the CT technologist. Dose reduction techniques were used.   CONTRAST: 72 mL Isovue 370.    FINDINGS:   CT ANGIOGRAM CHEST, ABDOMEN, AND PELVIS: No mural hematoma. Mildly tortuous atherosclerotic aorta. No aortic dissection or aneurysm. Patent celiac, superior mesenteric, renal, and inferior mesenteric " arteries. Patent iliac arteries. No central pulmonary   embolus.    LUNGS AND PLEURA: Mild centrilobular emphysematous change. Scattered calcified granulomata. No pneumothorax or pleural effusion.    MEDIASTINUM/AXILLAE: Normal size heart. No pericardial effusion. No hilar or mediastinal lymphadenopathy. Calcified mediastinal and hilar lymph nodes.    CORONARY ARTERY CALCIFICATION: None.    HEPATOBILIARY: Status post cholecystectomy.    PANCREAS: Normal.    SPLEEN: Normal.    ADRENAL GLANDS: Normal.    KIDNEYS/BLADDER: Normal.    BOWEL: Short segment mural thickening with an inflamed diverticulum of the sigmoid colon and adjacent inflammatory change, highly concerning for diverticulitis. No organized fluid collection. No free air. Normal appendix. No obstruction.    LYMPH NODES: Normal.    PELVIC ORGANS: Normal.    MUSCULOSKELETAL: Mild multilevel discogenic degenerative change.      Impression    IMPRESSION:  1.  No aortic dissection, aneurysm, or pulmonary embolus.  2.  Sigmoid diverticulitis. No organized fluid collection.  3.  Atherosclerotic vascular disease.  4.  Mild emphysematous change.  5.  Ancient granulomatous disease/infection.

## 2024-05-12 NOTE — PLAN OF CARE
"End of Shift Summary  For vital signs and complete assessments, please see documentation flowsheets.     Pertinent assessments: Pt A&O x4. Up independently to the bathroom, showered this AM. Abdominal pain managed with PRN oxycodone and IV dilaudid. PIV patent, flushed, infused zosyn, saline locked. Tolerated diet with no change in pain level.     Major Shift Events: none    Treatment Plan: IV zosyn, pain management- dilaudid and oxycodone    Bedside Nurse: Sujey Trujillo RN     Problem: Adult Inpatient Plan of Care  Goal: Plan of Care Review  Description: The Plan of Care Review/Shift note should be completed every shift.  The Outcome Evaluation is a brief statement about your assessment that the patient is improving, declining, or no change.  This information will be displayed automatically on your shift  note.  Outcome: Progressing  Flowsheets (Taken 5/12/2024 9884)  Outcome Evaluation: Continues to need pain medications via IV.  Plan of Care Reviewed With:   patient   spouse  Overall Patient Progress: no change  Goal: Patient-Specific Goal (Individualized)  Description: You can add care plan individualizations to a care plan. Examples of Individualization might be:  \"Parent requests to be called daily at 9am for status\", \"I have a hard time hearing out of my right ear\", or \"Do not touch me to wake me up as it startles  me\".  Outcome: Progressing  Goal: Absence of Hospital-Acquired Illness or Injury  Outcome: Progressing  Intervention: Identify and Manage Fall Risk  Recent Flowsheet Documentation  Taken 5/12/2024 0759 by Sujey Trujillo RN  Safety Promotion/Fall Prevention: nonskid shoes/slippers when out of bed  Intervention: Prevent Skin Injury  Recent Flowsheet Documentation  Taken 5/12/2024 0759 by Sujey Trujillo, RN  Body Position: position changed independently  Intervention: Prevent and Manage VTE (Venous Thromboembolism) Risk  Recent Flowsheet Documentation  Taken 5/12/2024 0759 by Sujey Trujillo, " RN  VTE Prevention/Management: SCDs (sequential compression devices) off  Intervention: Prevent Infection  Recent Flowsheet Documentation  Taken 5/12/2024 0759 by Sujey Trujillo RN  Infection Prevention:   hand hygiene promoted   single patient room provided  Goal: Optimal Comfort and Wellbeing  Outcome: Progressing  Intervention: Monitor Pain and Promote Comfort  Recent Flowsheet Documentation  Taken 5/12/2024 0747 by Sujey Trujillo RN  Pain Management Interventions: medication (see MAR)  Goal: Readiness for Transition of Care  Outcome: Progressing     Problem: Pain Acute  Goal: Optimal Pain Control and Function  Outcome: Progressing  Intervention: Develop Pain Management Plan  Recent Flowsheet Documentation  Taken 5/12/2024 0747 by Sujey Trujillo RN  Pain Management Interventions: medication (see MAR)  Intervention: Prevent or Manage Pain  Recent Flowsheet Documentation  Taken 5/12/2024 0759 by Sujey Trujillo RN  Medication Review/Management: medications reviewed   Goal Outcome Evaluation:      Plan of Care Reviewed With: patient, spouse    Overall Patient Progress: no changeOverall Patient Progress: no change    Outcome Evaluation: Continues to need pain medications via IV.

## 2024-05-12 NOTE — PLAN OF CARE
"Goal Outcome Evaluation:    Pertinent assessments: Assumed care of pt from 2111-7136. Pt A&O x4. Up independently to the bathroom. Abdominal pain managed with PRN IV dilaudid 0.2mg x1 without effect and IV dilaudid 0.4mg x1. PIV patent, flushed, infused zosyn, saline locked. BP soft overnight (94/54).    Major Shift Events: none    Treatment Plan: IV zosyn, pain management    Bedside Nurse: Joanna Victor RN      Plan of Care Reviewed With: patient    Overall Patient Progress: improvingOverall Patient Progress: improving    Outcome Evaluation: Pt reports pain not as bad as yesterday; PRN IV dilaudid x2.      Problem: Adult Inpatient Plan of Care  Goal: Plan of Care Review  Description: The Plan of Care Review/Shift note should be completed every shift.  The Outcome Evaluation is a brief statement about your assessment that the patient is improving, declining, or no change.  This information will be displayed automatically on your shift  note.  Outcome: Progressing  Flowsheets (Taken 5/12/2024 0623)  Outcome Evaluation:   Pt reports pain not as bad as yesterday   PRN IV dilaudid x2.  Plan of Care Reviewed With: patient  Overall Patient Progress: improving  Goal: Patient-Specific Goal (Individualized)  Description: You can add care plan individualizations to a care plan. Examples of Individualization might be:  \"Parent requests to be called daily at 9am for status\", \"I have a hard time hearing out of my right ear\", or \"Do not touch me to wake me up as it startles  me\".  Outcome: Progressing  Goal: Absence of Hospital-Acquired Illness or Injury  Outcome: Progressing  Intervention: Identify and Manage Fall Risk  Recent Flowsheet Documentation  Taken 5/12/2024 0414 by Joanna Victor, RN  Safety Promotion/Fall Prevention: activity supervised  Taken 5/11/2024 2330 by Joanna Victor, RN  Safety Promotion/Fall Prevention:   activity supervised   assistive device/personal items within reach   clutter free environment " maintained   nonskid shoes/slippers when out of bed   safety round/check completed  Intervention: Prevent Skin Injury  Recent Flowsheet Documentation  Taken 5/12/2024 0414 by Joanna Victor RN  Body Position: position changed independently  Taken 5/11/2024 2330 by Joanna Victor RN  Body Position: position changed independently  Intervention: Prevent and Manage VTE (Venous Thromboembolism) Risk  Recent Flowsheet Documentation  Taken 5/12/2024 0414 by Joanna Victor RN  VTE Prevention/Management: SCDs (sequential compression devices) off  Taken 5/11/2024 2330 by Joanna Victro RN  VTE Prevention/Management: SCDs (sequential compression devices) off  Intervention: Prevent Infection  Recent Flowsheet Documentation  Taken 5/12/2024 0414 by Joanna Victor RN  Infection Prevention:   hand hygiene promoted   rest/sleep promoted   single patient room provided  Taken 5/11/2024 2330 by Joanna Victor RN  Infection Prevention:   hand hygiene promoted   rest/sleep promoted   single patient room provided  Goal: Optimal Comfort and Wellbeing  Outcome: Progressing  Intervention: Monitor Pain and Promote Comfort  Recent Flowsheet Documentation  Taken 5/12/2024 0617 by Joanna Victor RN  Pain Management Interventions: medication (see MAR)  Taken 5/12/2024 0414 by Joanna Victor RN  Pain Management Interventions: medication (see MAR)  Taken 5/11/2024 2330 by Joanna Victor RN  Pain Management Interventions: declines  Goal: Readiness for Transition of Care  Outcome: Progressing     Problem: Intestinal Obstruction  Goal: Optimal Bowel Function  Outcome: Progressing  Intervention: Promote Bowel Function  Recent Flowsheet Documentation  Taken 5/12/2024 0414 by Joanna Victor RN  Body Position: position changed independently  Head of Bed (HOB) Positioning: HOB at 20-30 degrees  Positioning/Transfer Devices:   pillows   in use  Taken 5/11/2024 2330 by Joanna Victor RN  Body Position: position changed independently  Head of Bed  ([facility]) Positioning: HOB at 20-30 degrees  Positioning/Transfer Devices:   pillows   in use  Goal: Fluid and Electrolyte Balance  Outcome: Progressing  Goal: Absence of Infection Signs and Symptoms  Outcome: Progressing  Goal: Optimize Nutrition Status  Outcome: Progressing  Goal: Optimal Pain Control and Function  Outcome: Progressing  Intervention: Prevent or Manage Pain  Recent Flowsheet Documentation  Taken 5/12/2024 0617 by Joanna Victor RN  Pain Management Interventions: medication (see MAR)  Taken 5/12/2024 0414 by Joanna Victor RN  Pain Management Interventions: medication (see MAR)  Taken 5/11/2024 2330 by Joanna Victor RN  Pain Management Interventions: declines  Sleep/Rest Enhancement: room darkened     Problem: Pain Acute  Goal: Optimal Pain Control and Function  Outcome: Progressing  Intervention: Develop Pain Management Plan  Recent Flowsheet Documentation  Taken 5/12/2024 0617 by Joanna Victor RN  Pain Management Interventions: medication (see MAR)  Taken 5/12/2024 0414 by Joanna Victor RN  Pain Management Interventions: medication (see MAR)  Taken 5/11/2024 2330 by Joanna Victor RN  Pain Management Interventions: declines  Intervention: Prevent or Manage Pain  Recent Flowsheet Documentation  Taken 5/12/2024 0414 by Joanna Victor RN  Medication Review/Management: medications reviewed  Taken 5/11/2024 2330 by Joanna Victor RN  Sleep/Rest Enhancement: room darkened  Medication Review/Management: medications reviewed

## 2024-05-13 LAB
ANION GAP SERPL CALCULATED.3IONS-SCNC: 11 MMOL/L (ref 7–15)
ATRIAL RATE - MUSE: 85 BPM
BUN SERPL-MCNC: 6.4 MG/DL (ref 8–23)
CALCIUM SERPL-MCNC: 9.2 MG/DL (ref 8.8–10.2)
CHLORIDE SERPL-SCNC: 103 MMOL/L (ref 98–107)
CREAT SERPL-MCNC: 0.85 MG/DL (ref 0.67–1.17)
DEPRECATED HCO3 PLAS-SCNC: 25 MMOL/L (ref 22–29)
DIASTOLIC BLOOD PRESSURE - MUSE: NORMAL MMHG
EGFRCR SERPLBLD CKD-EPI 2021: >90 ML/MIN/1.73M2
ERYTHROCYTE [DISTWIDTH] IN BLOOD BY AUTOMATED COUNT: 12.7 % (ref 10–15)
GLUCOSE SERPL-MCNC: 99 MG/DL (ref 70–99)
HCT VFR BLD AUTO: 42.8 % (ref 40–53)
HGB BLD-MCNC: 14.3 G/DL (ref 13.3–17.7)
INTERPRETATION ECG - MUSE: NORMAL
MAGNESIUM SERPL-MCNC: 2 MG/DL (ref 1.7–2.3)
MCH RBC QN AUTO: 30.4 PG (ref 26.5–33)
MCHC RBC AUTO-ENTMCNC: 33.4 G/DL (ref 31.5–36.5)
MCV RBC AUTO: 91 FL (ref 78–100)
P AXIS - MUSE: 61 DEGREES
PLATELET # BLD AUTO: 275 10E3/UL (ref 150–450)
POTASSIUM SERPL-SCNC: 4.5 MMOL/L (ref 3.4–5.3)
PR INTERVAL - MUSE: 200 MS
QRS DURATION - MUSE: 100 MS
QT - MUSE: 366 MS
QTC - MUSE: 435 MS
R AXIS - MUSE: 30 DEGREES
RBC # BLD AUTO: 4.71 10E6/UL (ref 4.4–5.9)
SODIUM SERPL-SCNC: 139 MMOL/L (ref 135–145)
SYSTOLIC BLOOD PRESSURE - MUSE: NORMAL MMHG
T AXIS - MUSE: 43 DEGREES
VENTRICULAR RATE- MUSE: 85 BPM
WBC # BLD AUTO: 7.1 10E3/UL (ref 4–11)

## 2024-05-13 PROCEDURE — 250N000013 HC RX MED GY IP 250 OP 250 PS 637: Performed by: INTERNAL MEDICINE

## 2024-05-13 PROCEDURE — 36415 COLL VENOUS BLD VENIPUNCTURE: CPT | Performed by: INTERNAL MEDICINE

## 2024-05-13 PROCEDURE — 120N000001 HC R&B MED SURG/OB

## 2024-05-13 PROCEDURE — 99232 SBSQ HOSP IP/OBS MODERATE 35: CPT | Performed by: INTERNAL MEDICINE

## 2024-05-13 PROCEDURE — 83735 ASSAY OF MAGNESIUM: CPT | Performed by: INTERNAL MEDICINE

## 2024-05-13 PROCEDURE — 85027 COMPLETE CBC AUTOMATED: CPT | Performed by: INTERNAL MEDICINE

## 2024-05-13 PROCEDURE — 250N000011 HC RX IP 250 OP 636: Performed by: INTERNAL MEDICINE

## 2024-05-13 PROCEDURE — 80048 BASIC METABOLIC PNL TOTAL CA: CPT | Performed by: INTERNAL MEDICINE

## 2024-05-13 RX ORDER — ALBUTEROL SULFATE 90 UG/1
2 AEROSOL, METERED RESPIRATORY (INHALATION) EVERY 4 HOURS PRN
Status: SHIPPED
Start: 2024-05-13

## 2024-05-13 RX ORDER — LACTULOSE 10 G/15ML
20 SOLUTION ORAL ONCE
Status: COMPLETED | OUTPATIENT
Start: 2024-05-13 | End: 2024-05-13

## 2024-05-13 RX ORDER — HYDROMORPHONE HYDROCHLORIDE 2 MG/1
2 TABLET ORAL EVERY 4 HOURS PRN
Status: DISCONTINUED | OUTPATIENT
Start: 2024-05-13 | End: 2024-05-18 | Stop reason: HOSPADM

## 2024-05-13 RX ADMIN — HYDROMORPHONE HYDROCHLORIDE 0.4 MG: 0.2 INJECTION, SOLUTION INTRAMUSCULAR; INTRAVENOUS; SUBCUTANEOUS at 21:16

## 2024-05-13 RX ADMIN — LACTULOSE 20 G: 20 SOLUTION ORAL at 10:19

## 2024-05-13 RX ADMIN — PIPERACILLIN AND TAZOBACTAM 3.38 G: 3; .375 INJECTION, POWDER, FOR SOLUTION INTRAVENOUS at 15:24

## 2024-05-13 RX ADMIN — PIPERACILLIN AND TAZOBACTAM 3.38 G: 3; .375 INJECTION, POWDER, FOR SOLUTION INTRAVENOUS at 21:12

## 2024-05-13 RX ADMIN — HYDROMORPHONE HYDROCHLORIDE 0.4 MG: 0.2 INJECTION, SOLUTION INTRAMUSCULAR; INTRAVENOUS; SUBCUTANEOUS at 18:48

## 2024-05-13 RX ADMIN — PIPERACILLIN AND TAZOBACTAM 3.38 G: 3; .375 INJECTION, POWDER, FOR SOLUTION INTRAVENOUS at 10:19

## 2024-05-13 RX ADMIN — PIPERACILLIN AND TAZOBACTAM 3.38 G: 3; .375 INJECTION, POWDER, FOR SOLUTION INTRAVENOUS at 03:59

## 2024-05-13 RX ADMIN — HYDROMORPHONE HYDROCHLORIDE 0.4 MG: 0.2 INJECTION, SOLUTION INTRAMUSCULAR; INTRAVENOUS; SUBCUTANEOUS at 15:19

## 2024-05-13 RX ADMIN — HYDROMORPHONE HYDROCHLORIDE 0.4 MG: 0.2 INJECTION, SOLUTION INTRAMUSCULAR; INTRAVENOUS; SUBCUTANEOUS at 10:19

## 2024-05-13 RX ADMIN — OXYCODONE HYDROCHLORIDE 5 MG: 5 TABLET ORAL at 09:25

## 2024-05-13 RX ADMIN — HYDROMORPHONE HYDROCHLORIDE 0.4 MG: 0.2 INJECTION, SOLUTION INTRAMUSCULAR; INTRAVENOUS; SUBCUTANEOUS at 06:59

## 2024-05-13 RX ADMIN — TAMSULOSIN HYDROCHLORIDE 0.4 MG: 0.4 CAPSULE ORAL at 21:14

## 2024-05-13 ASSESSMENT — ACTIVITIES OF DAILY LIVING (ADL)
ADLS_ACUITY_SCORE: 22

## 2024-05-13 NOTE — PLAN OF CARE
"Alert and oriented x 4. On RA. Independent in room. Voiding adequately, c/o constipation and abdominal discomfort. PRN IV Dilaudid given x2. Abx administered per order. PIV removed.     Problem: Adult Inpatient Plan of Care  Goal: Plan of Care Review  Description: The Plan of Care Review/Shift note should be completed every shift.  The Outcome Evaluation is a brief statement about your assessment that the patient is improving, declining, or no change.  This information will be displayed automatically on your shift  note.  Outcome: Progressing  Flowsheets (Taken 5/13/2024 0759)  Outcome Evaluation: On RA, reports pain, PRN IV Dilaudid given x 2. Pt educated regarding narcotics side effects. C/o constipation, PRN Senna given.  Plan of Care Reviewed With: patient  Overall Patient Progress: no change  Goal: Patient-Specific Goal (Individualized)  Description: You can add care plan individualizations to a care plan. Examples of Individualization might be:  \"Parent requests to be called daily at 9am for status\", \"I have a hard time hearing out of my right ear\", or \"Do not touch me to wake me up as it startles  me\".  Outcome: Progressing  Goal: Absence of Hospital-Acquired Illness or Injury  Outcome: Progressing  Intervention: Identify and Manage Fall Risk  Recent Flowsheet Documentation  Taken 5/12/2024 2028 by Tanya Webber RN  Safety Promotion/Fall Prevention: activity supervised  Intervention: Prevent Skin Injury  Recent Flowsheet Documentation  Taken 5/12/2024 2028 by Tanya Webber RN  Body Position: position changed independently  Device Skin Pressure Protection: tubing/devices free from skin contact  Intervention: Prevent and Manage VTE (Venous Thromboembolism) Risk  Recent Flowsheet Documentation  Taken 5/12/2024 2028 by Tanya Webber RN  VTE Prevention/Management: SCDs (sequential compression devices) off  Intervention: Prevent Infection  Recent Flowsheet Documentation  Taken 5/12/2024 " 2028 by Tanya Webber, RN  Infection Prevention:   hand hygiene promoted   personal protective equipment utilized  Goal: Optimal Comfort and Wellbeing  Outcome: Progressing  Intervention: Monitor Pain and Promote Comfort  Recent Flowsheet Documentation  Taken 5/12/2024 2054 by Tanya Webber, RN  Pain Management Interventions: medication (see MAR)  Goal: Readiness for Transition of Care  Outcome: Progressing     Problem: Pain Acute  Goal: Optimal Pain Control and Function  Outcome: Progressing  Intervention: Develop Pain Management Plan  Recent Flowsheet Documentation  Taken 5/12/2024 2054 by Tanya Webber, RN  Pain Management Interventions: medication (see MAR)  Intervention: Prevent or Manage Pain  Recent Flowsheet Documentation  Taken 5/12/2024 2028 by Tanya Webber, RN  Medication Review/Management: medications reviewed     Problem: Infection  Goal: Absence of Infection Signs and Symptoms  Outcome: Progressing   Goal Outcome Evaluation:      Plan of Care Reviewed With: patient    Overall Patient Progress: no changeOverall Patient Progress: no change    Outcome Evaluation: On RA, reports pain, PRN IV Dilaudid given x 2. Pt educated regarding narcotics side effects. C/o constipation, PRN Senna given.

## 2024-05-13 NOTE — PLAN OF CARE
A&Ox4. Up independent in room. IV Dilaudid given for pain. New IV this shift. Continuing IV Zosyn. Poor PO intake. Reports constipation. Lactulose ordered by provider and given, no results this shift. Discharge pending.      Problem: Adult Inpatient Plan of Care  Goal: Plan of Care Review  Description: The Plan of Care Review/Shift note should be completed every shift.  The Outcome Evaluation is a brief statement about your assessment that the patient is improving, declining, or no change.  This information will be displayed automatically on your shift  note.  5/13/2024 1823 by Aileen Navarrete, RN  Outcome: Not Progressing  Flowsheets (Taken 5/13/2024 1823)  Outcome Evaluation: IV Dilaudid for pain. Poor PO intake. New IV this shift. Continuing IV Zosyn. Reports constipation, Lactulose ordered and given.  Plan of Care Reviewed With: patient  Overall Patient Progress: no change  5/13/2024 1822 by Aileen Navarrete, RN  Outcome: Not Progressing

## 2024-05-13 NOTE — PROGRESS NOTES
[facility]    Hospitalist Progress Note  Name: Pawel Vallejo    MRN: [id number]  Provider: Radha Ryan MD  Date of Service: 05/13/2024    Assessment & Plan   Summary of Stay: Pawel Vallejo is a 58 year old male who was admitted on 5/11/2024 for sigmoid diverticulitis.  Patient's past medical history significant for BPH, chronic back pain, GERD, constipation presented with right lower quadrant abdominal pain.  Valuation the emergency room showed sigmoid diverticulitis.    Acute sigmoid diverticulitis  -Continues to require pain meds.  Pain worse than yesterday  -Tolerating p.o.  -Continue as needed pain meds  -Continue IV  antibiotics    Constipation  -Will try one-time dose of lactulose       Imaging in ED showed/incidental findings  Atherosclerotic vascular disease  Emphysematous change and granulomatous change involving lungs  -Will defer to PCP on discharge further workup and restratification    DVT Prophylaxis: Pneumatic Compression Devices  Code Status: Full Code    Disposition: Expected discharge likely tomorrow if continues to improve      Interval History   Reviewed chart.  Pain is somewhat better still has about 5 out of 10 pain in right lower quadrant . no nausea no vomiting tolerating p.o. more than 10 point review of system was carried out was otherwise negative  Time spent in direct patient care is more than 35 min    -Data reviewed today: I reviewed all new labs and imaging reports over the last 24 hours. I personally reviewed no images or EKG's today.    Physical Exam   Temp: 98  F (36.7  C) Temp src: Oral BP: 135/79 Pulse: 66   Resp: 16 SpO2: 94 % O2 Device: None (Room air)    Vitals:    05/11/24 1233 05/11/24 1629   Weight: 77.9 kg (171 lb 11.8 oz) 77.9 kg (171 lb 11.2 oz)     Vital Signs with Ranges  Temp:  [97.8  F (36.6  C)-98  F (36.7  C)] 98  F (36.7  C)  Pulse:  [62-72] 66  Resp:  [16-20] 16  BP: (133-135)/(72-79) 135/79  SpO2:  [92 %-97 %] 94 %  I/O last 3  completed shifts:  In: 500 [P.O.:500]  Out: -       GEN:  Alert, oriented x 3, appears comfortable, NAD.  HEENT:  Normocephalic/atraumatic, no scleral icterus, no nasal discharge, mouth moist.  CV:  Regular rate and rhythm, no murmur or JVD.  S1 + S2 noted, no S3 or S4.  LUNGS:  Clear to auscultation bilaterally without rales/rhonchi/wheezing/retractions.  Symmetric chest rise on inhalation noted.  ABD:  Active bowel sounds, soft, right lower quadrant tenderness.  No rebound/guarding/rigidity.  EXT:  No edema.  No cyanosis.  No joint synovitis noted.  SKIN:  Dry to touch, no exanthems noted in the visualized areas.    Medications   Current Facility-Administered Medications   Medication Dose Route Frequency Provider Last Rate Last Admin    Patient is already receiving mechanical prophylaxis   Does not apply Continuous PRN Vishnu Chadwick MD         Current Facility-Administered Medications   Medication Dose Route Frequency Provider Last Rate Last Admin    lactulose (CHRONULAC) solution 20 g  20 g Oral Once Radha Ryan MD        piperacillin-tazobactam (ZOSYN) 3.375 g vial to attach to  mL bag  3.375 g Intravenous Q6H Vishnu Chadwick MD   3.375 g at 05/13/24 0359    sodium chloride (PF) 0.9% PF flush 3 mL  3 mL Intracatheter Q8H Vishnu Chadwick MD   3 mL at 05/12/24 1602    tamsulosin (FLOMAX) capsule 0.4 mg  0.4 mg Oral At Bedtime Vishnu Chadwick MD   0.4 mg at 05/12/24 2154     Data     Recent Labs   Lab 05/13/24  0756 05/12/24  0555 05/11/24  1246   WBC 7.1 7.9 13.4*   HGB 14.3 13.5 15.2   HCT 42.8 40.3 45.6   MCV 91 91 91    241 289     Recent Labs   Lab 05/13/24  0756 05/12/24  0555 05/11/24  1246    138 135   POTASSIUM 4.5 4.3 3.8   CHLORIDE 103 103 98   CO2 25 26 24   ANIONGAP 11 9 13   GLC 99 102* 87   BUN 6.4* 9.5 10.3   CR 0.85 1.00 0.95   GFRESTIMATED >90 86 >90   RASHI 9.2 8.5* 9.3     7-Day Micro Results       Collected Updated Procedure Result Status      05/11/2024  "1514 05/12/2024 2007 Blood Culture Arm, Left [[id number]]   Blood from Arm, Left    Preliminary result Component Value   Culture No growth after 1 day  [P]                05/11/2024 1344 05/12/2024 1547 Blood Culture Peripheral Blood [[id number]]   Peripheral Blood    Preliminary result Component Value   Culture No growth after 1 day  [P]                      Recent Labs   Lab 05/11/24  1246   AST 18   ALT 18   ALKPHOS 107   BILITOTAL 0.9     No results for input(s): \"INR\" in the last 168 hours.  Recent Labs   Lab 05/11/24  1344   LACT 1.0     Recent Labs   Lab 05/11/24  1246   LIPASE 15       No results found for this or any previous visit (from the past 24 hour(s)).         "

## 2024-05-13 NOTE — PLAN OF CARE
A&Ox4. Up independent in room. C/o of pain, IV dilaudid given x2. Poor PO appetite. Continuing IV Zosyn. New IV placed today. Constipated, lactulose given, no results yet. Wife at bedside this morning, updated on POC. Discharge pending.

## 2024-05-14 ENCOUNTER — APPOINTMENT (OUTPATIENT)
Dept: CT IMAGING | Facility: CLINIC | Age: 59
DRG: 392 | End: 2024-05-14
Attending: INTERNAL MEDICINE
Payer: COMMERCIAL

## 2024-05-14 LAB
ANION GAP SERPL CALCULATED.3IONS-SCNC: 9 MMOL/L (ref 7–15)
BUN SERPL-MCNC: 5.6 MG/DL (ref 8–23)
CALCIUM SERPL-MCNC: 9.3 MG/DL (ref 8.8–10.2)
CHLORIDE SERPL-SCNC: 104 MMOL/L (ref 98–107)
CREAT SERPL-MCNC: 0.89 MG/DL (ref 0.67–1.17)
DEPRECATED HCO3 PLAS-SCNC: 25 MMOL/L (ref 22–29)
EGFRCR SERPLBLD CKD-EPI 2021: >90 ML/MIN/1.73M2
ERYTHROCYTE [DISTWIDTH] IN BLOOD BY AUTOMATED COUNT: 12.7 % (ref 10–15)
GLUCOSE SERPL-MCNC: 101 MG/DL (ref 70–99)
HCT VFR BLD AUTO: 41.6 % (ref 40–53)
HGB BLD-MCNC: 14 G/DL (ref 13.3–17.7)
MAGNESIUM SERPL-MCNC: 2.1 MG/DL (ref 1.7–2.3)
MCH RBC QN AUTO: 30.4 PG (ref 26.5–33)
MCHC RBC AUTO-ENTMCNC: 33.7 G/DL (ref 31.5–36.5)
MCV RBC AUTO: 90 FL (ref 78–100)
PLATELET # BLD AUTO: 280 10E3/UL (ref 150–450)
POTASSIUM SERPL-SCNC: 4.4 MMOL/L (ref 3.4–5.3)
RBC # BLD AUTO: 4.6 10E6/UL (ref 4.4–5.9)
SODIUM SERPL-SCNC: 138 MMOL/L (ref 135–145)
WBC # BLD AUTO: 7.3 10E3/UL (ref 4–11)

## 2024-05-14 PROCEDURE — 258N000003 HC RX IP 258 OP 636: Performed by: INTERNAL MEDICINE

## 2024-05-14 PROCEDURE — 250N000011 HC RX IP 250 OP 636: Performed by: INTERNAL MEDICINE

## 2024-05-14 PROCEDURE — 85027 COMPLETE CBC AUTOMATED: CPT | Performed by: INTERNAL MEDICINE

## 2024-05-14 PROCEDURE — 99233 SBSQ HOSP IP/OBS HIGH 50: CPT | Performed by: INTERNAL MEDICINE

## 2024-05-14 PROCEDURE — 36415 COLL VENOUS BLD VENIPUNCTURE: CPT | Performed by: INTERNAL MEDICINE

## 2024-05-14 PROCEDURE — 250N000013 HC RX MED GY IP 250 OP 250 PS 637: Performed by: INTERNAL MEDICINE

## 2024-05-14 PROCEDURE — 120N000001 HC R&B MED SURG/OB

## 2024-05-14 PROCEDURE — 82374 ASSAY BLOOD CARBON DIOXIDE: CPT | Performed by: INTERNAL MEDICINE

## 2024-05-14 PROCEDURE — 250N000013 HC RX MED GY IP 250 OP 250 PS 637: Performed by: PHYSICIAN ASSISTANT

## 2024-05-14 PROCEDURE — 250N000009 HC RX 250: Performed by: INTERNAL MEDICINE

## 2024-05-14 PROCEDURE — 74177 CT ABD & PELVIS W/CONTRAST: CPT

## 2024-05-14 PROCEDURE — 83735 ASSAY OF MAGNESIUM: CPT | Performed by: INTERNAL MEDICINE

## 2024-05-14 RX ORDER — HYDROCORTISONE 2.5 %
CREAM (GRAM) TOPICAL 2 TIMES DAILY
Status: DISCONTINUED | OUTPATIENT
Start: 2024-05-14 | End: 2024-05-18 | Stop reason: HOSPADM

## 2024-05-14 RX ORDER — IOPAMIDOL 755 MG/ML
500 INJECTION, SOLUTION INTRAVASCULAR ONCE
Status: COMPLETED | OUTPATIENT
Start: 2024-05-14 | End: 2024-05-14

## 2024-05-14 RX ORDER — SODIUM CHLORIDE 9 MG/ML
INJECTION, SOLUTION INTRAVENOUS CONTINUOUS
Status: DISCONTINUED | OUTPATIENT
Start: 2024-05-14 | End: 2024-05-17

## 2024-05-14 RX ADMIN — IOPAMIDOL 86 ML: 755 INJECTION, SOLUTION INTRAVENOUS at 09:13

## 2024-05-14 RX ADMIN — PIPERACILLIN AND TAZOBACTAM 3.38 G: 3; .375 INJECTION, POWDER, FOR SOLUTION INTRAVENOUS at 03:51

## 2024-05-14 RX ADMIN — SODIUM CHLORIDE 62 ML: 9 INJECTION, SOLUTION INTRAVENOUS at 09:13

## 2024-05-14 RX ADMIN — HYDROMORPHONE HYDROCHLORIDE 0.4 MG: 0.2 INJECTION, SOLUTION INTRAMUSCULAR; INTRAVENOUS; SUBCUTANEOUS at 12:51

## 2024-05-14 RX ADMIN — HYDROMORPHONE HYDROCHLORIDE 0.4 MG: 0.2 INJECTION, SOLUTION INTRAMUSCULAR; INTRAVENOUS; SUBCUTANEOUS at 17:59

## 2024-05-14 RX ADMIN — HYDROMORPHONE HYDROCHLORIDE 0.4 MG: 0.2 INJECTION, SOLUTION INTRAMUSCULAR; INTRAVENOUS; SUBCUTANEOUS at 21:15

## 2024-05-14 RX ADMIN — PIPERACILLIN AND TAZOBACTAM 3.38 G: 3; .375 INJECTION, POWDER, FOR SOLUTION INTRAVENOUS at 09:38

## 2024-05-14 RX ADMIN — ONDANSETRON 4 MG: 4 TABLET, ORALLY DISINTEGRATING ORAL at 07:45

## 2024-05-14 RX ADMIN — GLYCERIN, PETROLATUM, PHENYLEPHRINE HCL, PRAMOXINE HCL: 144; 2.5; 10; 15 CREAM TOPICAL at 08:41

## 2024-05-14 RX ADMIN — HYDROCORTISONE: 25 CREAM TOPICAL at 21:13

## 2024-05-14 RX ADMIN — PIPERACILLIN AND TAZOBACTAM 3.38 G: 3; .375 INJECTION, POWDER, FOR SOLUTION INTRAVENOUS at 21:11

## 2024-05-14 RX ADMIN — SODIUM CHLORIDE: 9 INJECTION, SOLUTION INTRAVENOUS at 21:18

## 2024-05-14 RX ADMIN — PIPERACILLIN AND TAZOBACTAM 3.38 G: 3; .375 INJECTION, POWDER, FOR SOLUTION INTRAVENOUS at 16:10

## 2024-05-14 RX ADMIN — HYDROMORPHONE HYDROCHLORIDE 0.4 MG: 0.2 INJECTION, SOLUTION INTRAMUSCULAR; INTRAVENOUS; SUBCUTANEOUS at 08:41

## 2024-05-14 RX ADMIN — TAMSULOSIN HYDROCHLORIDE 0.4 MG: 0.4 CAPSULE ORAL at 21:12

## 2024-05-14 RX ADMIN — HYDROMORPHONE HYDROCHLORIDE 0.4 MG: 0.2 INJECTION, SOLUTION INTRAMUSCULAR; INTRAVENOUS; SUBCUTANEOUS at 16:10

## 2024-05-14 ASSESSMENT — ACTIVITIES OF DAILY LIVING (ADL)
ADLS_ACUITY_SCORE: 22

## 2024-05-14 NOTE — PLAN OF CARE
Pertinent assessments: A&Ox4. Up ind. VSS. RA. Using IV dilaudid for abd pain. Denies nausea. Passing gas & stool. Complaints of large external hemorrhoids. Using preparation H.     Major Shift Events: Uneventful  Treatment Plan: IV antibiotics, pain control.   Bedside Nurse: Jing Delcid RN                           Problem: Adult Inpatient Plan of Care  Goal: Plan of Care Review  Description: The Plan of Care Review/Shift note should be completed every shift.  The Outcome Evaluation is a brief statement about your assessment that the patient is improving, declining, or no change.  This information will be displayed automatically on your shift  note.  Recent Flowsheet Documentation  Taken 5/14/2024 0547 by Jing eDlcid RN  Plan of Care Reviewed With: patient  Overall Patient Progress: improving  Goal: Absence of Hospital-Acquired Illness or Injury  Intervention: Prevent Skin Injury  Recent Flowsheet Documentation  Taken 5/13/2024 2130 by Jing Delcid RN  Body Position: position changed independently  Intervention: Prevent and Manage VTE (Venous Thromboembolism) Risk  Recent Flowsheet Documentation  Taken 5/13/2024 2130 by Jing Delcid RN  VTE Prevention/Management: SCDs (sequential compression devices) off   Goal Outcome Evaluation:      Plan of Care Reviewed With: patient    Overall Patient Progress: improvingOverall Patient Progress: improving

## 2024-05-14 NOTE — PROGRESS NOTES
[facility]    Hospitalist Progress Note  Name: Pawel Vallejo    MRN: [id number]  Provider: Radha Ryan MD  Date of Service: 05/14/2024    Assessment & Plan   Summary of Stay: Pawel Vallejo is a 58 year old male who was admitted on 5/11/2024 for sigmoid diverticulitis.  Patient's past medical history significant for BPH, chronic back pain, GERD, constipation presented with right lower quadrant abdominal pain.  Valuation the emergency room showed sigmoid diverticulitis.    Acute sigmoid diverticulitis  Continues to have worsening abdominal pain  -Require frequent pain meds  -Increasing tenderness  -Continues to require pain meds.  Pain worse than yesterday  -Will make him n.p.o. recheck CT abdomen pelvis  -Continue as needed pain meds  -Continue IV  antibiotics  -Consult colorectal surgery    Constipation  -Resolved    Painful hemorrhoids  -Consulted colorectal surgery      Imaging in ED showed/incidental findings  Atherosclerotic vascular disease  Emphysematous change and granulomatous change involving lungs  -Will defer to PCP on discharge further workup and restratification    DVT Prophylaxis: Pneumatic Compression Devices  Code Status: Full Code    Disposition: Expected discharge to be decided with worsening symptoms today      Interval History   Reviewed chart.  Patient complains of increasingly worse abdominal pain and hemorrhoidal discomfort.  Feels restless has been requiring a lot of pain meds pain is about 8 out of 10 in intensity.  No nausea no vomiting is able to tolerate p.o.  No fever or chills.  More than 10 point review of system was carried out was otherwise negative.  Care plan discussed with patient and wife at bedside.    Total time spent direct patient care coordination of care is 55 minutes    -Data reviewed today: I reviewed all new labs and imaging reports over the last 24 hours. I personally reviewed no images or EKG's today.    Physical Exam   Temp: 97.7  F (36.5   C) Temp src: Oral BP: (!) 145/81 Pulse: 71   Resp: 20 SpO2: 99 % O2 Device: None (Room air)    Vitals:    05/11/24 1233 05/11/24 1629   Weight: 77.9 kg (171 lb 11.8 oz) 77.9 kg (171 lb 11.2 oz)     Vital Signs with Ranges  Temp:  [97.7  F (36.5  C)-98.3  F (36.8  C)] 97.7  F (36.5  C)  Pulse:  [58-79] 71  Resp:  [16-20] 20  BP: (119-147)/(68-85) 145/81  SpO2:  [96 %-99 %] 99 %  No intake/output data recorded.      GEN:  Alert, oriented x 3, appears comfortable, NAD.  HEENT:  Normocephalic/atraumatic, no scleral icterus, no nasal discharge, mouth moist.  CV:  Regular rate and rhythm, no murmur or JVD.  S1 + S2 noted, no S3 or S4.  LUNGS:  Clear to auscultation bilaterally without rales/rhonchi/wheezing/retractions.  Symmetric chest rise on inhalation noted.  ABD:  Active bowel sounds, soft, right lower quadrant tenderness + mild guarding.  No rebound  EXT:  No edema.  No cyanosis.  No joint synovitis noted.  SKIN:  Dry to touch, no exanthems noted in the visualized areas.    Medications   Current Facility-Administered Medications   Medication Dose Route Frequency Provider Last Rate Last Admin    Patient is already receiving mechanical prophylaxis   Does not apply Continuous PRN Vishnu Chadwick MD         Current Facility-Administered Medications   Medication Dose Route Frequency Provider Last Rate Last Admin    piperacillin-tazobactam (ZOSYN) 3.375 g vial to attach to  mL bag  3.375 g Intravenous Q6H Vishnu Chadwick MD   3.375 g at 05/14/24 0351    pramox-pe-glycerin-petrolatum (PREPARATION H) cream   Rectal TID Radha Ryan MD        sodium chloride (PF) 0.9% PF flush 3 mL  3 mL Intracatheter Q8H Vishnu Chadwick MD   3 mL at 05/12/24 1602    tamsulosin (FLOMAX) capsule 0.4 mg  0.4 mg Oral At Bedtime Vishnu Chadwick MD   0.4 mg at 05/13/24 2114     Data     Recent Labs   Lab 05/14/24  0610 05/13/24  0756 05/12/24  0555   WBC 7.3 7.1 7.9   HGB 14.0 14.3 13.5   HCT 41.6 42.8 40.3   MCV 90 91  "91    275 241     Recent Labs   Lab 05/14/24  0610 05/13/24  0756 05/12/24  0555    139 138   POTASSIUM 4.4 4.5 4.3   CHLORIDE 104 103 103   CO2 25 25 26   ANIONGAP 9 11 9   * 99 102*   BUN 5.6* 6.4* 9.5   CR 0.89 0.85 1.00   GFRESTIMATED >90 >90 86   RASHI 9.3 9.2 8.5*     7-Day Micro Results       Collected Updated Procedure Result Status      05/11/2024 1514 05/13/2024 2008 Blood Culture Arm, Left [[id number]]   Blood from Arm, Left    Preliminary result Component Value   Culture No growth after 2 days  [P]                05/11/2024 1344 05/13/2024 1547 Blood Culture Peripheral Blood [[id number]]   Peripheral Blood    Preliminary result Component Value   Culture No growth after 2 days  [P]                      Recent Labs   Lab 05/11/24  1246   AST 18   ALT 18   ALKPHOS 107   BILITOTAL 0.9     No results for input(s): \"INR\" in the last 168 hours.  Recent Labs   Lab 05/11/24  1344   LACT 1.0     Recent Labs   Lab 05/11/24  1246   LIPASE 15       No results found for this or any previous visit (from the past 24 hour(s)).         "

## 2024-05-14 NOTE — PLAN OF CARE
"/79 (BP Location: Right arm)   Pulse 66   Temp 97.9  F (36.6  C) (Oral)   Resp 18   Ht 1.778 m (5' 10\")   Wt 77.9 kg (171 lb 11.2 oz)   SpO2 95%   BMI 24.64 kg/m      Neuro: a/o x4  Cardiac: WNL  Lungs: WNL  GI: WNL  : WNL  Pain: 7/10- dilaudid given with pain  IV: saline locked  Meds: diaudid, zofran, zosyn  Labs/tests: CT- acute sigmoid diverticulitis, perforation   Diet: NPO  Activity: independent  Misc: Hemorrhoids. CRS following.    Plan: Currently resting in bed, able to make need known.         Problem: Oral Intake Inadequate  Goal: Improved Oral Intake  Outcome: Progressing     Problem: Pain Acute  Goal: Optimal Pain Control and Function  Outcome: Progressing  Intervention: Develop Pain Management Plan  Recent Flowsheet Documentation  Taken 5/14/2024 1610 by Reba Garcia RN  Pain Management Interventions: medication (see MAR)  Intervention: Prevent or Manage Pain  Recent Flowsheet Documentation  Taken 5/14/2024 1610 by Reba Garcia RN  Medication Review/Management: medications reviewed  Intervention: Develop Pain Management Plan  Recent Flowsheet Documentation  Taken 5/14/2024 1610 by Reba Garcia RN  Pain Management Interventions: medication (see MAR)  Intervention: Prevent or Manage Pain  Recent Flowsheet Documentation  Taken 5/14/2024 1610 by Reba Garcia RN  Medication Review/Management: medications reviewed     Problem: Infection  Goal: Absence of Infection Signs and Symptoms  Outcome: Progressing     Problem: Adult Inpatient Plan of Care  Goal: Plan of Care Review  Description: The Plan of Care Review/Shift note should be completed every shift.  The Outcome Evaluation is a brief statement about your assessment that the patient is improving, declining, or no change.  This information will be displayed automatically on your shift  note.  Outcome: Progressing  Flowsheets (Taken 5/14/2024 1840)  Outcome Evaluation: NPO per CRS. diluadid given for pain.  Plan of Care Reviewed " "With: patient  Overall Patient Progress: improving  Goal: Patient-Specific Goal (Individualized)  Description: You can add care plan individualizations to a care plan. Examples of Individualization might be:  \"Parent requests to be called daily at 9am for status\", \"I have a hard time hearing out of my right ear\", or \"Do not touch me to wake me up as it startles  me\".  Outcome: Progressing  Goal: Absence of Hospital-Acquired Illness or Injury  Outcome: Progressing  Intervention: Identify and Manage Fall Risk  Recent Flowsheet Documentation  Taken 5/14/2024 1610 by Reba Garcia RN  Safety Promotion/Fall Prevention:   assistive device/personal items within reach   clutter free environment maintained   nonskid shoes/slippers when out of bed   safety round/check completed  Intervention: Prevent Skin Injury  Recent Flowsheet Documentation  Taken 5/14/2024 1610 by Reba Garcia RN  Body Position: position changed independently  Intervention: Prevent and Manage VTE (Venous Thromboembolism) Risk  Recent Flowsheet Documentation  Taken 5/14/2024 1610 by Reba Garcia RN  VTE Prevention/Management: SCDs (sequential compression devices) off  Goal: Optimal Comfort and Wellbeing  Outcome: Progressing  Intervention: Monitor Pain and Promote Comfort  Recent Flowsheet Documentation  Taken 5/14/2024 1610 by Reba Garcia RN  Pain Management Interventions: medication (see MAR)  Goal: Readiness for Transition of Care  Outcome: Progressing       Goal Outcome Evaluation:      Plan of Care Reviewed With: patient    Overall Patient Progress: improvingOverall Patient Progress: improving    Outcome Evaluation: NPO per CRS. diluadid given for pain.      "

## 2024-05-14 NOTE — PROVIDER NOTIFICATION
Dr. Connor rasmussen messaged this AM.     0759: :Pt reports he is nauseous (I gave Zofran) this morning, in moderate pain (refusing take anything because he doesn't want to cause more constipation). He had a BM yesterday evening. His hemorrhoids are causing him a lot of pain as well, they are swollen and red (he has preparation h that he has been using he said it is not helping). He also reports he cannot pee all the way this morning (bladder scanned for 35cc, just now). He is pretty upset in the room, could you come and see him early this morning? Thanks.    0801: She will come to bedside. Another set of VS done.

## 2024-05-14 NOTE — PLAN OF CARE
[id number]:  A&Ox4. IV Dilaudid given x2. PO Zofran this morning for nausea. Continuing IV Zosyn. CT this AM, per radiologist and results review shows, acute sigmoid diverticulitis, free air suggesting a contained perf. CRS consulted, pt made NPO. Writer assessed patients hemorrhoids, he has 3 very large, swollen, red hemorrhoids. CRS ordered hydrocortisone cream. Discharge pending.     Problem: Adult Inpatient Plan of Care  Goal: Plan of Care Review  Description: The Plan of Care Review/Shift note should be completed every shift.  The Outcome Evaluation is a brief statement about your assessment that the patient is improving, declining, or no change.  This information will be displayed automatically on your shift  note.  Recent Flowsheet Documentation  Taken 5/14/2024 1404 by Aileen Navarrete, RN  Outcome Evaluation: IV Dilaudid x2 for pain. CRS consulted, changed diet to NPO. Hydrocortisone cream ordered for hemorrhoids.  Plan of Care Reviewed With: patient  Overall Patient Progress: declining

## 2024-05-15 LAB
ANION GAP SERPL CALCULATED.3IONS-SCNC: 8 MMOL/L (ref 7–15)
BUN SERPL-MCNC: 7.6 MG/DL (ref 8–23)
CALCIUM SERPL-MCNC: 8.9 MG/DL (ref 8.8–10.2)
CHLORIDE SERPL-SCNC: 105 MMOL/L (ref 98–107)
CREAT SERPL-MCNC: 0.96 MG/DL (ref 0.67–1.17)
DEPRECATED HCO3 PLAS-SCNC: 27 MMOL/L (ref 22–29)
EGFRCR SERPLBLD CKD-EPI 2021: 90 ML/MIN/1.73M2
ERYTHROCYTE [DISTWIDTH] IN BLOOD BY AUTOMATED COUNT: 13 % (ref 10–15)
GLUCOSE SERPL-MCNC: 90 MG/DL (ref 70–99)
HCT VFR BLD AUTO: 40.6 % (ref 40–53)
HGB BLD-MCNC: 13.7 G/DL (ref 13.3–17.7)
MAGNESIUM SERPL-MCNC: 2.1 MG/DL (ref 1.7–2.3)
MCH RBC QN AUTO: 30.6 PG (ref 26.5–33)
MCHC RBC AUTO-ENTMCNC: 33.7 G/DL (ref 31.5–36.5)
MCV RBC AUTO: 91 FL (ref 78–100)
PLATELET # BLD AUTO: 267 10E3/UL (ref 150–450)
POTASSIUM SERPL-SCNC: 4.1 MMOL/L (ref 3.4–5.3)
RBC # BLD AUTO: 4.47 10E6/UL (ref 4.4–5.9)
SODIUM SERPL-SCNC: 140 MMOL/L (ref 135–145)
WBC # BLD AUTO: 5.9 10E3/UL (ref 4–11)

## 2024-05-15 PROCEDURE — 84520 ASSAY OF UREA NITROGEN: CPT | Performed by: INTERNAL MEDICINE

## 2024-05-15 PROCEDURE — 85027 COMPLETE CBC AUTOMATED: CPT | Performed by: INTERNAL MEDICINE

## 2024-05-15 PROCEDURE — 36415 COLL VENOUS BLD VENIPUNCTURE: CPT | Performed by: INTERNAL MEDICINE

## 2024-05-15 PROCEDURE — 120N000001 HC R&B MED SURG/OB

## 2024-05-15 PROCEDURE — 83735 ASSAY OF MAGNESIUM: CPT | Performed by: INTERNAL MEDICINE

## 2024-05-15 PROCEDURE — 250N000011 HC RX IP 250 OP 636: Performed by: INTERNAL MEDICINE

## 2024-05-15 PROCEDURE — 258N000003 HC RX IP 258 OP 636: Performed by: INTERNAL MEDICINE

## 2024-05-15 PROCEDURE — 99233 SBSQ HOSP IP/OBS HIGH 50: CPT | Performed by: INTERNAL MEDICINE

## 2024-05-15 PROCEDURE — 80048 BASIC METABOLIC PNL TOTAL CA: CPT | Performed by: INTERNAL MEDICINE

## 2024-05-15 PROCEDURE — 250N000013 HC RX MED GY IP 250 OP 250 PS 637: Performed by: INTERNAL MEDICINE

## 2024-05-15 RX ORDER — TEMAZEPAM 7.5 MG/1
7.5 CAPSULE ORAL
Status: DISCONTINUED | OUTPATIENT
Start: 2024-05-15 | End: 2024-05-15

## 2024-05-15 RX ADMIN — PIPERACILLIN AND TAZOBACTAM 3.38 G: 3; .375 INJECTION, POWDER, FOR SOLUTION INTRAVENOUS at 21:40

## 2024-05-15 RX ADMIN — PIPERACILLIN AND TAZOBACTAM 3.38 G: 3; .375 INJECTION, POWDER, FOR SOLUTION INTRAVENOUS at 16:48

## 2024-05-15 RX ADMIN — GLYCERIN, PETROLATUM, PHENYLEPHRINE HCL, PRAMOXINE HCL: 144; 2.5; 10; 15 CREAM TOPICAL at 16:49

## 2024-05-15 RX ADMIN — TAMSULOSIN HYDROCHLORIDE 0.4 MG: 0.4 CAPSULE ORAL at 21:41

## 2024-05-15 RX ADMIN — HYDROMORPHONE HYDROCHLORIDE 0.2 MG: 0.2 INJECTION, SOLUTION INTRAMUSCULAR; INTRAVENOUS; SUBCUTANEOUS at 21:50

## 2024-05-15 RX ADMIN — HYDROMORPHONE HYDROCHLORIDE 0.2 MG: 0.2 INJECTION, SOLUTION INTRAMUSCULAR; INTRAVENOUS; SUBCUTANEOUS at 14:50

## 2024-05-15 RX ADMIN — PIPERACILLIN AND TAZOBACTAM 3.38 G: 3; .375 INJECTION, POWDER, FOR SOLUTION INTRAVENOUS at 10:19

## 2024-05-15 RX ADMIN — HYDROMORPHONE HYDROCHLORIDE 0.4 MG: 0.2 INJECTION, SOLUTION INTRAMUSCULAR; INTRAVENOUS; SUBCUTANEOUS at 06:11

## 2024-05-15 RX ADMIN — HYDROMORPHONE HYDROCHLORIDE 0.2 MG: 0.2 INJECTION, SOLUTION INTRAMUSCULAR; INTRAVENOUS; SUBCUTANEOUS at 10:26

## 2024-05-15 RX ADMIN — HYDROMORPHONE HYDROCHLORIDE 0.2 MG: 0.2 INJECTION, SOLUTION INTRAMUSCULAR; INTRAVENOUS; SUBCUTANEOUS at 19:09

## 2024-05-15 RX ADMIN — PIPERACILLIN AND TAZOBACTAM 3.38 G: 3; .375 INJECTION, POWDER, FOR SOLUTION INTRAVENOUS at 03:33

## 2024-05-15 RX ADMIN — GLYCERIN, PETROLATUM, PHENYLEPHRINE HCL, PRAMOXINE HCL: 144; 2.5; 10; 15 CREAM TOPICAL at 08:41

## 2024-05-15 RX ADMIN — ACETAMINOPHEN 650 MG: 325 TABLET, FILM COATED ORAL at 07:36

## 2024-05-15 RX ADMIN — SODIUM CHLORIDE: 9 INJECTION, SOLUTION INTRAVENOUS at 16:48

## 2024-05-15 ASSESSMENT — ACTIVITIES OF DAILY LIVING (ADL)
ADLS_ACUITY_SCORE: 22

## 2024-05-15 NOTE — CONSULTS
[facility]  Colon and Rectal Surgery Consult Note  Name: Pawel Vallejo    MRN: [id number]  YOB: 1965    Age: 58 year old  Date of admission: 5/11/2024  Primary care provider: No Ref-Primary, Physician     Requesting Physician:  Dr. Ryan  Reason for consult:  worsening diverticulitis and painful hemorrhoids           History of Present Illness:   Pawel Vallejo is a 58 year old male, seen at the request of Dr. Ryan.  He presented to the hospital on May 11 with a several day history of abdominal pain, fevers and chills.  Workup in the ER included a CT scan which demonstrated diverticulitis.  He was admitted to the hospitalist service and started on IV antibiotics.  His diet was slowly advanced to low fiber diet.  This morning he noted acute worsening of his abdominal pain similar to what he experienced prior to admission.  He also had a pain associated with some hemorrhoids.  Repeat CT scan demonstrates stable diverticulitis with foci of extraluminal air concerning for microperforation.  He currently reports improvement in his pain although he is taking pain medications more frequently.  He has been afebrile with a normal white count.  This is his first episode of diverticulitis.  He is up-to-date on colonoscopies.  He reports a family history of Crohn's disease as well as cancer.              Colonoscopy History: 2016 -per report several polyps were removed.  There is also note of sigmoid diverticulosis.              Past Medical History:     Past Medical History:   Diagnosis Date    BPH (benign prostatic hyperplasia)     Chronic back pain     Gastroesophageal reflux disease with esophagitis     History of hypertension     HLD (hyperlipidemia)     Pleurisy              Past Surgical History:     Past Surgical History:   Procedure Laterality Date    CHOLECYSTECTOMY      DECOMPRESSION, FUSION CERVICAL ANTERIOR TWO LEVELS, COMBINED Left 12/31/2020    Procedure: Cervical 4 to 6  Anterior Cervical Decompression and Fusion with [employer] Plate and Screws, Interbody Device, use of Fluoroscopy, Microscope;  Surgeon: Pawel Irizarry MD;  Location: [facility] OR    GRAFT BONE FROM ILIAC CREST Left 12/31/2020    Procedure: and Left Sided Iliac Crest Autograft;  Surgeon: Pawel Irizarry MD;  Location: [facility] OR    HERNIA REPAIR      Diastasis recti/ abdominal    LEFT shoulder arthroscopic rotator cuff repair, subacromial decompression and bicep tenodesis  2018               Social History:     Social History     Tobacco Use    Smoking status: Former     Current packs/day: 0.00     Average packs/day: 0.3 packs/day for 30.0 years (7.5 ttl pk-yrs)     Types: Cigarettes     Start date: 10/1990     Quit date: 10/2020     Years since quitting: 3.6    Smokeless tobacco: Never    Tobacco comments:     quit in 2012. restarted in the past couple of years and then smoked more socially.   Substance Use Topics    Alcohol use: Yes     Comment: socially             Family History:   No family history on file.          Allergies:   No Known Allergies          Medications:     Current Facility-Administered Medications   Medication Dose Route Frequency Provider Last Rate Last Admin    hydrocortisone 2.5 % cream   Topical BID Flynn Louis PA-C   Given at 05/14/24 2113    piperacillin-tazobactam (ZOSYN) 3.375 g vial to attach to  mL bag  3.375 g Intravenous Q6H Vishnu Chadwick MD   3.375 g at 05/14/24 2111    pramox-pe-glycerin-petrolatum (PREPARATION H) cream   Rectal TID Radha Ryan MD   Given at 05/14/24 0841    sodium chloride (PF) 0.9% PF flush 3 mL  3 mL Intracatheter Q8H Vishnu Chadwick MD   3 mL at 05/14/24 0840    tamsulosin (FLOMAX) capsule 0.4 mg  0.4 mg Oral At Bedtime Vishnu Chadwick MD   0.4 mg at 05/14/24 2112             Review of Systems:   A comprehensive greater than 10 system review of systems was carried out.  Pertinent positives and negatives are noted  "above.  Otherwise negative for contributory info.            Physical Exam:     Blood pressure 117/79, pulse 68, temperature 97.5  F (36.4  C), temperature source Oral, resp. rate 16, height 1.778 m (5' 10\"), weight 77.9 kg (171 lb 11.2 oz), SpO2 93%.  No intake or output data in the 24 hours ending 05/14/24 2301  Exam:  General - Awake alert and oriented, appears stated age  Pulm - Non-labored breathing with normal respiratory effort  CVS - reg rate and rhythm, no peripheral edema  Abd -soft, nondistended, discomfort with palpation in the lower abdomen.  There is no rebound guarding or peritoneal signs., Rectal exam was not  performed.   Neuro - CN II-XII grossly intact  Musculoskeletal - extremities with no clubbing, cyanosis or edema; able to ambulate  Psych - responsive, alert, cooperative; oriented x3; appropriate mood and affect  External/skin - inspection reveals no rashes, lesions or ulcers, normal coloring           Data Reviewed:     Recent Results (from the past 24 hour(s))   CT Abdomen Pelvis w Contrast    Narrative    CT ABDOMEN AND PELVIS WITH CONTRAST 5/14/2024 9:28 AM    CLINICAL HISTORY: Worsening abdominal pain. Recent history of  diverticulitis.    TECHNIQUE: CT scan of the abdomen and pelvis was performed following  injection of IV contrast. Multiplanar reformats were obtained. Dose  reduction techniques were used.  CONTRAST: 86 mL Isovue-[id number]    COMPARISON: None.    FINDINGS:   LOWER CHEST: Scattered calcified granulomas at both lung bases. Mild  scarring or linear atelectasis at both lung bases posteriorly.    HEPATOBILIARY: No hepatic masses. Cholecystectomy.    PANCREAS: Normal.    SPLEEN: Normal.    ADRENAL GLANDS: Normal.    KIDNEYS/BLADDER: Mild malrotation of the left kidney. No  hydronephrosis.    BOWEL: No bowel obstruction. Colonic diverticulosis. Mild bowel wall  thickening and adjacent fat stranding in the mid sigmoid colon,  consistent with acute diverticulitis. A small adjacent " extraluminal  air bubble suggests a contained perforation in this region. No  associated fluid collection to suggest abscess. No evidence for  colitis. Unremarkable appendix.    LYMPH NODES: No lymphadenopathy.    VASCULATURE: Mild atherosclerotic aortoiliac calcification.    PELVIC ORGANS: Mild prostatic enlargement.    ADDITIONAL FINDINGS: None.    MUSCULOSKELETAL: Unremarkable.      Impression    IMPRESSION: Acute sigmoid diverticulitis, with an adjacent  extraluminal air bubble suggesting contained perforation. No  associated fluid collection to suggest abscess.    TICO POLK MD         SYSTEM ID:  [id number]       Recent Labs   Lab 05/14/24  0610 05/13/24  0756 05/12/24  0555   WBC 7.3 7.1 7.9   HGB 14.0 14.3 13.5   HCT 41.6 42.8 40.3   MCV 90 91 91    275 241          Lab Results   Component Value Date     05/14/2024     05/13/2024     05/12/2024     05/29/2021     05/25/2021     05/24/2021    Lab Results   Component Value Date    CHLORIDE 104 05/14/2024    CHLORIDE 103 05/13/2024    CHLORIDE 103 05/12/2024    CHLORIDE 99 05/29/2021    CHLORIDE 101 05/25/2021    CHLORIDE 103 05/24/2021    Lab Results   Component Value Date    BUN 5.6 05/14/2024    BUN 6.4 05/13/2024    BUN 9.5 05/12/2024    BUN 25 05/29/2021    BUN 26 05/25/2021    BUN 22 05/24/2021      Lab Results   Component Value Date    POTASSIUM 4.4 05/14/2024    POTASSIUM 4.5 05/13/2024    POTASSIUM 4.3 05/12/2024    POTASSIUM 3.7 05/29/2021    POTASSIUM 4.5 05/25/2021    POTASSIUM 4.8 05/24/2021    Lab Results   Component Value Date    CO2 25 05/14/2024    CO2 25 05/13/2024    CO2 26 05/12/2024    CO2 28 05/29/2021    CO2 28 05/25/2021    CO2 27 05/24/2021    Lab Results   Component Value Date    CR 0.89 05/14/2024    CR 0.85 05/13/2024    CR 1.00 05/12/2024    CR 0.85 05/29/2021    CR 0.73 05/25/2021    CR 0.72 05/24/2021            Assessment and Plan:   This is a 58-year-old male admitted with  diverticulitis.  He had acute worsening of his pain and repeat CT scan shows microperforation.  He is currently clinically stable.  At this time no acute surgical intervention is indicated.  Would continue with IV antibiotics and n.p.o.  Monitor labs.  We discussed that if he does not show any clinical improvement or should clinically worsen then he will need surgery during this admission which would involve us colectomy with a likely temporary stoma.  We discussed assuming that he improves with conservative management he should have a follow-up: I will be in 6 to 8 weeks once he recovers from this acute episode.  Colorectal surgery will continue to follow the patient.  Please contact us with any questions concerns or changes in the patient's clinical status.      Additional history obtained from EPIC and patient.  Time spent on consultation: 60 minutes,  greater than 50% of total encounter time was spent in counseling and or coordination of care.            Lisset Quinteros MD  [facility]  Phone: [phone]  Fax: [phone]

## 2024-05-15 NOTE — PROGRESS NOTES
COLON & RECTAL SURGERY  PROGRESS NOTE    May 15, 2024    SUBJECTIVE: Patient feels okay this morning. Pain may be better than yesterday but is unsure as he just received pain medication recently. No n/v. Feeling a little hungry. Has received Dilaudid x6 yesterday. Passing gas. Last BM 2 days ago which was soft and without blood. AVSS. WBC normal yesterday, AM labs pending.     OBJECTIVE:  Temp:  [97.5  F (36.4  C)-98.3  F (36.8  C)] 97.7  F (36.5  C)  Pulse:  [65-79] 65  Resp:  [16-20] 18  BP: (117-148)/(74-89) 148/74  SpO2:  [93 %-99 %] 93 %  No intake or output data in the 24 hours ending 05/15/24 0735    GENERAL:  Awake, alert, no acute distress, lying in bed  HEAD: Nomocephalic atraumatic  SCLERA: anicteric  EXTREMITIES: warm and well perfused  ABDOMEN:  Soft, mild suprapubic tenderness, non-distended, no rebound or guarding, no peritoneal signs    LABS:  Lab Results   Component Value Date    WBC 7.3 05/14/2024    WBC 15.4 05/29/2021     Lab Results   Component Value Date    HGB 14.0 05/14/2024    HGB 15.2 05/29/2021     Lab Results   Component Value Date    HCT 41.6 05/14/2024    HCT 45.0 05/29/2021     Lab Results   Component Value Date     05/14/2024     05/29/2021     Last Basic Metabolic Panel:  Lab Results   Component Value Date     05/14/2024     05/29/2021      Lab Results   Component Value Date    POTASSIUM 4.4 05/14/2024    POTASSIUM 3.7 05/29/2021     Lab Results   Component Value Date    CHLORIDE 104 05/14/2024    CHLORIDE 99 05/29/2021     Lab Results   Component Value Date    RASHI 9.3 05/14/2024    RASHI 8.3 05/29/2021     Lab Results   Component Value Date    CO2 25 05/14/2024    CO2 28 05/29/2021     Lab Results   Component Value Date    BUN 5.6 05/14/2024    BUN 25 05/29/2021     Lab Results   Component Value Date    CR 0.89 05/14/2024    CR 0.85 05/29/2021     Lab Results   Component Value Date     05/14/2024     05/29/2021       ASSESSMENT/PLAN: Gustavo isaac  58 year old man admitted on 5/11 for diverticulitis. Repeat CT scan on 5/15 with worsening diverticulitis with associated microperforation. No abscess.    - Clear liquids. Go slow. If worsening pain or nausea, should back down to NPO.  - Continue IV antibiotics  - Pain management as needed, minimize narcotics  - Encourage ambulation  - No plans for surgery      For questions/paging, please contact the CRS office at [phone].    Delaney De La Cruz PA-C  [facility]  Phone: [phone]      Colon and Rectal Surgery Attending Note    Patient seen and examined independently.  Agree with above assessment and plan.    Sitting up in bed comfortable doing work.  Denies nausea or vomiting but did have some increased discomfort with clear liquids.  Positive flatus.  No BM in the last 2 days.    Abdomen is soft, nontender, nondistended.    Plan: Continue IV antibiotics, clear liquids and minimize if increased pain.  Encourage ambulation.  Minimize narcotics as possible to improve bowel function.  No plans for surgery at this time but if he fails to improve or worsens we may need to consider.      Clinically Significant Risk Factors                                      Cassandra Matias MD  [facility]  [address], [address]  [place] [address]  T: [phone]  F: [phone]   [email or web address]

## 2024-05-15 NOTE — PLAN OF CARE
Pertinent assessments: A&Ox4. Up ind. VSS. RA. Using IV dilaudid for abd pain. Denies nausea. Passing gas & stool. Complaints of large external hemorrhoids. Using preparation H. NPO. IVF restarted.     Major Shift Events: Uneventful   Treatment Plan: IV antibiotics, pain control.   Bedside Nurse: Jing Delcid RN           Problem: Adult Inpatient Plan of Care  Goal: Plan of Care Review  Description: The Plan of Care Review/Shift note should be completed every shift.  The Outcome Evaluation is a brief statement about your assessment that the patient is improving, declining, or no change.  This information will be displayed automatically on your shift  note.  Recent Flowsheet Documentation  Taken 5/15/2024 0541 by Jing Delcid RN  Outcome Evaluation: NPO. IVF restarted  Plan of Care Reviewed With: patient  Overall Patient Progress: improving  Goal: Absence of Hospital-Acquired Illness or Injury  Intervention: Identify and Manage Fall Risk  Recent Flowsheet Documentation  Taken 5/14/2024 2148 by Jing Delcid RN  Safety Promotion/Fall Prevention:   assistive device/personal items within reach   clutter free environment maintained   nonskid shoes/slippers when out of bed   safety round/check completed  Intervention: Prevent Skin Injury  Recent Flowsheet Documentation  Taken 5/14/2024 2148 by Jing Delcid RN  Body Position: position changed independently  Intervention: Prevent and Manage VTE (Venous Thromboembolism) Risk  Recent Flowsheet Documentation  Taken 5/14/2024 2148 by Jing Delcid RN  VTE Prevention/Management: SCDs (sequential compression devices) off   oal Outcome Evaluation:      Plan of Care Reviewed With: patient    Overall Patient Progress: improvingOverall Patient Progress: improving    Outcome Evaluation: NPO. IVF restarted

## 2024-05-15 NOTE — PROGRESS NOTES
[facility]    Medicine Progress Note - Hospitalist Service    Date of Admission:  5/11/2024    Assessment & Plan   Summary of Stay: Pawel Vallejo is a 58 year old male who was admitted on 5/11/2024 for sigmoid diverticulitis.  Patient's past medical history significant for BPH, chronic back pain, GERD, constipation presented with right lower quadrant abdominal pain.  Valuation the emergency room showed sigmoid diverticulitis.    Acute sigmoid diverticulitis  Continues to have worsening abdominal pain  -Require frequent pain meds  -CLD.  CT abdomen pelvis  5/14 stable with acute sigmoid diverticulitis with contained perforation.  -Continue as needed pain meds  -Continue IV  antibiotics  -Consult colorectal surgery    Constipation  -Resolved    Painful hemorrhoids  -Consulted colorectal surgery      Imaging in ED showed/incidental findings  Atherosclerotic vascular disease  Emphysematous change and granulomatous change involving lungs  -Will defer to PCP on discharge further workup and restratification          Diet: Diet  Clear Liquid Diet    DVT Prophylaxis: Pneumatic Compression Devices  Valente Catheter: Not present  Lines: None     Cardiac Monitoring: None  Code Status: Full Code      Clinically Significant Risk Factors                                    Disposition Plan     Medically Ready for Discharge: Anticipated in 2-4 Days             Calvin Davis MD  Hospitalist Service  [facility]  Securely message with [employer] (more info)  Text page via [employer] Paging/Directory   ______________________________________________________________________    Interval History     No fevers. + abdominal pain. + flatus.    Physical Exam   Vital Signs: Temp: 97.7  F (36.5  C) Temp src: Oral BP: (!) 148/74 Pulse: 65   Resp: 18 SpO2: 93 % O2 Device: None (Room air)    Weight: 171 lbs 11.2 oz    Gen - AAO x 3 in NAD.  Lungs - CTA B.  Heart - RR,S1+S2 nml, no m/g/r.  Abd - soft, + ttp  LLQ, ND, + BS. No rebound.  Ext - no edema.    Medical Decision Making       60 MINUTES SPENT BY ME on the date of service doing chart review, history, exam, documentation & further activities per the note.      Data     I have personally reviewed the following data over the past 24 hrs:    5.9  \   13.7   / 267     140 105 7.6 (L) /  90   4.1 27 0.96 \       Imaging results reviewed over the past 24 hrs:   No results found for this or any previous visit (from the past 24 hour(s)).

## 2024-05-15 NOTE — PLAN OF CARE
Goal Outcome Evaluation:      Plan of Care Reviewed With: patient, spouse    Overall Patient Progress: improvingOverall Patient Progress: improving  Pt is alert and oriented. Independent in the room. Tolerated clear liquid diet. Received IV Zosyn. Infusing NS at 100cc/hr. Abdomen pain was managed with IV Dilaudid per patient preference. Seen by colorectal. No surgical intervention decision yet. Denies nausea or vomiting. vss

## 2024-05-15 NOTE — UTILIZATION REVIEW
Admission Status; Secondary Review Determination    Under the authority of the Utilization Management Committee, the utilization review process indicated a secondary review on the above patient. The review outcome is based on review of the medical records, discussions with staff, and applying clinical experience noted on the date of the review.    (x) Inpatient Status Appropriate - This patient's medical care is consistent with medical management for inpatient care and reasonable inpatient medical practice.    RATIONALE FOR DETERMINATION:   Reason for review is inhospital insurance denial    58-year-old male presented to the hospital with 3 to 4 days of progressively worsening abdominal pain along with some subjective fevers and chills.  Patient has prior history of small bowel obstruction 11 years ago.  Imaging revealed acute diverticulitis.  Patient had significant pain requiring 6 doses of intravenous Dilaudid during the first 24 hours.  Unfortunately patient continued to have intermittent escalation of pain requiring ongoing frequent doses of IV narcotics on hospital day 3 along with continued IV antibiotics.  Due to the persistence of pain and increased tenderness on exam on hospital day 4 a follow-up CT of the abdomen pelvis was completed revealing the acute diverticulitis with a new contained perforation in the region.  Colorectal surgery consultation was thus undertaken due to the persistence of patient's significant infection it was recommended that patient continue with IV antibiotics and n.p.o. status on hospital day 4.  Thus with the prolonged nature of patient's acute diverticulitis and need for IV fluids, n.p.o. status and frequent doses of IV narcotics, inpatient management is certainly appropriate.    At the time of admission with the information available to the attending physician more than 2 nights Hospital complex care was anticipated, based on patient risk of adverse outcome if treated as  outpatient and complex care required. Inpatient admission is appropriate based on the Medicare guidelines.    This document was produced using voice recognition software    The information on this document is developed by the utilization review team in order for the business office to ensure compliance. This only denotes the appropriateness of proper admission status and does not reflect the quality of care rendered.    The definitions of Inpatient Status and Observation Status used in making the determination above are those provided in the CMS Coverage Manual, Chapter 1 and Chapter 6, section 70.4.    Sincerely,    Luisito Heck MD  Utilization Review  Physician Advisor  [facility].

## 2024-05-16 LAB
BACTERIA BLD CULT: NO GROWTH
BACTERIA BLD CULT: NO GROWTH
BASOPHILS # BLD AUTO: 0 10E3/UL (ref 0–0.2)
BASOPHILS NFR BLD AUTO: 1 %
EOSINOPHIL # BLD AUTO: 0.2 10E3/UL (ref 0–0.7)
EOSINOPHIL NFR BLD AUTO: 3 %
ERYTHROCYTE [DISTWIDTH] IN BLOOD BY AUTOMATED COUNT: 12.6 % (ref 10–15)
HCT VFR BLD AUTO: 42.3 % (ref 40–53)
HGB BLD-MCNC: 14.2 G/DL (ref 13.3–17.7)
IMM GRANULOCYTES # BLD: 0 10E3/UL
IMM GRANULOCYTES NFR BLD: 1 %
LYMPHOCYTES # BLD AUTO: 1.8 10E3/UL (ref 0.8–5.3)
LYMPHOCYTES NFR BLD AUTO: 29 %
MAGNESIUM SERPL-MCNC: 2.2 MG/DL (ref 1.7–2.3)
MCH RBC QN AUTO: 30.5 PG (ref 26.5–33)
MCHC RBC AUTO-ENTMCNC: 33.6 G/DL (ref 31.5–36.5)
MCV RBC AUTO: 91 FL (ref 78–100)
MONOCYTES # BLD AUTO: 0.5 10E3/UL (ref 0–1.3)
MONOCYTES NFR BLD AUTO: 8 %
NEUTROPHILS # BLD AUTO: 3.6 10E3/UL (ref 1.6–8.3)
NEUTROPHILS NFR BLD AUTO: 58 %
NRBC # BLD AUTO: 0 10E3/UL
NRBC BLD AUTO-RTO: 0 /100
PLATELET # BLD AUTO: 284 10E3/UL (ref 150–450)
POTASSIUM SERPL-SCNC: 3.9 MMOL/L (ref 3.4–5.3)
RBC # BLD AUTO: 4.65 10E6/UL (ref 4.4–5.9)
WBC # BLD AUTO: 6.2 10E3/UL (ref 4–11)

## 2024-05-16 PROCEDURE — 258N000003 HC RX IP 258 OP 636: Performed by: INTERNAL MEDICINE

## 2024-05-16 PROCEDURE — 250N000011 HC RX IP 250 OP 636: Performed by: INTERNAL MEDICINE

## 2024-05-16 PROCEDURE — 120N000001 HC R&B MED SURG/OB

## 2024-05-16 PROCEDURE — 83735 ASSAY OF MAGNESIUM: CPT | Performed by: INTERNAL MEDICINE

## 2024-05-16 PROCEDURE — 85025 COMPLETE CBC W/AUTO DIFF WBC: CPT | Performed by: INTERNAL MEDICINE

## 2024-05-16 PROCEDURE — 250N000013 HC RX MED GY IP 250 OP 250 PS 637: Performed by: INTERNAL MEDICINE

## 2024-05-16 PROCEDURE — 84132 ASSAY OF SERUM POTASSIUM: CPT | Performed by: INTERNAL MEDICINE

## 2024-05-16 PROCEDURE — 36415 COLL VENOUS BLD VENIPUNCTURE: CPT | Performed by: INTERNAL MEDICINE

## 2024-05-16 PROCEDURE — 99233 SBSQ HOSP IP/OBS HIGH 50: CPT | Performed by: INTERNAL MEDICINE

## 2024-05-16 RX ADMIN — HYDROMORPHONE HYDROCHLORIDE 0.2 MG: 0.2 INJECTION, SOLUTION INTRAMUSCULAR; INTRAVENOUS; SUBCUTANEOUS at 18:24

## 2024-05-16 RX ADMIN — GLYCERIN, PETROLATUM, PHENYLEPHRINE HCL, PRAMOXINE HCL: 144; 2.5; 10; 15 CREAM TOPICAL at 17:35

## 2024-05-16 RX ADMIN — GLYCERIN, PETROLATUM, PHENYLEPHRINE HCL, PRAMOXINE HCL: 144; 2.5; 10; 15 CREAM TOPICAL at 08:55

## 2024-05-16 RX ADMIN — PIPERACILLIN AND TAZOBACTAM 3.38 G: 3; .375 INJECTION, POWDER, FOR SOLUTION INTRAVENOUS at 17:32

## 2024-05-16 RX ADMIN — HYDROMORPHONE HYDROCHLORIDE 0.2 MG: 0.2 INJECTION, SOLUTION INTRAMUSCULAR; INTRAVENOUS; SUBCUTANEOUS at 17:39

## 2024-05-16 RX ADMIN — HYDROMORPHONE HYDROCHLORIDE 0.4 MG: 0.2 INJECTION, SOLUTION INTRAMUSCULAR; INTRAVENOUS; SUBCUTANEOUS at 10:03

## 2024-05-16 RX ADMIN — PIPERACILLIN AND TAZOBACTAM 3.38 G: 3; .375 INJECTION, POWDER, FOR SOLUTION INTRAVENOUS at 03:34

## 2024-05-16 RX ADMIN — SODIUM CHLORIDE: 9 INJECTION, SOLUTION INTRAVENOUS at 03:34

## 2024-05-16 RX ADMIN — GLYCERIN, PETROLATUM, PHENYLEPHRINE HCL, PRAMOXINE HCL: 144; 2.5; 10; 15 CREAM TOPICAL at 21:50

## 2024-05-16 RX ADMIN — HYDROMORPHONE HYDROCHLORIDE 0.4 MG: 0.2 INJECTION, SOLUTION INTRAMUSCULAR; INTRAVENOUS; SUBCUTANEOUS at 13:26

## 2024-05-16 RX ADMIN — PIPERACILLIN AND TAZOBACTAM 3.38 G: 3; .375 INJECTION, POWDER, FOR SOLUTION INTRAVENOUS at 21:50

## 2024-05-16 RX ADMIN — TAMSULOSIN HYDROCHLORIDE 0.4 MG: 0.4 CAPSULE ORAL at 21:49

## 2024-05-16 RX ADMIN — PIPERACILLIN AND TAZOBACTAM 3.38 G: 3; .375 INJECTION, POWDER, FOR SOLUTION INTRAVENOUS at 10:04

## 2024-05-16 ASSESSMENT — ACTIVITIES OF DAILY LIVING (ADL)
ADLS_ACUITY_SCORE: 22

## 2024-05-16 NOTE — PROGRESS NOTES
"Colon & Rectal Surgery Progress Note             Interval History:     Feeling a bit better. + flatus, no BM. Tolerating clears without increased pain                Medications:   I have reviewed this patient's current medications               Physical Exam:   Blood pressure 119/71, pulse 67, temperature 98  F (36.7  C), temperature source Oral, resp. rate 16, height 1.778 m (5' 10\"), weight 77.9 kg (171 lb 11.2 oz), SpO2 93%.    Intake/Output Summary (Last 24 hours) at 5/16/2024 0732  Last data filed at 5/15/2024 1652  Gross per 24 hour   Intake 1476 ml   Output --   Net 1476 ml     GEN:  alert  ABD:  focal LLQ pain.          Data:        Lab Results   Component Value Date     05/15/2024     05/29/2021    Lab Results   Component Value Date    CHLORIDE 105 05/15/2024    CHLORIDE 99 05/29/2021    Lab Results   Component Value Date    BUN 7.6 05/15/2024    BUN 25 05/29/2021      Lab Results   Component Value Date    POTASSIUM 3.9 05/16/2024    POTASSIUM 3.7 05/29/2021    Lab Results   Component Value Date    CO2 27 05/15/2024    CO2 28 05/29/2021    Lab Results   Component Value Date    CR 0.96 05/15/2024    CR 0.89 05/14/2024    CR 0.85 05/29/2021    CR 0.73 05/25/2021        Lab Results   Component Value Date    HGB 14.2 05/16/2024    HGB 13.7 05/15/2024     Lab Results   Component Value Date     05/16/2024     05/15/2024     Lab Results   Component Value Date    WBC 6.2 05/16/2024    WBC 5.9 05/15/2024            Assessment and Plan:     Okay for fulls.  Minimize narcotics.   Hopeful to avoid surgery.       Clinically Significant Risk Factors                                      Cassandra Matias MD  [facility]  [id number] [place] [address], [address]  [place] [address]  T: [phone]  F: [phone]   [email or web address]          "

## 2024-05-16 NOTE — PLAN OF CARE
Care from [id number]  End of Shift Summary  For vital signs and complete assessments, please see documentation flowsheets.     Pertinent assessments: A&O x4. VSS, RA. Up ad shree. PRN IV dilaudid given x1 for abd pain-- intervention effective per pt report. Tolerating CLD overnight, plan for fulls today. Colorectal following. Sleeping b/t cares and able to make needs known.     Major Shift Events: N/A    Treatment Plan: Advance to full liq, IV zosyn q6h, NS at 100 ml/hr, pain management.     Bedside Nurse: Krupa Perkins RN     Goal Outcome Evaluation:      Plan of Care Reviewed With: patient    Overall Patient Progress: improvingOverall Patient Progress: improving    Outcome Evaluation: Tolerating clear liquid diet. Per pt, pain improving.      Problem: Adult Inpatient Plan of Care  Goal: Plan of Care Review  Description: The Plan of Care Review/Shift note should be completed every shift.  The Outcome Evaluation is a brief statement about your assessment that the patient is improving, declining, or no change.  This information will be displayed automatically on your shift  note.  Recent Flowsheet Documentation  Taken 5/16/2024 0116 by Krupa Murphy, RN  Outcome Evaluation: Tolerating clear liquid diet. Per pt, pain improving.  Plan of Care Reviewed With: patient  Overall Patient Progress: improving  Goal: Absence of Hospital-Acquired Illness or Injury  Intervention: Identify and Manage Fall Risk  Recent Flowsheet Documentation  Taken 5/15/2024 2155 by Krupa Murphy, RN  Safety Promotion/Fall Prevention:   clutter free environment maintained   nonskid shoes/slippers when out of bed   patient and family education   room near nurse's station   safety round/check completed  Intervention: Prevent Infection  Recent Flowsheet Documentation  Taken 5/15/2024 2155 by Krupa Murphy, RN  Infection Prevention:   hand hygiene promoted   personal protective equipment utilized  Goal: Optimal Comfort and  Wellbeing  Intervention: Monitor Pain and Promote Comfort  Recent Flowsheet Documentation  Taken 5/15/2024 2150 by Krupa Murphy, RN  Pain Management Interventions: medication (see MAR)  Taken 5/15/2024 2137 by Krupa Murphy, RN  Pain Management Interventions: declines

## 2024-05-16 NOTE — PROGRESS NOTES
[facility]    Medicine Progress Note - Hospitalist Service    Date of Admission:  5/11/2024    Assessment & Plan   Summary of Stay: Pawel Vallejo is a 58 year old male who was admitted on 5/11/2024 for sigmoid diverticulitis.  Patient's past medical history significant for BPH, chronic back pain, GERD, constipation presented with right lower quadrant abdominal pain.  Valuation the emergency room showed sigmoid diverticulitis.    Acute sigmoid diverticulitis  Continues to have worsening abdominal pain  -FLD.  CT abdomen pelvis  5/14 stable with acute sigmoid diverticulitis with contained perforation.  -Continue as needed pain meds  -Continue IV  antibiotics  -Consult colorectal surgery    Painful hemorrhoids  -Consulted colorectal surgery      Imaging in ED showed/incidental findings  Atherosclerotic vascular disease  Emphysematous change and granulomatous change involving lungs  -Will defer to PCP on discharge further workup and restratification          Diet: Diet  Full Liquid Diet    DVT Prophylaxis: Pneumatic Compression Devices  Valente Catheter: Not present  Lines: None     Cardiac Monitoring: None  Code Status: Full Code      Clinically Significant Risk Factors                                    Disposition Plan     Medically Ready for Discharge: Anticipated Tomorrow             Calvin Davis MD  Hospitalist Service  [facility]  Securely message with [facility] (more info)  Text page via [employer] Paging/Directory   ______________________________________________________________________    Interval History     No fevers. + abdominal pain, less intense. + flatus.     Physical Exam   Vital Signs: Temp: 98.3  F (36.8  C) Temp src: Oral BP: (!) 142/93 Pulse: 63   Resp: 16 SpO2: 94 % O2 Device: None (Room air)    Weight: 171 lbs 11.2 oz    Gen - AAO x 3 in NAD.  Lungs - CTA B.  Heart - RR,S1+S2 nml, no m/g/r.  Abd - soft, + ttp LLQ, ND, + BS. No rebound.  Ext - no  edema.    Medical Decision Making       60 MINUTES SPENT BY ME on the date of service doing chart review, history, exam, documentation & further activities per the note.      Data     I have personally reviewed the following data over the past 24 hrs:    6.2  \   14.2   / 284     N/A N/A N/A /  N/A   3.9 N/A N/A \       Imaging results reviewed over the past 24 hrs:   No results found for this or any previous visit (from the past 24 hour(s)).

## 2024-05-17 LAB
MAGNESIUM SERPL-MCNC: 2.2 MG/DL (ref 1.7–2.3)
POTASSIUM SERPL-SCNC: 4.5 MMOL/L (ref 3.4–5.3)

## 2024-05-17 PROCEDURE — 250N000013 HC RX MED GY IP 250 OP 250 PS 637: Performed by: INTERNAL MEDICINE

## 2024-05-17 PROCEDURE — 250N000011 HC RX IP 250 OP 636: Performed by: INTERNAL MEDICINE

## 2024-05-17 PROCEDURE — 83735 ASSAY OF MAGNESIUM: CPT | Performed by: INTERNAL MEDICINE

## 2024-05-17 PROCEDURE — 99239 HOSP IP/OBS DSCHRG MGMT >30: CPT | Performed by: INTERNAL MEDICINE

## 2024-05-17 PROCEDURE — 36415 COLL VENOUS BLD VENIPUNCTURE: CPT | Performed by: INTERNAL MEDICINE

## 2024-05-17 PROCEDURE — 84132 ASSAY OF SERUM POTASSIUM: CPT | Performed by: INTERNAL MEDICINE

## 2024-05-17 PROCEDURE — 120N000001 HC R&B MED SURG/OB

## 2024-05-17 RX ORDER — OXYCODONE HYDROCHLORIDE 5 MG/1
2.5 TABLET ORAL EVERY 6 HOURS PRN
Qty: 12 TABLET | Refills: 0 | Status: SHIPPED | OUTPATIENT
Start: 2024-05-17 | End: 2024-05-23

## 2024-05-17 RX ADMIN — HYDROMORPHONE HYDROCHLORIDE 0.2 MG: 0.2 INJECTION, SOLUTION INTRAMUSCULAR; INTRAVENOUS; SUBCUTANEOUS at 14:46

## 2024-05-17 RX ADMIN — TAMSULOSIN HYDROCHLORIDE 0.4 MG: 0.4 CAPSULE ORAL at 22:04

## 2024-05-17 RX ADMIN — PIPERACILLIN AND TAZOBACTAM 3.38 G: 3; .375 INJECTION, POWDER, FOR SOLUTION INTRAVENOUS at 04:31

## 2024-05-17 RX ADMIN — PIPERACILLIN AND TAZOBACTAM 3.38 G: 3; .375 INJECTION, POWDER, FOR SOLUTION INTRAVENOUS at 16:25

## 2024-05-17 RX ADMIN — HYDROMORPHONE HYDROCHLORIDE 0.2 MG: 0.2 INJECTION, SOLUTION INTRAMUSCULAR; INTRAVENOUS; SUBCUTANEOUS at 22:01

## 2024-05-17 RX ADMIN — PIPERACILLIN AND TAZOBACTAM 3.38 G: 3; .375 INJECTION, POWDER, FOR SOLUTION INTRAVENOUS at 10:44

## 2024-05-17 RX ADMIN — PIPERACILLIN AND TAZOBACTAM 3.38 G: 3; .375 INJECTION, POWDER, FOR SOLUTION INTRAVENOUS at 22:04

## 2024-05-17 ASSESSMENT — ACTIVITIES OF DAILY LIVING (ADL)
ADLS_ACUITY_SCORE: 22

## 2024-05-17 NOTE — PLAN OF CARE
Goal Outcome Evaluation:      Plan of Care Reviewed With: patient    Overall Patient Progress: improvingOverall Patient Progress: improving    Outcome Evaluation: Pt had no complain of pain, declines pain intervention, reported pain more tolerable on thus shift.  To Do:  End of Shift Summary  For vital signs and complete assessments, please see documentation flowsheets.     Pertinent assessments: Assumed cares [id number]. A&O x4. VSS, RA. Up ad shree.C/o mild abdominal pain, refused pain intervention. Ind in the room. Pt reported pain is more tolerable on this shift. Tolerating full liquid diet. Colorectal following. PIV saline locked.    Major Shift Events: N/A    Treatment Plan: IV zosyn q6h, NS at 100 ml/hr, pain management.     Bedside Nurse: Elian Banegas RN   Problem: Oral Intake Inadequate  Goal: Improved Oral Intake  Outcome: Progressing     Problem: Pain Acute  Goal: Optimal Pain Control and Function  Outcome: Progressing  Intervention: Develop Pain Management Plan  Recent Flowsheet Documentation  Taken 5/16/2024 2032 by Elian Banegas RN  Pain Management Interventions: medication (see MAR)  Intervention: Prevent or Manage Pain  Recent Flowsheet Documentation  Taken 5/17/2024 0214 by Elian Banegas RN  Sleep/Rest Enhancement: awakenings minimized  Bowel Elimination Promotion: adequate fluid intake promoted  Medication Review/Management: medications reviewed  Intervention: Develop Pain Management Plan  Recent Flowsheet Documentation  Taken 5/16/2024 2032 by Elian Banegas RN  Pain Management Interventions: medication (see MAR)  Intervention: Prevent or Manage Pain  Recent Flowsheet Documentation  Taken 5/17/2024 0214 by Elian Banegas RN  Sleep/Rest Enhancement: awakenings minimized  Bowel Elimination Promotion: adequate fluid intake promoted  Medication Review/Management: medications reviewed     Problem: Infection  Goal: Absence of Infection Signs and Symptoms  Outcome: Progressing     Problem:  "Adult Inpatient Plan of Care  Goal: Plan of Care Review  Description: The Plan of Care Review/Shift note should be completed every shift.  The Outcome Evaluation is a brief statement about your assessment that the patient is improving, declining, or no change.  This information will be displayed automatically on your shift  note.  Outcome: Progressing  Flowsheets (Taken 5/17/2024 0628)  Outcome Evaluation: Pt had no complain of pain, declines pain intervention, reported pain more tolerable on thus shift.  Plan of Care Reviewed With: patient  Overall Patient Progress: improving  Goal: Patient-Specific Goal (Individualized)  Description: You can add care plan individualizations to a care plan. Examples of Individualization might be:  \"Parent requests to be called daily at 9am for status\", \"I have a hard time hearing out of my right ear\", or \"Do not touch me to wake me up as it startles  me\".  Outcome: Progressing  Goal: Absence of Hospital-Acquired Illness or Injury  Outcome: Progressing  Intervention: Identify and Manage Fall Risk  Recent Flowsheet Documentation  Taken 5/17/2024 0214 by Elian Banegas RN  Safety Promotion/Fall Prevention:   safety round/check completed   clutter free environment maintained  Intervention: Prevent Skin Injury  Recent Flowsheet Documentation  Taken 5/17/2024 0214 by Elian Banegas RN  Body Position: position changed independently  Taken 5/16/2024 2032 by Elian Banegas RN  Body Position: position changed independently  Intervention: Prevent and Manage VTE (Venous Thromboembolism) Risk  Recent Flowsheet Documentation  Taken 5/17/2024 0214 by Elian Banegas RN  VTE Prevention/Management: SCDs (sequential compression devices) off  Intervention: Prevent Infection  Recent Flowsheet Documentation  Taken 5/17/2024 0214 by Elian Banegas RN  Infection Prevention: hand hygiene promoted  Goal: Optimal Comfort and Wellbeing  Outcome: Progressing  Intervention: Monitor Pain and " Promote Comfort  Recent Flowsheet Documentation  Taken 5/16/2024 2032 by Elian Banegas, RN  Pain Management Interventions: medication (see MAR)  Goal: Readiness for Transition of Care  Outcome: Progressing

## 2024-05-17 NOTE — DISCHARGE SUMMARY
[facility]  Hospitalist Discharge Summary      Date of Admission:  5/11/2024  Date of Discharge:  5/17/2024  Discharging Provider: Calvin Davis MD  Discharge Service: Hospitalist Service    Discharge Diagnoses       Clinically Significant Risk Factors          Follow-ups Needed After Discharge   Follow-up Appointments     Follow-up and recommended labs and tests       Follow up with primary care provider, Physician No Ref-Primary, within 7   days for hospital follow- up.  The following labs/tests are recommended:   Please call or come if you have any new or worsening symptoms.  Follow up with CRS as outpatient as scheduled, likely will need   colonoscopy in 2-3 months.            Unresulted Labs Ordered in the Past 30 Days of this Admission       No orders found from 4/11/2024 to 5/12/2024.            Discharge Disposition   Discharged to home  Condition at discharge: Stable    Hospital Course   Summary of Stay: Pawel Vallejo is a 58 year old male who was admitted on 5/11/2024 for sigmoid diverticulitis.  Patient's past medical history significant for BPH, chronic back pain, GERD, constipation presented with right lower quadrant abdominal pain.  Valuation the emergency room showed sigmoid diverticulitis.    Acute sigmoid diverticulitis  Continues to have worsening abdominal pain  -FLD.  CT abdomen pelvis  5/14 stable with acute sigmoid diverticulitis with contained perforation.  -Continue as needed pain meds  -Continue IV  antibiotics  -Consult colorectal surgery    Painful hemorrhoids  -Consulted colorectal surgery      Imaging in ED showed/incidental findings  Atherosclerotic vascular disease  Emphysematous change and granulomatous change involving lungs  -Will defer to PCP on discharge further workup and restratification      Afebrile and not hypoxic. Abdominal pain is better. Tolerating PO low residue diet. Plan for home     Consultations This Hospital Stay   SURGERY GENERAL IP  CONSULT  COLORECTAL SURGERY IP CONSULT  [facility] IP CONSULT    Code Status   Full Code    Time Spent on this Encounter   I, Calvin Davis MD, personally saw the patient today and spent greater than 30 minutes discharging this patient.       Calvin Davis MD  [facility] MEDICAL SURGICAL  [address] [facility] [address]  Phone: [phone]  Fax: [phone]  ______________________________________________________________________    Physical Exam   Vital Signs: Temp: 98  F (36.7  C) Temp src: Oral BP: (!) 141/85 Pulse: 63   Resp: 16 SpO2: 93 % O2 Device: None (Room air)    Weight: 171 lbs 11.2 oz    Gen - AAO x 3 in NAD.  Lungs - CTA B.  Heart - RR,S1+S2 nml, no m/g/r.  Abd - soft, + ttp LLQ MINIMAL, ND, + BS. No rebound.  Ext - no edema.          Primary Care Physician   Physician No Ref-Primary    Discharge Orders      Reason for your hospital stay    Please refer to discharge summary. Briefly admitted for diverticulitis     Activity    Your activity upon discharge: activity as tolerated     Follow-up and recommended labs and tests     Follow up with primary care provider, Physician No Ref-Primary, within 7 days for hospital follow- up.  The following labs/tests are recommended:   Please call or come if you have any new or worsening symptoms.  Follow up with CRS as outpatient as scheduled, likely will need colonoscopy in 2-3 months.     Diet    Follow this diet upon discharge: Orders Placed This Encounter      Combination Diet Regular Diet Adult LOW fiber for 2 weeks. Avoid straining and constipation.       Significant Results and Procedures   Results for orders placed or performed during the hospital encounter of 05/11/24   Head CT w/o contrast    Narrative    EXAM: CT HEAD W/O CONTRAST  LOCATION: [facility]  DATE: 5/11/2024    INDICATION: headache, finger numbness  COMPARISON: None  TECHNIQUE: Routine CT Head without IV contrast.  Multiplanar reformats. Dose reduction techniques were used.    FINDINGS:  INTRACRANIAL CONTENTS: No intracranial hemorrhage, extraaxial collection, or mass effect.  No CT evidence of acute infarct. Normal parenchymal attenuation. Normal ventricles and sulci.     VISUALIZED ORBITS/SINUSES/MASTOIDS: No intraorbital abnormality. No paranasal sinus mucosal disease. No middle ear or mastoid effusion.    BONES/SOFT TISSUES: No acute abnormality.       Impression    IMPRESSION:  1.  No acute intracranial process.   CT Aortic Survey w Contrast    Narrative    EXAM: CT AORTIC SURVEY WITH CONTRAST  LOCATION: [facility]  DATE: 05/11/2024    INDICATION: Abdomen pain. ?Upper extremity symptoms.  COMPARISON: CT protocol performed 05/25/2021.  TECHNIQUE: CT angiogram chest abdomen pelvis during arterial phase of injection of IV contrast. 2D and 3D MIP reconstructions were performed by the CT technologist. Dose reduction techniques were used.   CONTRAST: 72 mL Isovue 370.    FINDINGS:   CT ANGIOGRAM CHEST, ABDOMEN, AND PELVIS: No mural hematoma. Mildly tortuous atherosclerotic aorta. No aortic dissection or aneurysm. Patent celiac, superior mesenteric, renal, and inferior mesenteric arteries. Patent iliac arteries. No central pulmonary   embolus.    LUNGS AND PLEURA: Mild centrilobular emphysematous change. Scattered calcified granulomata. No pneumothorax or pleural effusion.    MEDIASTINUM/AXILLAE: Normal size heart. No pericardial effusion. No hilar or mediastinal lymphadenopathy. Calcified mediastinal and hilar lymph nodes.    CORONARY ARTERY CALCIFICATION: None.    HEPATOBILIARY: Status post cholecystectomy.    PANCREAS: Normal.    SPLEEN: Normal.    ADRENAL GLANDS: Normal.    KIDNEYS/BLADDER: Normal.    BOWEL: Short segment mural thickening with an inflamed diverticulum of the sigmoid colon and adjacent inflammatory change, highly concerning for diverticulitis. No organized fluid collection. No free air.  Normal appendix. No obstruction.    LYMPH NODES: Normal.    PELVIC ORGANS: Normal.    MUSCULOSKELETAL: Mild multilevel discogenic degenerative change.      Impression    IMPRESSION:  1.  No aortic dissection, aneurysm, or pulmonary embolus.  2.  Sigmoid diverticulitis. No organized fluid collection.  3.  Atherosclerotic vascular disease.  4.  Mild emphysematous change.  5.  Ancient granulomatous disease/infection.     CT Abdomen Pelvis w Contrast    Narrative    CT ABDOMEN AND PELVIS WITH CONTRAST 5/14/2024 9:28 AM    CLINICAL HISTORY: Worsening abdominal pain. Recent history of  diverticulitis.    TECHNIQUE: CT scan of the abdomen and pelvis was performed following  injection of IV contrast. Multiplanar reformats were obtained. Dose  reduction techniques were used.  CONTRAST: 86 mL Isovue-370    COMPARISON: None.    FINDINGS:   LOWER CHEST: Scattered calcified granulomas at both lung bases. Mild  scarring or linear atelectasis at both lung bases posteriorly.    HEPATOBILIARY: No hepatic masses. Cholecystectomy.    PANCREAS: Normal.    SPLEEN: Normal.    ADRENAL GLANDS: Normal.    KIDNEYS/BLADDER: Mild malrotation of the left kidney. No  hydronephrosis.    BOWEL: No bowel obstruction. Colonic diverticulosis. Mild bowel wall  thickening and adjacent fat stranding in the mid sigmoid colon,  consistent with acute diverticulitis. A small adjacent extraluminal  air bubble suggests a contained perforation in this region. No  associated fluid collection to suggest abscess. No evidence for  colitis. Unremarkable appendix.    LYMPH NODES: No lymphadenopathy.    VASCULATURE: Mild atherosclerotic aortoiliac calcification.    PELVIC ORGANS: Mild prostatic enlargement.    ADDITIONAL FINDINGS: None.    MUSCULOSKELETAL: Unremarkable.      Impression    IMPRESSION: Acute sigmoid diverticulitis, with an adjacent  extraluminal air bubble suggesting contained perforation. No  associated fluid collection to suggest abscess.    TICO  MD JAM         SYSTEM ID:  [id number]       Discharge Medications   Current Discharge Medication List        START taking these medications    Details   amoxicillin-clavulanate (AUGMENTIN) 875-125 MG tablet Take 1 tablet by mouth 2 times daily  Qty: 16 tablet, Refills: 0    Associated Diagnoses: Diverticulitis      oxyCODONE (ROXICODONE) 5 MG tablet Take 0.5 tablets (2.5 mg) by mouth every 6 hours as needed for pain  Qty: 12 tablet, Refills: 0    Associated Diagnoses: Diverticulitis of colon           CONTINUE these medications which have CHANGED    Details   albuterol (PROAIR HFA/PROVENTIL HFA/VENTOLIN HFA) 108 (90 Base) MCG/ACT inhaler Inhale 2 puffs into the lungs every 4 hours as needed for shortness of breath or wheezing    Comments: Pharmacy may dispense brand covered by insurance (Proair, or proventil or ventolin or generic albuterol inhaler)  Associated Diagnoses: Mild intermittent asthma without complication           CONTINUE these medications which have NOT CHANGED    Details   Ascorbic Acid (VITAMIN C PO) Take 1 tablet by mouth daily      Cholecalciferol (VITAMIN D3 PO) Take 1 tablet by mouth daily      clobetasol (TEMOVATE) 0.05 % external ointment Apply topically nightly as needed To psoriasis on foot      KRILL OIL PO Take 1 capsule by mouth daily       tamsulosin (FLOMAX) 0.4 MG capsule Take 0.4 mg by mouth daily            Allergies   No Known Allergies

## 2024-05-17 NOTE — PROGRESS NOTES
COLON [facility] RECT[facility]GERY  PROGRESS NOTE    May 17, 2024    SUBJECTIVE:  Patient feeling better this morning. Pain well controlled and has not used any pain meds since 5pm last night. Had one BM yesterday. Passing gas. Tolerating fulls without n/v. AVSS. WBC normal.    OBJECTIVE:  Temp:  [97.7  F (36.5  C)-98  F (36.7  C)] 98  F (36.7  C)  Pulse:  [63-71] 63  Resp:  [16] 16  BP: (129-141)/(85-89) 141/85  SpO2:  [93 %-94 %] 93 %    Intake/Output Summary (Last 24 hours) at 5/17/2024 0846  Last data filed at 5/16/2024 1008  Gross per 24 hour   Intake 1887 ml   Output --   Net 1887 ml       GENERAL:  Awake, alert, no acute distress, lying in bed  HEAD: Nomocephalic atraumatic  SCLERA: anicteric  EXTREMITIES: warm and well perfused  ABDOMEN:  Soft, non- tender, non-distended, no rebound or guarding, no peritoneal signs    LABS:  Lab Results   Component Value Date    WBC 6.2 05/16/2024    WBC 15.4 05/29/2021     Lab Results   Component Value Date    HGB 14.2 05/16/2024    HGB 15.2 05/29/2021     Lab Results   Component Value Date    HCT 42.3 05/16/2024    HCT 45.0 05/29/2021     Lab Results   Component Value Date     05/16/2024     05/29/2021     Last Basic Metabolic Panel:  Lab Results   Component Value Date     05/15/2024     05/29/2021      Lab Results   Component Value Date    POTASSIUM 4.5 05/17/2024    POTASSIUM 3.7 05/29/2021     Lab Results   Component Value Date    CHLORIDE 105 05/15/2024    CHLORIDE 99 05/29/2021     Lab Results   Component Value Date    RASHI 8.9 05/15/2024    RASHI 8.3 05/29/2021     Lab Results   Component Value Date    CO2 27 05/15/2024    CO2 28 05/29/2021     Lab Results   Component Value Date    BUN 7.6 05/15/2024    BUN 25 05/29/2021     Lab Results   Component Value Date    CR 0.96 05/15/2024    CR 0.85 05/29/2021     Lab Results   Component Value Date    GLC 90 05/15/2024     05/29/2021       ASSESSMENT/PLAN: Gustavo is a 58 year old man admitted on 5/11 for  diverticulitis. Repeat CT scan on 5/15 with worsening diverticulitis with associated microperforation. No abscess.     - Low fiber diet  - Continue IV antibiotics. May transition to PO antibiotics if tolerates LFD.   - Pain management as needed, minimize narcotics  - Encourage ambulation  - No plans for surgery  - Okay for discharge from CRS perspective later today versus tomorrow once tolerating LFD and PO antibiotics. Will need outpatient follow up in our clinic which we will assist with scheduling.       For questions/paging, please contact the CRS office at [phone].    Delaney De La Cruz PA-C  [facility]  Phone: [phone]

## 2024-05-17 NOTE — PLAN OF CARE
Goal Outcome Evaluation:    End of Shift Summary  For vital signs and complete assessments, please see documentation flowsheets.     Pertinent assessments:  Pt A&OX4. Vss and on RA. LS clear and no sob noted. Tolerated full liuid and had some pain with low fiber and Dilaudid IV given x1 with relief. Indep in the room.    Major Shift Events: Advance diet.    Treatment Plan: IV Zosyn, pain management, possible discharge tomorrow.  Bedside Nurse: Prisca Ghosh RN         Plan of Care Reviewed With: patient    Overall Patient Progress: improvingOverall Patient Progress: improving    Outcome Evaluation: Pt alex ful liquid and had some pain with low fiber and dilaudd given x1.      Problem: Oral Intake Inadequate  Goal: Improved Oral Intake  Outcome: Progressing     Problem: Pain Acute  Goal: Optimal Pain Control and Function  Outcome: Progressing  Intervention: Develop Pain Management Plan  Recent Flowsheet Documentation  Taken 5/17/2024 0800 by Prisca Ghosh RN  Pain Management Interventions: declines  Intervention: Prevent or Manage Pain  Recent Flowsheet Documentation  Taken 5/17/2024 0800 by Prisca Ghosh RN  Medication Review/Management: medications reviewed  Intervention: Develop Pain Management Plan  Recent Flowsheet Documentation  Taken 5/17/2024 0800 by Prisca Ghosh RN  Pain Management Interventions: declines  Intervention: Prevent or Manage Pain  Recent Flowsheet Documentation  Taken 5/17/2024 0800 by Prisca Ghosh RN  Medication Review/Management: medications reviewed     Problem: Infection  Goal: Absence of Infection Signs and Symptoms  Outcome: Progressing     Problem: Adult Inpatient Plan of Care  Goal: Plan of Care Review  Description: The Plan of Care Review/Shift note should be completed every shift.  The Outcome Evaluation is a brief statement about your assessment that the patient is improving, declining, or no change.  This information will be displayed automatically on  "your shift  note.  Outcome: Progressing  Flowsheets (Taken 5/17/2024 1803)  Outcome Evaluation: Pt alex ful liquid and had some pain with low fiber and dilaudd given x1.  Plan of Care Reviewed With: patient  Overall Patient Progress: improving  Goal: Patient-Specific Goal (Individualized)  Description: You can add care plan individualizations to a care plan. Examples of Individualization might be:  \"Parent requests to be called daily at 9am for status\", \"I have a hard time hearing out of my right ear\", or \"Do not touch me to wake me up as it startles  me\".  Outcome: Progressing  Goal: Absence of Hospital-Acquired Illness or Injury  Outcome: Progressing  Intervention: Identify and Manage Fall Risk  Recent Flowsheet Documentation  Taken 5/17/2024 0800 by Prisca Ghosh RN  Safety Promotion/Fall Prevention:   clutter free environment maintained   lighting adjusted   nonskid shoes/slippers when out of bed   room near nurse's station  Intervention: Prevent Skin Injury  Recent Flowsheet Documentation  Taken 5/17/2024 0800 by Prisca Ghosh RN  Body Position: position changed independently  Intervention: Prevent and Manage VTE (Venous Thromboembolism) Risk  Recent Flowsheet Documentation  Taken 5/17/2024 0800 by Prisca Ghosh RN  VTE Prevention/Management: SCDs (sequential compression devices) off  Intervention: Prevent Infection  Recent Flowsheet Documentation  Taken 5/17/2024 0800 by Prisca Ghosh RN  Infection Prevention: hand hygiene promoted  Goal: Optimal Comfort and Wellbeing  Outcome: Progressing  Intervention: Monitor Pain and Promote Comfort  Recent Flowsheet Documentation  Taken 5/17/2024 0800 by Prisca Ghosh RN  Pain Management Interventions: declines  Goal: Readiness for Transition of Care  Outcome: Progressing     "

## 2024-05-17 NOTE — CONSULTS
"CLINICAL NUTRITION SERVICES  -  ASSESSMENT NOTE      Recommendations:   - Diet per MD teams.   - ***     MALNUTRITION:  % Weight Loss:  {:514952}  % Intake:  {:911720}  Subcutaneous Fat Loss:  {:961608} --> not using as indicator with only 1 region present ***  Muscle Loss:  {:995410}  Fluid Retention:  {:943644} --> not using as indicator given do not suspect masking true wt trends ***    Malnutrition Diagnosis: {:294800}  In Context of:  {In Context of:623651}          REASON FOR ASSESSMENT  Pawel Vallejo is a 58 year old male seen by Registered Dietitian for length of stay.      PMH of: Chronic back pain, GERD.    Admit 2/2: Abdominal pain, diverticulitis.      NUTRITION HISTORY  - Information obtained from ***  - Diet at home: ***  - Usual intakes: ***  - Barriers to PO intakes: ***  - Use of oral supplements: ***  - Chewing/swallowing issues: ***  - Meal preparation/grocery shopping: ***  - Allergies: NKFA.      CURRENT NUTRITION ORDERS  Diet Order:     Fulls    Current Intake/Tolerance:  Some degree of abdominal pain and nausea throughout admission.  Has been NPO or liquids during 6 day admit w/ clears 5/15 and fulls since yesterday AM.      ***      ANTHROPOMETRICS  Height: 5' 10\"  Weight: 171 lbs 11.2 oz  Body mass index is 24.64 kg/m .  Weight Status:  Normal BMI  Weight History:  Wt Readings from Last 10 Encounters:   05/11/24 77.9 kg (171 lb 11.2 oz)   11/02/21 77.1 kg (170 lb)   07/13/21 81.6 kg (180 lb)   05/29/21 79.7 kg (175 lb 12.8 oz)   05/16/21 77.5 kg (170 lb 14.4 oz)   02/09/21 77.6 kg (171 lb)   12/31/20 77.8 kg (171 lb 8.3 oz)   12/21/20 79.4 kg (175 lb)   02/03/20 77.1 kg (170 lb)   09/07/14 82.3 kg (181 lb 8 oz)     - ***    LABS: Reviewed  - No baseline phosphorus this admission ***    MEDICATIONS: Reviewed  - NaCl at 100 mL/hr    GI: Stooling patterns noted w/ recorded BM on 5/13.    SKIN: No current documentation of PI. ***      ASSESSED NUTRITION NEEDS PER APPROVED PRACTICE " GUIDELINES:    Dosing Weight 78 kg  Estimated Energy Needs: >/=1918 kcals ([place] St Jeor)  Justification: Activity factor of >/=1.2, consideration of acute illness and possible need for surgical intervention  Estimated Protein Needs: >/=94 grams protein (>/=1.2 g pro/Kg)  Justification: preservation of lean body mass, consideration of need for future surgical intervention  Estimated Fluid Needs: per MD      NUTRITION DIAGNOSIS:  Inadequate oral intake related to abdominal pain, nausea, and diet restriction with acute diverticulitis as evidenced by meeting <50% of nutrition needs x6 day admit.    NUTRITION INTERVENTIONS  Recommendations / Nutrition Prescription  See above.    Implementation  Nutrition education: {:460077}    Medical Food Supplement: As above. ***    Multivitamin/Mineral: As above. ***    Collaboration and Referral of Nutrition care: Discussed POC with team during rounds.  ***    {Interventions:259966}    Nutrition goals:  ***    Patient to consume at least ***-***% of meals ***or supplements*** ***.     MONITORING AND EVALUATION:  Progress towards goals will be monitored and evaluated per protocol and Practice Guidelines          Griselda Sweeney RDN, LD  Clinical Dietitian  3rd floor/ICU: [phone]  All other floors: [phone]  Weekend/holiday: [phone]  Office: [phone]
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
## 2024-05-18 VITALS
SYSTOLIC BLOOD PRESSURE: 136 MMHG | RESPIRATION RATE: 16 BRPM | HEIGHT: 70 IN | HEART RATE: 62 BPM | DIASTOLIC BLOOD PRESSURE: 85 MMHG | OXYGEN SATURATION: 94 % | TEMPERATURE: 97.6 F | BODY MASS INDEX: 24.58 KG/M2 | WEIGHT: 171.7 LBS

## 2024-05-18 LAB
MAGNESIUM SERPL-MCNC: 2.1 MG/DL (ref 1.7–2.3)
POTASSIUM SERPL-SCNC: 4.2 MMOL/L (ref 3.4–5.3)

## 2024-05-18 PROCEDURE — 84132 ASSAY OF SERUM POTASSIUM: CPT | Performed by: INTERNAL MEDICINE

## 2024-05-18 PROCEDURE — 250N000011 HC RX IP 250 OP 636: Performed by: INTERNAL MEDICINE

## 2024-05-18 PROCEDURE — 83735 ASSAY OF MAGNESIUM: CPT | Performed by: INTERNAL MEDICINE

## 2024-05-18 PROCEDURE — 36415 COLL VENOUS BLD VENIPUNCTURE: CPT | Performed by: INTERNAL MEDICINE

## 2024-05-18 RX ADMIN — PIPERACILLIN AND TAZOBACTAM 3.38 G: 3; .375 INJECTION, POWDER, FOR SOLUTION INTRAVENOUS at 04:16

## 2024-05-18 ASSESSMENT — ACTIVITIES OF DAILY LIVING (ADL)
ADLS_ACUITY_SCORE: 22

## 2024-05-18 NOTE — DISCHARGE SUMMARY
Discharge Note    Patient discharged to home via private vehicle  accompanied by significant other .  IV: Discontinued  Prescriptions filled and given to patient/family.   Belongings reviewed and sent with patient.   Home medications returned to patient: NA  Equipment sent with: N/A.   patient verbalizes understanding of discharge instructions. AVS given to patient.  Additional education completed?  Low fiber diet options given and instructed on when to call the MD.

## 2024-05-18 NOTE — PROGRESS NOTES
COLON [facility] RECTAL SURGERY  PROGRESS NOTE    May 18, 2024    SUBJECTIVE:  Doing well. Still has some discomfort but overall much improved. Passing gas and BM. Tolerating LFD.    OBJECTIVE:  Temp:  [97.6  F (36.4  C)-98.1  F (36.7  C)] 97.6  F (36.4  C)  Pulse:  [62-79] 62  Resp:  [16] 16  BP: (122-136)/(68-85) 136/85  SpO2:  [94 %-98 %] 94 %    Intake/Output Summary (Last 24 hours) at 5/17/2024 0846  Last data filed at 5/16/2024 1008  Gross per 24 hour   Intake 1887 ml   Output --   Net 1887 ml       GENERAL:  Awake, alert, no acute distress, lying in bed  HEAD: Nomocephalic atraumatic  SCLERA: anicteric  EXTREMITIES: warm and well perfused  ABDOMEN:  Soft, mild discomfort with palpation, non-distended, no rebound or guarding, no peritoneal signs    LABS:  Lab Results   Component Value Date    WBC 6.2 05/16/2024    WBC 15.4 05/29/2021     Lab Results   Component Value Date    HGB 14.2 05/16/2024    HGB 15.2 05/29/2021     Lab Results   Component Value Date    HCT 42.3 05/16/2024    HCT 45.0 05/29/2021     Lab Results   Component Value Date     05/16/2024     05/29/2021     Last Basic Metabolic Panel:  Lab Results   Component Value Date     05/15/2024     05/29/2021      Lab Results   Component Value Date    POTASSIUM 4.5 05/17/2024    POTASSIUM 3.7 05/29/2021     Lab Results   Component Value Date    CHLORIDE 105 05/15/2024    CHLORIDE 99 05/29/2021     Lab Results   Component Value Date    RASHI 8.9 05/15/2024    RASHI 8.3 05/29/2021     Lab Results   Component Value Date    CO2 27 05/15/2024    CO2 28 05/29/2021     Lab Results   Component Value Date    BUN 7.6 05/15/2024    BUN 25 05/29/2021     Lab Results   Component Value Date    CR 0.96 05/15/2024    CR 0.85 05/29/2021     Lab Results   Component Value Date    GLC 90 05/15/2024     05/29/2021       ASSESSMENT/PLAN: Gustavo is a 58 year old man admitted on 5/11 for diverticulitis. Repeat CT scan on 5/15 with worsening diverticulitis with  associated microperforation. No abscess. Patient has recovered well with conservative management.      - Continue low fiber diet  - Ok for discharge from CRS perspective with course of antibiotics. We will schedule patient for follow up in our clinic.       For questions/paging, please contact the CRS office at [phone].    Bia Serrano MD Fellow  [facility]

## 2024-05-18 NOTE — PLAN OF CARE
"To Do:  End of Shift Summary  For vital signs and complete assessments, please see documentation flowsheets.     Pertinent assessments: Pt A/O. VSS. Dilaudid given for abdo pain. Denies nausea and SOB. Tolerating low fiber diet. Up ind in room.   Major Shift Events None  Treatment Plan: Zosyn, symptom management and poss discharge today.  Bedside Nurse: Zayda Almendarez RN   Problem: Adult Inpatient Plan of Care  Goal: Plan of Care Review  Description: The Plan of Care Review/Shift note should be completed every shift.  The Outcome Evaluation is a brief statement about your assessment that the patient is improving, declining, or no change.  This information will be displayed automatically on your shift  note.  Recent Flowsheet Documentation  Taken 5/18/2024 0553 by Zayda Almendarez RN  Outcome Evaluation: tolerating low fiber  Plan of Care Reviewed With: patient  Overall Patient Progress: improving  Goal: Absence of Hospital-Acquired Illness or Injury  Intervention: Prevent Skin Injury  Recent Flowsheet Documentation  Taken 5/17/2024 2216 by Zayda Almendarez RN  Body Position: position changed independently     Problem: Adult Inpatient Plan of Care  Goal: Plan of Care Review  Description: The Plan of Care Review/Shift note should be completed every shift.  The Outcome Evaluation is a brief statement about your assessment that the patient is improving, declining, or no change.  This information will be displayed automatically on your shift  note.  Outcome: Progressing  Flowsheets (Taken 5/18/2024 0553)  Outcome Evaluation: tolerating low fiber  Plan of Care Reviewed With: patient  Overall Patient Progress: improving  Goal: Patient-Specific Goal (Individualized)  Description: You can add care plan individualizations to a care plan. Examples of Individualization might be:  \"Parent requests to be called daily at 9am for status\", \"I have a hard time hearing out of my right ear\", or \"Do not touch me to wake me up as it " "startles  me\".  Outcome: Progressing  Goal: Absence of Hospital-Acquired Illness or Injury  Outcome: Progressing  Intervention: Prevent Skin Injury  Recent Flowsheet Documentation  Taken 5/17/2024 2216 by Zayda Almendarez RN  Body Position: position changed independently  Goal: Optimal Comfort and Wellbeing  Outcome: Progressing  Goal: Readiness for Transition of Care  Outcome: Progressing   Goal Outcome Evaluation:      Plan of Care Reviewed With: patient    Overall Patient Progress: improvingOverall Patient Progress: improving    Outcome Evaluation: tolerating low fiber      "

## 2024-07-05 ENCOUNTER — HOSPITAL ENCOUNTER (OUTPATIENT)
Dept: CT IMAGING | Facility: CLINIC | Age: 59
Discharge: HOME | End: 2024-07-05
Payer: COMMERCIAL

## 2024-07-05 DIAGNOSIS — K57.92 DIVERTICULITIS: ICD-10-CM

## 2024-07-05 DIAGNOSIS — R10.9 ABDOMINAL PAIN: ICD-10-CM

## 2024-07-05 PROCEDURE — 250N000009 HC RX 250

## 2024-07-05 PROCEDURE — 74177 CT ABD & PELVIS W/CONTRAST: CPT

## 2024-07-05 PROCEDURE — 250N000011 HC RX IP 250 OP 636

## 2024-07-05 RX ORDER — IOPAMIDOL 755 MG/ML
500 INJECTION, SOLUTION INTRAVASCULAR ONCE
Status: COMPLETED | OUTPATIENT
Start: 2024-07-05 | End: 2024-07-05

## 2024-07-05 RX ADMIN — SODIUM CHLORIDE 52 ML: 9 INJECTION, SOLUTION INTRAVENOUS at 13:35

## 2024-07-05 RX ADMIN — IOPAMIDOL 86 ML: 755 INJECTION, SOLUTION INTRAVENOUS at 13:35

## 2024-07-06 ENCOUNTER — HEALTH MAINTENANCE LETTER (OUTPATIENT)
Age: 59
End: 2024-07-06

## 2024-10-14 DIAGNOSIS — R10.31 RLQ ABDOMINAL PAIN: Primary | ICD-10-CM

## 2024-10-14 DIAGNOSIS — Z87.19 HISTORY OF DIVERTICULITIS: ICD-10-CM

## 2024-10-15 ENCOUNTER — HOSPITAL ENCOUNTER (OUTPATIENT)
Dept: CT IMAGING | Facility: CLINIC | Age: 59
Discharge: HOME | End: 2024-10-15
Payer: COMMERCIAL

## 2024-10-15 DIAGNOSIS — Z87.19 HISTORY OF DIVERTICULITIS: ICD-10-CM

## 2024-10-15 DIAGNOSIS — R10.31 RLQ ABDOMINAL PAIN: ICD-10-CM

## 2024-10-15 PROCEDURE — 74177 CT ABD & PELVIS W/CONTRAST: CPT

## 2024-10-15 PROCEDURE — 250N000011 HC RX IP 250 OP 636

## 2024-10-15 PROCEDURE — 250N000009 HC RX 250

## 2024-10-15 RX ORDER — IOPAMIDOL 755 MG/ML
500 INJECTION, SOLUTION INTRAVASCULAR ONCE
Status: COMPLETED | OUTPATIENT
Start: 2024-10-15 | End: 2024-10-15

## 2024-10-15 RX ADMIN — SODIUM CHLORIDE 52 ML: 9 INJECTION, SOLUTION INTRAVENOUS at 13:16

## 2024-10-15 RX ADMIN — IOPAMIDOL 86 ML: 755 INJECTION, SOLUTION INTRAVENOUS at 13:16

## 2024-12-05 NOTE — PLAN OF CARE
"Blood pressure 128/68, pulse 85, temperature 96.5  F (35.8  C), temperature source Oral, resp. rate 18, height 1.778 m (5' 10\"), weight 77.5 kg (170 lb 14.4 oz), SpO2 93 %.      02 increased to 4 liters nasal cannula for persistent sats  88-89% from 3 liters.  Patient is SOB with minimal exertion.  Patient has a frequent cough, non productive.  Patient sats now 92-93% on 4 liters.    " Yes

## 2025-07-13 ENCOUNTER — HEALTH MAINTENANCE LETTER (OUTPATIENT)
Age: 60
End: 2025-07-13

## (undated) DEVICE — DRAPE STERI TOWEL LG 1010

## (undated) DEVICE — SUCTION MINISQUAIR SMOKE EVAC CAPTURE DEVICE SQ20012-01

## (undated) DEVICE — GLOVE PROTEXIS BLUE W/NEU-THERA 8.0  2D73EB80

## (undated) DEVICE — DRAPE IOBAN INCISE 23X17" 6650EZ

## (undated) DEVICE — BONE WAX 2.5GM W31G

## (undated) DEVICE — SPONGE KITTNER 31001010

## (undated) DEVICE — LINEN GOWN X4 5410

## (undated) DEVICE — SU MONOCRYL 3-0 PS-2 18" UND Y497G

## (undated) DEVICE — SU SILK 0 TIE 6X30" A306H

## (undated) DEVICE — DRAPE C-ARM W/STRAPS 42X72" 07-CA104

## (undated) DEVICE — DRSG TEGADERM 4X10" 1627

## (undated) DEVICE — BASIN SET MAJOR

## (undated) DEVICE — PEN MARKING SKIN W/PAPER RULER 31145785

## (undated) DEVICE — TOOL DISSECT MIDAS MR8 14CM MATCH HEAD 3MM MR8-14MH30

## (undated) DEVICE — SYR 50ML LL W/O NDL 309653

## (undated) DEVICE — PREP CHLORAPREP 26ML TINTED HI-LITE ORANGE 930815

## (undated) DEVICE — SU VICRYL 1 CT-1 CR 8X18" J741D

## (undated) DEVICE — SPECIMEN CONTAINER 5OZ STERILE 2600SA

## (undated) DEVICE — DRAPE C-ARMOR 5 SIDED 5523

## (undated) DEVICE — ESU CORD BIPOLAR GREEN 10-4000

## (undated) DEVICE — SU ETHILON 3-0 PS-1 18" 1663H

## (undated) DEVICE — GOWN IMPERVIOUS SPECIALTY XLG/XLONG 32474

## (undated) DEVICE — IOM SUPPLIES/CASE FEE

## (undated) DEVICE — ESU GROUND PAD UNIVERSAL W/O CORD

## (undated) DEVICE — GLOVE PROTEXIS W/NEU-THERA 7.5  2D73TE75

## (undated) DEVICE — ADH SKIN CLOSURE PREMIERPRO EXOFIN 1.0ML 3470

## (undated) DEVICE — SU VICRYL 2-0 CT-2 8X18" UND D8144

## (undated) DEVICE — POSITIONER HEAD DONUT FOAM 9" LF FP-HEAD9

## (undated) DEVICE — DRAPE POUCH INSTRUMENT 1018

## (undated) DEVICE — SOL NACL 0.9% IRRIG 1000ML BOTTLE 2F7124

## (undated) DEVICE — BRUSH SURGICAL SCRUB W/4% CHLORHEXIDINE GLUCONATE SOL 4458A

## (undated) DEVICE — SOL ISOPROPYL RUBBING ALCOHOL USP 70% 4OZ HDX-20 I0020

## (undated) DEVICE — SPONGE COTTONOID NEURO 1/2"X1/2" 30-054

## (undated) DEVICE — POSITIONER ARMBOARD FOAM 1PAIR LF FP-ARMB1

## (undated) DEVICE — DRSG KERLIX FLUFFS X5

## (undated) DEVICE — ESU ELEC BLADE 2.75" COATED/INSULATED E1455

## (undated) DEVICE — LINEN TOWEL PACK X5 5464

## (undated) DEVICE — PACK SET-UP STD 9102

## (undated) DEVICE — TAPE DURAPORE 3" SILK 1538-3

## (undated) DEVICE — LINEN BACK PACK 5440

## (undated) DEVICE — DRAPE LAP W/ARMBOARD 29410

## (undated) DEVICE — SOL WATER IRRIG 1000ML BOTTLE 2F7114

## (undated) DEVICE — DRSG TEGADERM 4X4 3/4" 1626W

## (undated) DEVICE — SUCTION MANIFOLD DORNOCH ULTRA CART UL-CL500

## (undated) DEVICE — Device

## (undated) RX ORDER — FLUMAZENIL 0.1 MG/ML
INJECTION, SOLUTION INTRAVENOUS
Status: DISPENSED
Start: 2021-11-08

## (undated) RX ORDER — VANCOMYCIN HYDROCHLORIDE 500 MG/10ML
INJECTION, POWDER, LYOPHILIZED, FOR SOLUTION INTRAVENOUS
Status: DISPENSED
Start: 2020-12-31

## (undated) RX ORDER — FENTANYL CITRATE 50 UG/ML
INJECTION, SOLUTION INTRAMUSCULAR; INTRAVENOUS
Status: DISPENSED
Start: 2020-12-31

## (undated) RX ORDER — HYDROMORPHONE HYDROCHLORIDE 1 MG/ML
INJECTION, SOLUTION INTRAMUSCULAR; INTRAVENOUS; SUBCUTANEOUS
Status: DISPENSED
Start: 2020-12-31

## (undated) RX ORDER — CEFAZOLIN SODIUM 2 G/100ML
INJECTION, SOLUTION INTRAVENOUS
Status: DISPENSED
Start: 2020-12-31

## (undated) RX ORDER — ACETAMINOPHEN 325 MG/1
TABLET ORAL
Status: DISPENSED
Start: 2020-12-31

## (undated) RX ORDER — GABAPENTIN 300 MG/1
CAPSULE ORAL
Status: DISPENSED
Start: 2020-12-31

## (undated) RX ORDER — ONDANSETRON 2 MG/ML
INJECTION INTRAMUSCULAR; INTRAVENOUS
Status: DISPENSED
Start: 2020-12-31

## (undated) RX ORDER — PROPOFOL 10 MG/ML
INJECTION, EMULSION INTRAVENOUS
Status: DISPENSED
Start: 2020-12-31

## (undated) RX ORDER — DEXAMETHASONE SODIUM PHOSPHATE 4 MG/ML
INJECTION, SOLUTION INTRA-ARTICULAR; INTRALESIONAL; INTRAMUSCULAR; INTRAVENOUS; SOFT TISSUE
Status: DISPENSED
Start: 2020-12-31

## (undated) RX ORDER — DEXAMETHASONE SODIUM PHOSPHATE 10 MG/ML
INJECTION, SOLUTION INTRAMUSCULAR; INTRAVENOUS
Status: DISPENSED
Start: 2021-11-08

## (undated) RX ORDER — CEFAZOLIN SODIUM 1 G/3ML
INJECTION, POWDER, FOR SOLUTION INTRAMUSCULAR; INTRAVENOUS
Status: DISPENSED
Start: 2020-12-31

## (undated) RX ORDER — FENTANYL CITRATE 50 UG/ML
INJECTION, SOLUTION INTRAMUSCULAR; INTRAVENOUS
Status: DISPENSED
Start: 2021-11-08

## (undated) RX ORDER — LIDOCAINE HYDROCHLORIDE 10 MG/ML
INJECTION, SOLUTION EPIDURAL; INFILTRATION; INTRACAUDAL; PERINEURAL
Status: DISPENSED
Start: 2021-11-08

## (undated) RX ORDER — FENTANYL CITRATE-0.9 % NACL/PF 10 MCG/ML
PLASTIC BAG, INJECTION (ML) INTRAVENOUS
Status: DISPENSED
Start: 2020-12-31

## (undated) RX ORDER — NALOXONE HYDROCHLORIDE 0.4 MG/ML
INJECTION, SOLUTION INTRAMUSCULAR; INTRAVENOUS; SUBCUTANEOUS
Status: DISPENSED
Start: 2021-11-08

## (undated) RX ORDER — OXYCODONE HYDROCHLORIDE 5 MG/1
TABLET ORAL
Status: DISPENSED
Start: 2020-12-31